# Patient Record
Sex: FEMALE | Race: WHITE | NOT HISPANIC OR LATINO | Employment: FULL TIME | URBAN - METROPOLITAN AREA
[De-identification: names, ages, dates, MRNs, and addresses within clinical notes are randomized per-mention and may not be internally consistent; named-entity substitution may affect disease eponyms.]

---

## 2017-04-12 ENCOUNTER — LAB VISIT (OUTPATIENT)
Dept: LAB | Facility: HOSPITAL | Age: 32
End: 2017-04-12
Attending: FAMILY MEDICINE
Payer: MEDICAID

## 2017-04-12 ENCOUNTER — OFFICE VISIT (OUTPATIENT)
Dept: FAMILY MEDICINE | Facility: HOSPITAL | Age: 32
End: 2017-04-12
Attending: FAMILY MEDICINE
Payer: MEDICAID

## 2017-04-12 VITALS
HEIGHT: 67 IN | SYSTOLIC BLOOD PRESSURE: 109 MMHG | HEART RATE: 78 BPM | DIASTOLIC BLOOD PRESSURE: 64 MMHG | BODY MASS INDEX: 19.07 KG/M2 | WEIGHT: 121.5 LBS

## 2017-04-12 DIAGNOSIS — R60.0 BILATERAL EDEMA OF LOWER EXTREMITY: Primary | ICD-10-CM

## 2017-04-12 DIAGNOSIS — R60.0 BILATERAL EDEMA OF LOWER EXTREMITY: ICD-10-CM

## 2017-04-12 DIAGNOSIS — M54.50 CHRONIC MIDLINE LOW BACK PAIN WITHOUT SCIATICA: ICD-10-CM

## 2017-04-12 DIAGNOSIS — N91.2 AMENORRHEA: ICD-10-CM

## 2017-04-12 DIAGNOSIS — G89.29 CHRONIC MIDLINE LOW BACK PAIN WITHOUT SCIATICA: ICD-10-CM

## 2017-04-12 LAB
ALBUMIN SERPL BCP-MCNC: 3.6 G/DL
ALP SERPL-CCNC: 77 U/L
ALT SERPL W/O P-5'-P-CCNC: 16 U/L
ANION GAP SERPL CALC-SCNC: 6 MMOL/L
AST SERPL-CCNC: 20 U/L
BASOPHILS # BLD AUTO: 0.05 K/UL
BASOPHILS NFR BLD: 0.7 %
BILIRUB SERPL-MCNC: 0.4 MG/DL
BUN SERPL-MCNC: 8 MG/DL
CALCIUM SERPL-MCNC: 9 MG/DL
CHLORIDE SERPL-SCNC: 102 MMOL/L
CO2 SERPL-SCNC: 34 MMOL/L
CREAT SERPL-MCNC: 0.7 MG/DL
DIFFERENTIAL METHOD: NORMAL
EOSINOPHIL # BLD AUTO: 0.3 K/UL
EOSINOPHIL NFR BLD: 4.5 %
ERYTHROCYTE [DISTWIDTH] IN BLOOD BY AUTOMATED COUNT: 12.7 %
EST. GFR  (AFRICAN AMERICAN): >60 ML/MIN/1.73 M^2
EST. GFR  (NON AFRICAN AMERICAN): >60 ML/MIN/1.73 M^2
GLUCOSE SERPL-MCNC: 84 MG/DL
HCG INTACT+B SERPL-ACNC: <1.2 MIU/ML
HCT VFR BLD AUTO: 38.1 %
HGB BLD-MCNC: 12.8 G/DL
LYMPHOCYTES # BLD AUTO: 2.5 K/UL
LYMPHOCYTES NFR BLD: 36.9 %
MCH RBC QN AUTO: 30.9 PG
MCHC RBC AUTO-ENTMCNC: 33.6 %
MCV RBC AUTO: 92 FL
MONOCYTES # BLD AUTO: 0.8 K/UL
MONOCYTES NFR BLD: 11.1 %
NEUTROPHILS # BLD AUTO: 3.2 K/UL
NEUTROPHILS NFR BLD: 46.7 %
PLATELET # BLD AUTO: 206 K/UL
PMV BLD AUTO: 10.1 FL
POTASSIUM SERPL-SCNC: 4 MMOL/L
PROT SERPL-MCNC: 6.2 G/DL
RBC # BLD AUTO: 4.14 M/UL
SODIUM SERPL-SCNC: 142 MMOL/L
T4 FREE SERPL-MCNC: 0.95 NG/DL
TSH SERPL DL<=0.005 MIU/L-ACNC: 1.35 UIU/ML
WBC # BLD AUTO: 6.85 K/UL

## 2017-04-12 PROCEDURE — 84702 CHORIONIC GONADOTROPIN TEST: CPT

## 2017-04-12 PROCEDURE — 84439 ASSAY OF FREE THYROXINE: CPT

## 2017-04-12 PROCEDURE — 85025 COMPLETE CBC W/AUTO DIFF WBC: CPT

## 2017-04-12 PROCEDURE — 80053 COMPREHEN METABOLIC PANEL: CPT

## 2017-04-12 PROCEDURE — 99213 OFFICE O/P EST LOW 20 MIN: CPT | Performed by: FAMILY MEDICINE

## 2017-04-12 PROCEDURE — 36415 COLL VENOUS BLD VENIPUNCTURE: CPT

## 2017-04-12 PROCEDURE — 84443 ASSAY THYROID STIM HORMONE: CPT

## 2017-04-12 RX ORDER — ACETAMINOPHEN 500 MG
500 TABLET ORAL EVERY 6 HOURS PRN
Refills: 0 | COMMUNITY
Start: 2017-04-12 | End: 2020-08-10

## 2017-04-12 NOTE — MR AVS SNAPSHOT
[facility]  [address], [address]  [place] [address]  Phone: [phone]  Fax: [phone]                  Agnes Alonso   2017 3:00 PM   Office Visit    Description:  Female : 1985   Provider:  Carole Dee MD   Department:  [facility]           Reason for Visit     Foot Swelling           Diagnoses this Visit        Comments    Bilateral edema of lower extremity    -  Primary     Chronic midline low back pain without sciatica                To Do List           Future Appointments        Provider Department Dept Phone    2017 4:40 PM APPOINTMENT LAB ABIMAELRODRIGO ZAMAN [facility] [phone]    2017 4:50 PM APPOINTMENT LAB ABIMAELRODRIGO ZAMAN [facility] [phone]    2017 4:20 PM Harvey Read MD [facility] [phone]      Goals (5 Years of Data)     None      [facility]     [facility] Nurse Care Line -  Assistance  Unless otherwise directed by your provider, please contact [facility], our nurse care line that is available for  assistance.     Registered nurses in the [facility] provide: appointment scheduling, clinical advisement, health education, and other advisory services.  Call: [phone] (toll free)               Medications           Message regarding Medications     Verify the changes and/or additions to your medication regime listed below are the same as discussed with your clinician today.  If any of these changes or additions are incorrect, please notify your healthcare provider.        STOP taking these medications     ibuprofen (ADVIL,MOTRIN) 800 MG tablet Take 1 tablet (800 mg total) by mouth daily as needed for Pain.           Verify that the below list of medications is an accurate representation of the medications you are currently taking.  If none reported, the list may be blank. If incorrect, please contact  "your healthcare provider. Carry this list with you in case of emergency.           Current Medications            Clinical Reference Information           Your Vitals Were     BP Pulse Height Weight Last Period BMI    109/64 78 5' 7" (1.702 m) 55.1 kg (121 lb 7.6 oz) 01/12/2017 19.03 kg/m2      Blood Pressure          Most Recent Value    BP  109/64      Allergies as of 4/12/2017     Penicillins      Immunizations Administered on Date of Encounter - 4/12/2017     None      Orders Placed During Today's Visit     Future Labs/Procedures Expected by Expires    CBC auto differential  4/12/2017 6/11/2018    Comprehensive metabolic panel  4/12/2017 6/11/2018    HCG, QUANTITATIVE, PREGNANCY  4/12/2017 6/11/2018    Microalbumin/creatinine urine ratio  4/12/2017 6/11/2018    T4, free  4/12/2017 6/11/2018    TSH  4/12/2017 6/11/2018    Urinalysis  4/12/2017 6/11/2018      Smoking Cessation     If you would like to quit smoking:   You may be eligible for free services if you are a [place] resident and started smoking cigarettes before September 1, 1988.  Call the Smoking Cessation Trust ([facility]) toll free at (132) 417-2076 or (560) 726-8120.   Call -800-QUIT-NOW if you do not meet the above criteria.   Contact us via email: [email or web address]   View our website for more information: [email or web address]        Language Assistance Services     ATTENTION: Language assistance services are available, free of charge. Please call [phone].      ATENCIÓN: Si habla español, tiene a silva disposición servicios gratuitos de asistencia lingüística. Llame al [phone].     CHÚ Ý: N?u b?n nói Ti?ng Vi?t, có các d?ch v? h? tr? ngôn ng? mi?n phí dành cho b?n. G?i s? [phone].         [facility] complies with applicable Federal civil rights laws and does not discriminate on the basis of race, color, national origin, age, disability, or sex.        "

## 2017-04-12 NOTE — PROGRESS NOTES
"Subjective:       Patient ID: Agnes Alonso is a 31 y.o. female.    Chief Complaint: Foot Swelling (also hands)    Edema   This is a new problem. Episode onset: 1-2 weeks bilateral LE, ~1 week hand swelling. The problem occurs constantly. The problem has been waxing and waning. Associated symptoms include fatigue, headaches (chronic) and weakness (fatigue-like). Pertinent negatives include no abdominal pain, anorexia, change in bowel habit, chest pain, chills, congestion, coughing, diaphoresis, fever, joint swelling, myalgias, nausea, neck pain, rash, sore throat, urinary symptoms, vertigo, visual change or vomiting. The symptoms are aggravated by standing. She has tried rest (Elevating feet, various "alternative" therapies consisting mostly of water and lemon juice.) for the symptoms. The treatment provided no relief.     Review of Systems   Constitutional: Positive for fatigue. Negative for appetite change, chills, diaphoresis and fever. Unexpected weight change: No ascencion weight change noticed, but increase of 5 lbs when checked today.   HENT: Negative for congestion, facial swelling, postnasal drip, rhinorrhea, sore throat and trouble swallowing.    Eyes: Negative for pain and visual disturbance.   Respiratory: Negative for cough, chest tightness and shortness of breath.    Cardiovascular: Positive for leg swelling (equal bilaterally from hip onward). Negative for chest pain and palpitations.   Gastrointestinal: Negative for abdominal distention, abdominal pain, anorexia, blood in stool, change in bowel habit, constipation, diarrhea, nausea and vomiting.   Endocrine: Negative for cold intolerance, heat intolerance, polydipsia, polyphagia and polyuria.   Genitourinary: Positive for menstrual problem (amenorrhea x4 months). Negative for decreased urine volume, dysuria, flank pain, frequency, genital sores, hematuria, pelvic pain, vaginal bleeding, vaginal discharge and vaginal pain.   Musculoskeletal: " Positive for back pain (chronic lower back pain, no significant recent change). Negative for joint swelling, myalgias and neck pain.   Skin: Negative for pallor and rash.   Allergic/Immunologic: Negative for environmental allergies, food allergies and immunocompromised state.   Neurological: Positive for weakness (fatigue-like) and headaches (chronic). Negative for dizziness, vertigo, syncope and light-headedness.       Objective:      Vitals:    04/12/17 1515   BP: 109/64   Pulse: 78     Physical Exam   Constitutional: She is oriented to person, place, and time. Vital signs are normal. She appears well-developed. She is cooperative. She does not appear ill. No distress.   HENT:   Head: Normocephalic and atraumatic.   Nose: Nose normal.   Mouth/Throat: Oropharynx is clear and moist.   Eyes: Conjunctivae and EOM are normal. Pupils are equal, round, and reactive to light. No scleral icterus.   Neck: Normal range of motion. Neck supple. No JVD present. No thyromegaly present.   Cardiovascular: Normal rate, regular rhythm, normal heart sounds and intact distal pulses.  Exam reveals no gallop and no friction rub.    No murmur heard.  Pulmonary/Chest: Effort normal and breath sounds normal. No respiratory distress. She has no wheezes. She has no rales.   Abdominal: Soft. Bowel sounds are normal. She exhibits no distension. There is no tenderness.   Musculoskeletal: Normal range of motion. She exhibits edema. She exhibits no tenderness or deformity.   Bilateral pretibial non-pitting +1 edema. Non-wheeping, non-erythematous, non-tender.   Lymphadenopathy:     She has no cervical adenopathy.   Neurological: She is alert and oriented to person, place, and time. No cranial nerve deficit. Coordination normal.   Skin: Skin is dry. No rash noted. She is not diaphoretic. No erythema.   Psychiatric: She has a normal mood and affect. Her behavior is normal. Judgment and thought content normal.   Normal apparent level of energy.    Vitals reviewed.      Assessment:     Bilateral edema of lower extremity:  - BLE are noticeably edematous, but there is no inflammation/infection nor pitting or shiny appearance. Given her chronic NSAID, her edema is most likely due to renal damage 2/2 NSAID use. Possibly taking more equivalent naproxen than her previous ibuprofen (naproxen 500mg 1-2x per day vs ibuprofen 800mg 2x per day). She denies decreased oral fluid intake, though this generally difficult to  from memory. She also denies any toxin ingestion including excessive ETOH. No family history of renal dz. Too young to have significant suspicion for CHF and her hx/physical was neg for anything consistent with it. All of this said, we cannot say with any certainty what the cause is without labs, thus she have have drawn today the labs listed below in the plan.  - Instructed pt to stop all NSAIDs and use Tylenol 500mg q6h PRN for now    Chronic midline low back pain without sciatica:   - Pain is stable and unchanged; no new complaints or progression of symptoms.   - Instructed to stop NSAIDs and start Tylenol as stated above. Pt is agreeable to trying this.    Amenorrhea:  - Has not mentrated or had any spotting for 4 months. Has a two year old child; uncomplicated pregnancy and birth. BMI is 19, technically anorexic and may explain her amenorrhea. Wt is 121lbs today, which is actually higher than her usual 110-115lbs which she has stayed between for years. Denies any sexual partners for longer than this time period, however a serum pregnancy test will be done with her workup at a precaution. If negative, we will perform a full pelvic exam and decide the next step from there.    Plan:       Bilateral edema of lower extremity  -     CBC auto differential; Future; Expected date: 4/12/17  -     Comprehensive metabolic panel; Future; Expected date: 4/12/17  -     TSH; Future; Expected date: 4/12/17  -     T4, free; Future; Expected date: 4/12/17  -      Urinalysis; Future; Expected date: 4/12/17  -     Microalbumin/creatinine urine ratio; Future; Expected date: 4/12/17  -     HCG, QUANTITATIVE, PREGNANCY; Future; Expected date: 4/12/17    Chronic midline low back pain without sciatica  -     acetaminophen (TYLENOL) 500 MG tablet; Take 1 tablet (500 mg total) by mouth every 6 (six) hours as needed for Pain.; Refill: 0    Amenorrhea:        -     Serum quantitative pregnancy test    RTC in 1 week or sooner if labs return with problems requiring immediate attention.

## 2017-04-13 NOTE — PATIENT INSTRUCTIONS
Amenorrhea     Normally, the uterine lining builds up and is shed each month during a womans period. With amenorrhea, this process does not happen.   Menstruation occurs when a woman sheds the lining of her uterus (womb). It is also called a period. For most women, this happens once a month. But some women may not get their periods. This is called amenorrhea.  Amenorrhea may be due to a number of causes. These include:  · Pregnancy, breastfeeding, or menopause  · Hormone problems  · Emotional stress  · Being at too low body weight  · Exercising too much  · Poor nutrition or eating disorders  · Taking certain medicines  · Having certain birth defects or genetic disorders  There are 2 types of amenorrhea:  · Primary amenorrhea is when a girl has not had her first period by age 15.  · Secondary amenorrhea is when:  ¨ A girl or woman who has been having normal periods stops getting them for 3 months in a row  ¨ A girl or woman who has been having irregular periods stops getting them for 6 months in a row  One or more tests can be done to find out why youre not having periods. These include blood tests and imaging tests. Once the cause is found, it can be treated. Treatments can range from lifestyle and diet changes to medicines, procedures, or surgery. Your healthcare provider will discuss options with you as needed.  Follow-up care  Follow up with your healthcare provider as advised. You'll be told the results of any tests as soon as they are ready.   Note: Know that you can still become pregnant even if you are not having regular periods. It is important to use some form of birth control if you are sexually active and do not wish to become pregnant.   When to seek medical advice  Call your healthcare provider right way if any of these occur:  · Severe pain in the abdomen (belly)  · Swelling of the belly  · Sudden, severe vaginal bleeding  · Feeling faint or passing out  Date Last Reviewed: 6/11/2015  © 2941-4933  [email or web address]. [address] [place] [address], [place] [address]. All rights reserved. This information is not intended as a substitute for professional medical care. Always follow your healthcare professional's instructions.

## 2017-04-13 NOTE — PROGRESS NOTES
I assume primary medical responsibility for this patient, I have reviewed the case history, findings, diagnosis and treatment plan with the resident and agree that the care is reasonable and necessary. This service has been performed by a resident without the presence of a teaching physician under the primary care exception  Carole Dee  4/13/2017

## 2017-04-19 ENCOUNTER — OFFICE VISIT (OUTPATIENT)
Dept: FAMILY MEDICINE | Facility: HOSPITAL | Age: 32
End: 2017-04-19
Attending: FAMILY MEDICINE
Payer: MEDICAID

## 2017-04-19 VITALS
HEIGHT: 67 IN | SYSTOLIC BLOOD PRESSURE: 126 MMHG | TEMPERATURE: 98 F | HEART RATE: 110 BPM | BODY MASS INDEX: 18.72 KG/M2 | DIASTOLIC BLOOD PRESSURE: 72 MMHG | WEIGHT: 119.25 LBS

## 2017-04-19 DIAGNOSIS — J06.9 VIRAL URI WITH COUGH: ICD-10-CM

## 2017-04-19 DIAGNOSIS — R60.0 BILATERAL EDEMA OF LOWER EXTREMITY: Primary | ICD-10-CM

## 2017-04-19 PROCEDURE — 99213 OFFICE O/P EST LOW 20 MIN: CPT | Performed by: FAMILY MEDICINE

## 2017-04-19 NOTE — PROGRESS NOTES
Subjective:       Patient ID: Agnes Alonso is a 31 y.o. female.    Chief Complaint: Results    HPI   Ms. Alonso is a 32 yo female with PMHx of Depression and Anxiety presenting for lab results. Patient states that she was seen on 4/12/17 due to complains of bilateral ankle swelling. She states that her job requires her to stand most of the time but states that the swelling started 3 weeks ago. Patient denies any trauma, injury or discoloration of her lower extremities. Patient was previously taking NSAIDs for pain but has not taken them for the past week due to risk of kidney disease. She denies any F/C, HA, SOB, CP or pain of lower extremities.    Review of Systems   Constitutional: Negative for chills and fever.   HENT: Positive for congestion, rhinorrhea and sore throat.    Eyes: Negative for visual disturbance.   Respiratory: Negative for cough, shortness of breath and wheezing.    Cardiovascular: Negative for chest pain.   Gastrointestinal: Negative for abdominal pain, constipation, diarrhea, nausea and vomiting.   Genitourinary: Negative for difficulty urinating.   Musculoskeletal: Negative for arthralgias and back pain.        Lower extremity swelling bilaterally   Neurological: Negative for dizziness, light-headedness and headaches.       Objective:      Vitals:    04/19/17 1638   BP: 126/72   Pulse: 110   Temp: 97.5 °F (36.4 °C)     Physical Exam   Constitutional: She is oriented to person, place, and time. She appears well-developed and well-nourished. No distress.   HENT:   Head: Normocephalic and atraumatic.   Eyes: No scleral icterus.   Neck: Normal range of motion.   Cardiovascular: Regular rhythm and normal heart sounds.  Tachycardia present.    No murmur heard.  Pulmonary/Chest: Effort normal and breath sounds normal. No respiratory distress. She has no wheezes. She has no rales.   Abdominal: Soft. Bowel sounds are normal. She exhibits no distension. There is no tenderness.    Musculoskeletal: Normal range of motion. She exhibits no edema or tenderness.   Lymphadenopathy:     She has cervical adenopathy.   Neurological: She is alert and oriented to person, place, and time.   Skin: She is not diaphoretic.       Assessment:       1. Bilateral edema of lower extremity    2. Viral URI with cough        Plan:       Bilateral edema of lower extremity  - Lab results reviewed with patient  - CBC, CMP, UA, TSH, lipid panel and urine microalbumin/creatnine ratio WNL  - Advised patient to wear compression stockings during periods of prolonged standing and elevate legs above the level of the heart    Viral URI with cough  - Advised patient to use OTC nasal saline spray and lozenges for symptomatic management  - Advised patient to continue monitoring her symptoms and RTC if symptoms persist beyond 1-2 weeks or worsen     Return if symptoms worsen or fail to improve.

## 2017-04-20 NOTE — PROGRESS NOTES
I assume primary medical responsibility for this patient, I have reviewed the case history, findings, diagnosis and treatment plan with the resident and agree that the care is reasonable and necessary. This service has been performed by a resident without the presence of a teaching physician under the primary care exception  Carole Dee  4/20/2017

## 2017-07-17 ENCOUNTER — OFFICE VISIT (OUTPATIENT)
Dept: URGENT CARE | Facility: CLINIC | Age: 32
End: 2017-07-17
Payer: MEDICAID

## 2017-07-17 VITALS
HEIGHT: 67 IN | HEART RATE: 100 BPM | WEIGHT: 115 LBS | BODY MASS INDEX: 18.05 KG/M2 | TEMPERATURE: 99 F | DIASTOLIC BLOOD PRESSURE: 84 MMHG | SYSTOLIC BLOOD PRESSURE: 110 MMHG

## 2017-07-17 DIAGNOSIS — L03.115 CELLULITIS OF RIGHT ANKLE: ICD-10-CM

## 2017-07-17 DIAGNOSIS — A60.9 HSV (HERPES SIMPLEX VIRUS) ANOGENITAL INFECTION: Primary | ICD-10-CM

## 2017-07-17 PROCEDURE — 99214 OFFICE O/P EST MOD 30 MIN: CPT | Mod: S$GLB,,, | Performed by: FAMILY MEDICINE

## 2017-07-17 RX ORDER — SULFAMETHOXAZOLE AND TRIMETHOPRIM 800; 160 MG/1; MG/1
1 TABLET ORAL 2 TIMES DAILY
Qty: 20 TABLET | Refills: 0 | Status: SHIPPED | OUTPATIENT
Start: 2017-07-17 | End: 2017-07-27

## 2017-07-17 RX ORDER — VALACYCLOVIR HYDROCHLORIDE 500 MG/1
500 TABLET, FILM COATED ORAL 2 TIMES DAILY
Qty: 10 TABLET | Refills: 1 | Status: SHIPPED | OUTPATIENT
Start: 2017-07-17 | End: 2022-08-18

## 2017-07-17 RX ORDER — MUPIROCIN 20 MG/G
OINTMENT TOPICAL
Qty: 22 G | Refills: 1 | Status: SHIPPED | OUTPATIENT
Start: 2017-07-17 | End: 2020-08-10

## 2017-07-18 NOTE — PROGRESS NOTES
Subjective:       Patient ID: Agnes Alonso is a 32 y.o. female.    Chief Complaint: Genital Warts and Recurrent Skin Infections    Patient initially said genital warts but actually when discussed on exam,  She meant HSV  Ion addition she has a wound right ankle that has gotten infected.  She was moving and scraped her right ankle a few days ago.         Genital Warts   This is a recurrent problem. The current episode started today. The problem has been gradually worsening. Associated symptoms include a rash. Pertinent negatives include no abdominal pain, chest pain, chills, fever, headaches, nausea, sore throat or vomiting. The symptoms are aggravated by walking. She has tried acetaminophen for the symptoms.     Review of Systems   Constitution: Negative for chills and fever.   HENT: Negative for headaches and sore throat.    Eyes: Negative for blurred vision.   Cardiovascular: Negative for chest pain.   Respiratory: Negative for shortness of breath.    Skin: Positive for itching and rash.   Musculoskeletal: Negative for back pain and joint pain.   Gastrointestinal: Negative for abdominal pain, diarrhea, nausea and vomiting.   Genitourinary: Positive for genital sores.   Psychiatric/Behavioral: The patient is not nervous/anxious.        Objective:      Physical Exam   Constitutional: She is oriented to person, place, and time. She appears well-developed and well-nourished.   HENT:   Head: Normocephalic and atraumatic. Head is without abrasion, without contusion and without laceration.   Nose: Nose normal.   Mouth/Throat: Oropharynx is clear and moist.   Eyes: Conjunctivae, EOM and lids are normal. Pupils are equal, round, and reactive to light.   Neck: Trachea normal, full passive range of motion without pain and phonation normal. Neck supple.   Cardiovascular: Normal rate, regular rhythm and normal heart sounds.    Pulmonary/Chest: Effort normal and breath sounds normal. No stridor. No respiratory  distress.   Musculoskeletal: Normal range of motion.   Neurological: She is alert and oriented to person, place, and time.   Skin: Skin is warm and dry. Capillary refill takes less than 2 seconds. Abrasion noted. No bruising, no burn, no ecchymosis, no laceration, no lesion and no rash noted. There is erythema.        Psychiatric: She has a normal mood and affect. Her speech is normal and behavior is normal. Judgment and thought content normal. Cognition and memory are normal.   Nursing note and vitals reviewed.      Assessment:       1. HSV (herpes simplex virus) anogenital infection    2. Cellulitis of right ankle        Plan:         HSV (herpes simplex virus) anogenital infection  -     valacyclovir (VALTREX) 500 MG tablet; Take 1 tablet (500 mg total) by mouth 2 (two) times daily.  Dispense: 10 tablet; Refill: 1    Cellulitis of right ankle  -     sulfamethoxazole-trimethoprim 800-160mg (BACTRIM DS) 800-160 mg Tab; Take 1 tablet by mouth 2 (two) times daily.  Dispense: 20 tablet; Refill: 0  -     mupirocin (BACTROBAN) 2 % ointment; Apply to affected area 3 times daily  Dispense: 22 g; Refill: 1      Agnes was seen today for genital warts and recurrent skin infections.    Diagnoses and all orders for this visit:    HSV (herpes simplex virus) anogenital infection  -     valacyclovir (VALTREX) 500 MG tablet; Take 1 tablet (500 mg total) by mouth 2 (two) times daily.    Cellulitis of right ankle  -     sulfamethoxazole-trimethoprim 800-160mg (BACTRIM DS) 800-160 mg Tab; Take 1 tablet by mouth 2 (two) times daily.  -     mupirocin (BACTROBAN) 2 % ointment; Apply to affected area 3 times daily            Follow Up Comments   Make sure that you follow up with your primary care doctor in the next 2-5 days if needed .  Return to the Urgent Care if signs or symptoms change and certainly if you have worsening symptoms go to the nearest emergency department for further evaluation.     Kisha Noble MD

## 2017-07-18 NOTE — PATIENT INSTRUCTIONS
Discharge Instructions for Cellulitis  You have been diagnosed with cellulitis. This is an infection in the deepest layer of the skin. In some cases, the infection also affects the muscle. Cellulitis is caused by bacteria. The bacteria can enter the body through broken skin. This can happen with a cut, scratch, animal bite, or an insect bite that has been scratched. You may have been treated in the hospital with antibiotics and fluids. You will likely be given a prescription for antibiotics to take at home. This sheet will help you take care of yourself at home.  Home care  When you are home:  · Take the prescribed antibiotic medicine you are given as directed until it is gone. Take it even if you feel better. It treats the infection and stops it from returning. Not taking all the medicine can make future infections hard to treat.  · Keep the infected area clean.  · When possible, raise the infected area above the level of your heart. This helps keep swelling down.  · Talk with your healthcare provider if you are in pain. Ask what kind of over-the-counter medicine you can take for pain.  · Apply clean bandages as advised.  · Take your temperature once a day for a week.  · Wash your hands often to prevent spreading the infection.  In the future, wash your hands before and after you touch cuts, scratches, or bandages. This will help prevent infection.   When to call your healthcare provider  Call your healthcare provider immediately if you have any of the following:  · Difficulty or pain when moving the joints above or below the infected area  · Discharge or pus draining from the area  · Fever of 100.4°F (38°C) or higher, or as directed by your healthcare provider  · Pain that gets worse in or around the infected   · Redness that gets worse in or around the infected area, particularly if the area of redness expands to a wider area  · Shaking chills  · Swelling of the infected area  · Vomiting   Date Last Reviewed:  8/1/2016 © 2000-2016 [employer]. [address] [place] [address], [place] [address]. All rights reserved. This information is not intended as a substitute for professional medical care. Always follow your healthcare professional's instructions.        Genital Herpes    Genital herpes is a common sexually transmitted disease (STD). It is caused by the herpes simplex virus (HSV). One out of five teens and adults carry the herpes virus. During an outbreak, it causes small blisters that break open, leaving small, painful sores in the genital area. Eventually, scabs form and the sores heal. In women, these show up most often on the skin just outside the vaginal opening. They can occur on the buttocks, anus, or cervix. In men, the sores are usually on the tip, sides, or base of the penis. They also occur on the scrotum, buttocks, or thighs.  The first outbreak begins within 2 to 3 weeks after exposure to an infected sexual partner. It may last 1 to 3 weeks. It may cause headache, muscle ache, and fevers. The first outbreak is usually the worst. Because the virus remains in the body even after the sores heal, most people will have more outbreaks. The frequency of outbreaks is different for each person. Some people will never have another outbreak. Others will have several episodes a year. Later outbreaks are usually shorter, milder, and less painful. For many, the number of outbreaks tends to decrease over time. Various factors may trigger an outbreak. These include:  · Emotional stress  · Menstruation  · Presence of another illness (cold, flu, or fever from any cause)  · Overexertion and fatigue  · Weakened immune system  Home care  · It is very important that you do not have sexual relations until all the herpes sores have healed completely.  · Wash the affected area gently with mild soap and water. Wash your hands after touching the affected area.  · You may use over-the-counter pain medicine unless another pain  medicine was prescribed. (Note: If you have chronic liver or kidney disease or have ever had a stomach ulcer or gastrointestinal bleeding, talk with your healthcare provider before using these medicines. Also talk to your provider if you are taking medicine to prevent blood clots.) Aspirin should never be given to anyone younger than 18 years of age who is ill with a viral infection or fever. It may cause severe liver or brain damage.  · Your healthcare provider may prescribe antiviral medicine during the first outbreak. This will help the sores heal faster. Antiviral medicine may also be prescribed so that you have it ready to take at the first sign of another outbreak. This will help the symptoms go away sooner. For people with frequent outbreaks, daily therapy may be prescribed. This will help reduce the frequency of attacks. Daily therapy may also reduce risk of spread of herpes to your sexual partner. Discuss the risks and benefits of daily therapy with your healthcare provider.  · If you are a woman who is pregnant now or may become pregnant in the future, let your healthcare provider know that you have had herpes. This may affect the way your baby is delivered.  Preventing spread to others  The virus is spread by sexual contact with someone who has the herpes virus. The risk of spread is highest when the sores are present. However, there is a chance of spreading the virus even when sores are not visible. Inform future sexual partners that you have herpes and that they may become infected. To reduce the risk of passing the virus to a partner who has never had herpes, avoid sexual relations at the first sign of an outbreak and until the sores are fully healed. Latex barriers, such as condoms, reduce the risk of spread between outbreaks if the infected site is covered, but they do not guarantee protection.  Follow-up care  Follow up with your healthcare provider, or as advised.  People who have just learned that  they have herpes may feel upset. Getting the facts about herpes can help you feel more in control. Follow up with your healthcare provider or the public health department for complete STD screening, including HIV testing. For more information about herpes, visit the Centers for Disease Control (CDC) Genital Herpes website at: www.cdc.gov/std/Herpes/default.htm.  When to seek medical advice  Call your healthcare provider right away if any of these occur:  · Inability to urinate due to pain  · Swelling or increasing redness in the genital area  · Unusual drowsiness, weakness, or confusion  · Headache or stiff neck  · Discharge from the vagina or penis  · Increasing back or abdominal pain  · Rash or joint pain  Date Last Reviewed: 9/25/2015 © 2000-2016 [employer]. [address] [place] [address], [place] [address]. All rights reserved. This information is not intended as a substitute for professional medical care. Always follow your healthcare professional's instructions.      Agnes was seen today for genital warts and recurrent skin infections.    Diagnoses and all orders for this visit:    HSV (herpes simplex virus) anogenital infection  -     valacyclovir (VALTREX) 500 MG tablet; Take 1 tablet (500 mg total) by mouth 2 (two) times daily.    Cellulitis of right ankle  -     sulfamethoxazole-trimethoprim 800-160mg (BACTRIM DS) 800-160 mg Tab; Take 1 tablet by mouth 2 (two) times daily.  -     mupirocin (BACTROBAN) 2 % ointment; Apply to affected area 3 times daily            Follow Up Comments   Make sure that you follow up with your primary care doctor in the next 2-5 days if needed .  Return to the Urgent Care if signs or symptoms change and certainly if you have worsening symptoms go to the nearest emergency department for further evaluation.     Kisha Noble MD

## 2017-07-25 ENCOUNTER — OFFICE VISIT (OUTPATIENT)
Dept: URGENT CARE | Facility: CLINIC | Age: 32
End: 2017-07-25
Payer: MEDICAID

## 2017-07-25 VITALS
BODY MASS INDEX: 18.05 KG/M2 | DIASTOLIC BLOOD PRESSURE: 78 MMHG | HEIGHT: 67 IN | HEART RATE: 108 BPM | SYSTOLIC BLOOD PRESSURE: 112 MMHG | RESPIRATION RATE: 18 BRPM | OXYGEN SATURATION: 100 % | TEMPERATURE: 98 F | WEIGHT: 115 LBS

## 2017-07-25 DIAGNOSIS — K52.9 GASTROENTERITIS: Primary | ICD-10-CM

## 2017-07-25 PROCEDURE — S0119 ONDANSETRON 4 MG: HCPCS | Mod: S$GLB,,, | Performed by: FAMILY MEDICINE

## 2017-07-25 PROCEDURE — 99214 OFFICE O/P EST MOD 30 MIN: CPT | Mod: S$GLB,,, | Performed by: FAMILY MEDICINE

## 2017-07-25 RX ORDER — ONDANSETRON 4 MG/1
4 TABLET, ORALLY DISINTEGRATING ORAL EVERY 8 HOURS PRN
Qty: 9 TABLET | Refills: 0 | Status: SHIPPED | OUTPATIENT
Start: 2017-07-25 | End: 2017-07-28

## 2017-07-25 RX ORDER — ONDANSETRON 4 MG/1
4 TABLET, ORALLY DISINTEGRATING ORAL
Status: COMPLETED | OUTPATIENT
Start: 2017-07-25 | End: 2017-07-25

## 2017-07-25 RX ORDER — HYOSCYAMINE SULFATE 0.12 MG/1
0.12 TABLET SUBLINGUAL EVERY 6 HOURS PRN
Qty: 12 TABLET | Refills: 0 | Status: SHIPPED | OUTPATIENT
Start: 2017-07-25 | End: 2020-08-10

## 2017-07-25 RX ADMIN — ONDANSETRON 4 MG: 4 TABLET, ORALLY DISINTEGRATING ORAL at 08:07

## 2017-07-25 NOTE — LETTER
July 25, 2017      [facility]  [place] [id number]  Phone: [phone]  Fax: [phone]       Patient: Agnes Alonso   YOB: 1985  Date of Visit: 07/25/2017    To Whom It May Concern:    Agnes Villalobos was at [facility] on 07/25/2017. She may return to work/school on 7/27/17 with no restrictions. If you have any questions or concerns, or if I can be of further assistance, please do not hesitate to contact me.    Sincerely,    Deanna Durham MA

## 2017-07-26 NOTE — PROGRESS NOTES
"Subjective:       Patient ID: Agnes Alonso is a 32 y.o. female.    Vitals:  height is 5' 7" (1.702 m) and weight is 52.2 kg (115 lb). Her temperature is 98 °F (36.7 °C). Her blood pressure is 112/78 and her pulse is 108. Her respiration is 18 and oxygen saturation is 100%.     Chief Complaint: Emesis and Diarrhea    Emesis    This is a new problem. The current episode started today. The problem occurs intermittently. The problem has been gradually worsening. There has been no fever. Associated symptoms include chills, diarrhea and headaches. Pertinent negatives include no abdominal pain, chest pain or fever. She has tried nothing for the symptoms.   Diarrhea    This is a new problem. The current episode started today. Associated symptoms include chills, headaches and vomiting. Pertinent negatives include no abdominal pain or fever.     Review of Systems   Constitution: Positive for chills. Negative for fever.   HENT: Positive for headaches.    Cardiovascular: Negative for chest pain.   Respiratory: Negative for shortness of breath.    Musculoskeletal: Negative for back pain.   Gastrointestinal: Positive for diarrhea, nausea and vomiting. Negative for abdominal pain, constipation, hematochezia and melena.   Genitourinary: Negative for dysuria.       Objective:      Physical Exam   Constitutional: She is oriented to person, place, and time. She appears well-developed and well-nourished.   HENT:   Head: Normocephalic and atraumatic.   Right Ear: External ear normal.   Left Ear: External ear normal.   Nose: Nose normal.   Mouth/Throat: Mucous membranes are normal.   Eyes: Conjunctivae and lids are normal.   Neck: Trachea normal and full passive range of motion without pain. Neck supple.   Cardiovascular: Normal rate, regular rhythm and normal heart sounds.    Pulmonary/Chest: Effort normal and breath sounds normal. No respiratory distress.   Abdominal: Soft. Normal appearance and bowel sounds are normal. She " exhibits no distension, no abdominal bruit, no pulsatile midline mass and no mass. There is no tenderness.   Nauseated. + Direct tenderness lower abdominal area.   Musculoskeletal: Normal range of motion. She exhibits no edema.   Neurological: She is alert and oriented to person, place, and time. She has normal strength.   Skin: Skin is warm, dry and intact. She is not diaphoretic. No pallor.   Psychiatric: She has a normal mood and affect. Her speech is normal and behavior is normal. Judgment and thought content normal. Cognition and memory are normal.   Nursing note and vitals reviewed.      Assessment:       1. Gastroenteritis        Plan:         Gastroenteritis  -     ondansetron disintegrating tablet 4 mg; Take 1 tablet (4 mg total) by mouth one time.  -     hyoscyamine (LEVSIN/SL) 0.125 mg Subl; Place 1 tablet (0.125 mg total) under the tongue every 6 (six) hours as needed.  Dispense: 12 tablet; Refill: 0  -     ondansetron (ZOFRAN-ODT) 4 MG TbDL; Take 1 tablet (4 mg total) by mouth every 8 (eight) hours as needed (nausea & vomiting).  Dispense: 9 tablet; Refill: 0      Follow up with PCP/Specialist if not any better as discussed. To ER of [facility] for any worsening of symptoms.  Patient/Guardian voiced understanding and agreement.      Tj Jasmine MD

## 2017-07-26 NOTE — PATIENT INSTRUCTIONS
Nonspecific Vomiting and Diarrhea (Adult)  Vomiting and diarrhea can have many causes, including:  · Helping your body get rid of harmful substances   · Gastroenteritis caused by viruses, parasites, bacteria, or toxins.  · Allergy to or side effect of a food or medicine  · Severe stress or worry (anxiety)   · Other illnesses  · Pregnancy  It is often hard to pinpoint an exact cause, even with testing. Vomiting and diarrhea often go away within a day or two without problems. If they continue, though, they can lead to too much loss of fluid (dehydration). This can be serious if not treated.    Home care  Medications  · You may use acetaminophen or NSAID medicines like ibuprofen or naproxen to control fever, unless another medicine was prescribed. If you have chronic liver or kidney disease, talk with your healthcare provider before using these medicines. Also talk with your provider if you've had a stomach ulcer or gastrointestinal bleeding. Don't give aspirin to anyone under 18 years of age who is ill with a fever. Don't use NSAID medicines if you are already taking one for another condition (like arthritis) or are on aspirin (such as for heart disease or after a stroke)  · If medicines for diarrhea or vomiting were prescribed, take these only as directed. Never take these without a healthcare providers approval.  General care  · If symptoms are severe, rest at home for the next 24 hours, or until you are feeling better.  · Washing your hands with soap and water, or using alcohol-based hand  is the best way to stop the spread of infection. Wash your hands after touching anyone who is sick.  · Wash your hands after using the toilet and before meals. Clean the toilet after each use.  · Caffeine, tobacco, and alcohol can make the diarrhea, cramping, and pain worse. Remember, caffeine not only is in coffee, but also is in chocolate, some energy drinks, and teas.  Diet  · Water and clear liquids are important  so you don't get dehydrated. Drink a small amount at a time. Don't guzzle down the drinks. That may increase your nausea, make cramping worse, and cause the drinks to come back up.  · Sports drinks may also help. Make sure they are not too sugary, because this can sometimes make things worse. Also, don't drink beverages that are too acidic, like orange juice and grape juice.  · If you are very dehydrated, sports drinks aren't a good choice. They have too much sugar and not enough electrolytes. In this case, commercially available products called oral rehydration solutions are best.  Food  · Don't force yourself to eat, especially if you have cramps, diarrhea, or vomiting. Eat just a little at a time, and then wait a few minutes before you try to eat more.  · Don't eat fatty, greasy, spicy, or fried foods.  · Don't eat dairy products if you have diarrhea. They can make it worse.  During the first 24 hours (the first full day), follow the diet below:  · Beverages: Sports drinks, soft drinks without caffeine, mineral water, and decaffeinated tea and coffee  · Soups: Clear broth, consommé, and bouillon  · Desserts: Plain gelatin, popsicles, and fruit juice bars  During the next 24 hours (the second day), you may add the following to the above if you are better. If not, continue what you did the first day:  · Hot cereal, plain toast, bread, rolls, crackers  · Plain noodles, rice, mashed potatoes, chicken noodle or rice soup  · Unsweetened canned fruit (avoid pineapple), bananas  · Limit fat intake to less than 15 grams per day by avoiding margarine, butter, oils, mayonnaise, sauces, gravies, fried foods, peanut butter, meat, poultry, and fish.  · Limit fiber. Avoid raw or cooked vegetables, fresh fruits (except bananas) and bran cereals.  · Limit caffeine and chocolate. No spices or seasonings except salt.  During the next 24 hours:  · Gradually resume a normal diet, as you feel better and your symptoms improve.  · If at  any time your symptoms start getting worse again, go back to clear liquids until you feel better.  Food preparation  · If you have diarrhea, you should not prepare food for others. When preparing foods, wash your hands before and after.  · Wash your hands or use alcohol-based  after using cutting boards, countertops, and knives that have been in contact with raw food.  · Keep uncooked meats away from cooked and ready-to-eat foods.  Follow-up care  Follow up with your healthcare advisor, or as advised. Call if you do not get better in the next 2 to 3 days. If a stool (diarrhea) sample was taken, or cultures done, you will be told if they are positive, or if your treatment needs to be changed. You may call as directed for the results.  If X-rays were taken, and a radiologist has not yet looked at them, he or she will do so. You will be told if there is a change in the reading, especially if it affects your treatment.  Call 911  Call 911 if any of these occur:  · Trouble breathing  · Chest pain  · Confusion  · Severe drowsiness or trouble awakening  · Fainting or loss of consciousness  · Rapid heart rate  · Seizure  · Stiff neck  · Severe weakness, dizziness, or lightheadedness  When to seek medical advice  Call your healthcare provider right away if any of these occur:  · Bloody or black vomit or stools.  · Severe, steady abdominal pain or any abdominal pain that is getting worse.  · Severe headache or stiff neck  · An inability to hold down even sips of liquids for more than 12 hours.  · Vomiting that lasts more than 24 hours.  · Diarrhea that lasts more than 24 hours.  · Fever of 100.4°F (38.0°C) or higher that lasts more than 48 hours, or as directed by your health care provider  · Yellowish color to your skin or the whites of your eyes.  · Signs of dehydration, such as dry mouth, little urine (less than every 6 hours), or very dark urine.  Date Last Reviewed: 1/3/2016  © 6565-5149 [employer] [address] [place] [address]. All rights reserved. This information is not intended as a substitute for professional medical care. Always follow your healthcare professional's instructions.

## 2019-02-13 DIAGNOSIS — M54.50 LUMBAGO: Primary | ICD-10-CM

## 2019-03-07 ENCOUNTER — CLINICAL SUPPORT (OUTPATIENT)
Dept: REHABILITATION | Facility: OTHER | Age: 34
End: 2019-03-07
Payer: MEDICAID

## 2019-03-07 DIAGNOSIS — M54.50 CHRONIC BILATERAL LOW BACK PAIN WITHOUT SCIATICA: ICD-10-CM

## 2019-03-07 DIAGNOSIS — G89.29 CHRONIC BILATERAL LOW BACK PAIN WITHOUT SCIATICA: ICD-10-CM

## 2019-03-07 DIAGNOSIS — M54.6 CHRONIC BILATERAL THORACIC BACK PAIN: ICD-10-CM

## 2019-03-07 DIAGNOSIS — M41.25 OTHER IDIOPATHIC SCOLIOSIS, THORACOLUMBAR REGION: ICD-10-CM

## 2019-03-07 DIAGNOSIS — G89.29 CHRONIC BILATERAL THORACIC BACK PAIN: ICD-10-CM

## 2019-03-07 PROCEDURE — 97161 PT EVAL LOW COMPLEX 20 MIN: CPT | Mod: PN | Performed by: PHYSICAL THERAPIST

## 2019-03-07 NOTE — PLAN OF CARE
[facility]  Physical Therapy Initial Evaluation    Name: Agnes Alonso  Clinic Number: [id number]    Therapy Diagnosis:   Encounter Diagnoses   Name Primary?    Chronic bilateral low back pain without sciatica     Chronic bilateral thoracic back pain     Other idiopathic scoliosis, thoracolumbar region      Physician: Evangelina Mcadams, *    Physician Orders: PT Eval and Treat   Medical Diagnosis from Referral: M54.5 (ICD-10-CM) - Lumbago   Evaluation Date: 3/7/2019  Authorization Period Expiration: 2019  Plan of Care Expiration: 2019  Visit # / Visits authorized:     Time In: 1:00 PM  Time Out: 1:40 PM  Total Billable Time: 40 minutes    Precautions: Standard    Subjective   Date of onset: severe fall at age 17. Increased pain with detox from opiate addiction. Currently in [facility] receiving treatment, 2 months sober currently  History of current condition - Agnes reports: history of thoracolumbar fractures after falling 3 stories at age 17. Had PT in 2016 with minimal relief with stretching. Currently reports pain to low back and feeling of weakness to mm along midback. Pt says she gets massages when she is able, and feels most relief with massage and stretching. Reports occasional intermittent numbness/tingling to B lateral LE to ankle (L>R), but only rarely. Denies changes to B/B fucntion or weakness to LE       Past Medical History:   Diagnosis Date    Anxiety     Depression     Herpes genitalia      Agnes Alonso  has a past surgical history that includes  section.    Agnes has a current medication list which includes the following prescription(s): acetaminophen, hyoscyamine, mupirocin, and valacyclovir.    Review of patient's allergies indicates:   Allergen Reactions    Penicillins Hives        Imaging, none recently    Prior Therapy: Has had therapy in the past with limited perceived benefit  Social History: Pt currently  living at [employer]  Occupation: working in kitchen at [employer], cooking and serving food, cleaning kitchen  Prior Level of Function: Independent with ADL's, driving  Current Level of Function: Independent with ADL's, not driving due to restrictions with drug rehab. Limits lifting.    Pain:  Current 0/10, worst 10/10, best 0/10   Location: low/mid back R>L  Description: Aching, Throbbing and Shooting  Aggravating Factors: walking, prolonged standing, lifting, prolonged bending (doing dishes)  Easing Factors: lying down and heating pad    Pts goals: decrease pain, learn techniques to help cope with pain    Objective       Posture:  Scoliotic curve noted with forward flexion, convexity L at thoracolumbar junction    Lumbar Range of Motion:    Percent WFL Pain   Flexion WNL   No pain, S-curve noted        Extension WNL   Pain B at upper lumbar spine        Left Side Bending WNL         Right Side Bending WNL         Left rotation   WNL         Right Rotation   WNL              Lower Extremity Strength  Right LE  Left LE    Ankle dorsiflexion: 5/5 Ankle dorsiflexion: 5/5   Ankle plantarflexion: 5/5 Ankle plantarflexion: 5/5   Knee extension: 5/5 Knee extension: 5/5   Knee flexion: 5/5 Knee flexion: 5/5   Hip flexion: 5/5 Hip flexion: 5/5   Hip external rotation: 5/5 Hip external rotation: 5/5   Hip internal rotation: 4+/5 Hip internal rotation: 4/5   Hip extension:  5/5 Hip extension: 5/5   Hip abduction: 4+/5 Hip abduction: 4+/5   Hip adduction: 4/5 Hip adduction 4/5           Neuro Dynamic Testing:    Sciatic nerve:      SLR: R = negative     L = negative          Joint Mobility: hypomobility noted with CPA through upper lumbar and lower thoracic spine    Palpation: no significant tenderness. Increased guarding noted through PVM along thoracolumbar junction    Sensation: grossly intact to light touch    Flexibility:    Hamstring: R = full; L = full   Quad: R = slight; L = slight   Piriformis: R: slight; L  slight   Radha's test: R = full ; L = slight          CMS Impairment/Limitation/Restriction for FOTO Lumbar Spine Survey    Therapist reviewed FOTO scores for Agnes Alonso on 3/7/2019.   FOTO documents entered into [employer] - see Media section.    Limitation Score: 31%  Category: Mobility    Current : CJ = at least 20% but < 40% impaired, limited or restricted  Goal: CJ = at least 20% but < 40% impaired, limited or restricted  Discharge: n/a         TREATMENT   Treatment Time In: n/a  Treatment Time Out: n/a  Total Treatment time separate from Evaluation: n/a minutes      Home Exercises and Patient Education Provided    Education provided:   - Therapy rationale and plan of care.     Written Home Exercises Provided: Patient instructed to cont prior HEP.  Pt demonstrates piriformis stretches from previous PT encounter. Instructed to continue these stretches, as well as child's pose from her yoga practice previously.     Assessment   Agnes is a 33 y.o. female referred to outpatient Physical Therapy with a medical diagnosis of M54.5 (ICD-10-CM) - Lumbago. Pt presents with chronic history of low and mid back pain from injury as a teenager falling from height. Overall thoracolumbar ROM is WFL. Scoliotic curve noted with L convexity at thoracolumbar junction. Mild hip weakness B. Guarding noted through PVM upper lumbar/lower thoracic spine consistent with reported feeling of weakness to mid and low back. Functionally pt reports limitations with prolonged standing, lifting, and tasks requiring prolonged flexion such as washing dishes.    Pt prognosis is Good.   Pt will benefit from skilled outpatient Physical Therapy to address the deficits stated above and in the chart below, provide pt/family education, and to maximize pt's level of independence.     Plan of care discussed with patient: Yes  Pt's spiritual, cultural and educational needs considered and patient is agreeable to the plan of care and goals as stated  below:     Anticipated Barriers for therapy: chronicity of condition    Medical Necessity is demonstrated by the following  History  Co-morbidities and personal factors that may impact the plan of care Co-morbidities:   anxiety, depression and currently receiving treatment for opiate addiction    Personal Factors:   lifestyle     low   Examination  Body Structures and Functions, activity limitations and participation restrictions that may impact the plan of care Body Regions:   back    Body Systems:    ROM  strength    Participation Restrictions:   Standing, bending, lifting, walking    Activity limitations:   Learning and applying knowledge  no deficits    General Tasks and Commands  no deficits    Communication  no deficits    Mobility  lifting and carrying objects  walking    Self care  no deficits    Domestic Life  doing house work (cleaning house, washing dishes, laundry)    Interactions/Relationships  no deficits    Life Areas  no deficits    Community and Social Life  no deficits         low   Clinical Presentation stable and uncomplicated low   Decision Making/ Complexity Score: low     Goals:  Short Term Goals (4 Weeks):   1. Pt will report 20% reduction in pain of the lumbar spine for ease with ADL's  2. PT will demonstrate improved upright posture with minimal cuing for ease with functional positioning in home and community.  3. Pt will report improved tolerance to standing >10 minutes to improve ability with chores including dishes    Long Term Goals (12 Weeks):   1. Pt will report being independent with HEP for maintenance of improvements gained during therapy sessions  2. PT will report 50% reduction of pain of the back for ease with walking.   3. Pt will demonstrate trunk and extremity strength to >=4+/5 without the provocation of pain for ease with household chores  4. Pt will demonstrate appropriate upright posture without external cueing for ease with walking.   5. Pt to demonstrate improved  functional ability with FOTO limitation <=30% disability.    Plan   Plan of care Certification: 3/7/2019 to 5/31/2019.    Outpatient Physical Therapy 2 times weekly for 12 weeks to include the following interventions: Manual Therapy, Moist Heat/ Ice, Patient Education and Therapeutic Exercise.     Agnes Dee, PT

## 2019-03-19 ENCOUNTER — CLINICAL SUPPORT (OUTPATIENT)
Dept: REHABILITATION | Facility: OTHER | Age: 34
End: 2019-03-19
Payer: MEDICAID

## 2019-03-19 DIAGNOSIS — M54.50 CHRONIC BILATERAL LOW BACK PAIN WITHOUT SCIATICA: ICD-10-CM

## 2019-03-19 DIAGNOSIS — G89.29 CHRONIC BILATERAL LOW BACK PAIN WITHOUT SCIATICA: ICD-10-CM

## 2019-03-19 DIAGNOSIS — M41.25 OTHER IDIOPATHIC SCOLIOSIS, THORACOLUMBAR REGION: ICD-10-CM

## 2019-03-19 DIAGNOSIS — G89.29 CHRONIC BILATERAL THORACIC BACK PAIN: ICD-10-CM

## 2019-03-19 DIAGNOSIS — M54.6 CHRONIC BILATERAL THORACIC BACK PAIN: ICD-10-CM

## 2019-03-19 PROCEDURE — 97110 THERAPEUTIC EXERCISES: CPT | Mod: PN | Performed by: PHYSICAL THERAPIST

## 2019-03-19 NOTE — PROGRESS NOTES
"  Physical Therapy Daily Treatment Note     Name: Agnes Alonso  Clinic Number: [id number]    Therapy Diagnosis:   Encounter Diagnoses   Name Primary?    Chronic bilateral low back pain without sciatica     Chronic bilateral thoracic back pain     Other idiopathic scoliosis, thoracolumbar region      Physician: Evangelina Mcadams, *    Visit Date: 3/19/2019    Physician Orders: PT Eval and Treat   Medical Diagnosis from Referral: M54.5 (ICD-10-CM) - Lumbago   Evaluation Date: 3/7/2019  Authorization Period Expiration: 6/25/2019  Plan of Care Expiration: 5/31/2019  Visit # / Visits authorized: 2/ 20     Time In: 8:00 AM  Time Out: 9:00 AM  Total Billable Time: 60 minutes    Precautions: Standard    Subjective     Pt reports: feeling ok this morning.  She was compliant with home exercise program.  Response to previous treatment: no change  Functional change: no change    Pain: 0/10  Location:  low/mid back R>L      Objective     Agnes received therapeutic exercises to develop strength, ROM, flexibility, posture and core stabilization for 60 minutes including:  TrA 5" x 20  TrA PPT 5" x 20  Bridge with PPT 20x  Brace marching 2 min  Dying bug 2 min  B KTC on ball x 3 min  LTR on ball x 3 min  SL clam 3 x 10  SL [place] 3 x 10  Alt LE lift x 10  Alt UE lift x 10  Alt UE/LE lift x 10      Home Exercises Provided and Patient Education Provided     Education provided:   - new therex for HEP    Written Home Exercises Provided: yes.  Exercises were reviewed and Agnes was able to demonstrate them prior to the end of the session.  Agnes demonstrated good  understanding of the education provided.     See EMR under Patient Instructions for exercises provided 3/19/2019.    Assessment     Good tolerance to initial treatment session. Verbal and visual cuing for technique with new exercises, pt returns good demonstration.  Agnes is progressing well towards her goals.   Pt prognosis is Good.     Pt will " continue to benefit from skilled outpatient physical therapy to address the deficits listed in the problem list box on initial evaluation, provide pt/family education and to maximize pt's level of independence in the home and community environment.     Pt's spiritual, cultural and educational needs considered and pt agreeable to plan of care and goals.     Anticipated barriers to physical therapy: chronicity of condition, transportation    Goals: IE 3/7/2019  Short Term Goals (4 Weeks):   1. Pt will report 20% reduction in pain of the lumbar spine for ease with ADL's. Progressing, not met  2. PT will demonstrate improved upright posture with minimal cuing for ease with functional positioning in home and community. Progressing, not met  3. Pt will report improved tolerance to standing >10 minutes to improve ability with chores including dishes. Progressing, not met     Long Term Goals (12 Weeks):   1. Pt will report being independent with HEP for maintenance of improvements gained during therapy sessions. Progressing, not met  2. PT will report 50% reduction of pain of the back for ease with walking. Progressing, not met  3. Pt will demonstrate trunk and extremity strength to >=4+/5 without the provocation of pain for ease with household chores. Progressing, not met  4. Pt will demonstrate appropriate upright posture without external cueing for ease with walking. Progressing, not met  5. Pt to demonstrate improved functional ability with FOTO limitation <=30% disability. Progressing, not met      Plan     Plan of care Certification: 3/7/2019 to 5/31/2019.     Outpatient Physical Therapy 2 times weekly for 12 weeks to include the following interventions: Manual Therapy, Moist Heat/ Ice, Patient Education and Therapeutic Exercise.     Agnes Dee, PT

## 2019-03-20 NOTE — PROGRESS NOTES
"  Physical Therapy Daily Treatment Note     Name: Agnes Alonso  Clinic Number: [id number]    Therapy Diagnosis:   Encounter Diagnoses   Name Primary?    Chronic bilateral low back pain without sciatica     Chronic bilateral thoracic back pain     Other idiopathic scoliosis, thoracolumbar region      Physician: Evangelina Mcadams, *    Visit Date: 3/21/2019    Physician Orders: PT Eval and Treat   Medical Diagnosis from Referral: M54.5 (ICD-10-CM) - Lumbago   Evaluation Date: 3/7/2019  Authorization Period Expiration: 6/25/2019  Plan of Care Expiration: 5/31/2019  Visit # / Visits authorized: 3/ 20     Time In: 8:30 AM  Time Out: 9:30 AM  Total Billable Time: 60 minutes    Precautions: Standard    Subjective     Pt reports: mild tightness to mid back this morning.  She was compliant with home exercise program.  Response to previous treatment: mild soreness to abdominal muscles  Functional change: no change    Pain: 2/10  Location:  low/mid back R>L      Objective     Agnes received therapeutic exercises to develop strength, ROM, flexibility, posture and core stabilization for 55 minutes including:  TrA 5" x 20  TrA PPT 5" x 20  Bridge with PPT 30x  Brace marching 2 min  Dying bug (leg extends senior care) 2 min  B KTC on ball x 3 min  LTR on ball x 3 min  +QL stretch  SL clam 3 x 10  SL Christos 3 x 10  Alt LE lift x 10  Alt UE lift x 10  Alt UE/LE lift x 10  Child's pose x 3 min (fwd and to each side x 1 min)    5 min x preparation and application of Kineseotape. Patient received education regarding appropriate care and removal of Kinesiotape. Patient instructed in proper removal techniques if skin irritation occurs.      Home Exercises Provided and Patient Education Provided     Education provided:   - new therex for HEP    Written Home Exercises Provided: yes.  Exercises were reviewed and Agnes was able to demonstrate them prior to the end of the session.  Agnes demonstrated good  understanding " of the education provided.     See EMR under Patient Instructions for exercises provided 3/19/2019.    Assessment     Good tolerance to therex today. Mild c/o tightness to mid back with alternate upper extremity lift in prone, relieves with child's pose stretch. Kineseotape applied to PVM to reduce guarding today. Patient received education regarding appropriate care and removal of Kinesiotape. Patient instructed in proper removal techniques if skin irritation occurs.   Agnes is progressing well towards her goals.   Pt prognosis is Good.     Pt will continue to benefit from skilled outpatient physical therapy to address the deficits listed in the problem list box on initial evaluation, provide pt/family education and to maximize pt's level of independence in the home and community environment.     Pt's spiritual, cultural and educational needs considered and pt agreeable to plan of care and goals.     Anticipated barriers to physical therapy: chronicity of condition, transportation    Goals: IE 3/7/2019  Short Term Goals (4 Weeks):   1. Pt will report 20% reduction in pain of the lumbar spine for ease with ADL's. Progressing, not met  2. PT will demonstrate improved upright posture with minimal cuing for ease with functional positioning in home and community. Progressing, not met  3. Pt will report improved tolerance to standing >10 minutes to improve ability with chores including dishes. Progressing, not met     Long Term Goals (12 Weeks):   1. Pt will report being independent with HEP for maintenance of improvements gained during therapy sessions. Progressing, not met  2. PT will report 50% reduction of pain of the back for ease with walking. Progressing, not met  3. Pt will demonstrate trunk and extremity strength to >=4+/5 without the provocation of pain for ease with household chores. Progressing, not met  4. Pt will demonstrate appropriate upright posture without external cueing for ease with walking.  Progressing, not met  5. Pt to demonstrate improved functional ability with FOTO limitation <=30% disability. Progressing, not met      Plan     Plan of care Certification: 3/7/2019 to 5/31/2019.     Outpatient Physical Therapy 2 times weekly for 12 weeks to include the following interventions: Manual Therapy, Moist Heat/ Ice, Patient Education and Therapeutic Exercise.     Agnes Dee, PT

## 2019-03-21 ENCOUNTER — CLINICAL SUPPORT (OUTPATIENT)
Dept: REHABILITATION | Facility: OTHER | Age: 34
End: 2019-03-21
Payer: MEDICAID

## 2019-03-21 DIAGNOSIS — G89.29 CHRONIC BILATERAL LOW BACK PAIN WITHOUT SCIATICA: ICD-10-CM

## 2019-03-21 DIAGNOSIS — G89.29 CHRONIC BILATERAL THORACIC BACK PAIN: ICD-10-CM

## 2019-03-21 DIAGNOSIS — M54.6 CHRONIC BILATERAL THORACIC BACK PAIN: ICD-10-CM

## 2019-03-21 DIAGNOSIS — M54.50 CHRONIC BILATERAL LOW BACK PAIN WITHOUT SCIATICA: ICD-10-CM

## 2019-03-21 DIAGNOSIS — M41.25 OTHER IDIOPATHIC SCOLIOSIS, THORACOLUMBAR REGION: ICD-10-CM

## 2019-03-21 PROCEDURE — 97110 THERAPEUTIC EXERCISES: CPT | Mod: PN | Performed by: PHYSICAL THERAPIST

## 2019-03-25 NOTE — PROGRESS NOTES
"  Physical Therapy Daily Treatment Note     Name: Agnes Alonso  Clinic Number: [id number]    Therapy Diagnosis:   Encounter Diagnoses   Name Primary?    Chronic bilateral low back pain without sciatica     Chronic bilateral thoracic back pain     Other idiopathic scoliosis, thoracolumbar region      Physician: Evangelina Mcadams, *    Visit Date: 3/26/2019    Physician Orders: PT Eval and Treat   Medical Diagnosis from Referral: M54.5 (ICD-10-CM) - Lumbago   Evaluation Date: 3/7/2019  Authorization Period Expiration: 6/25/2019  Plan of Care Expiration: 5/31/2019  Visit # / Visits authorized: 4/ 20     Time In: 8:00 AM  Time Out: 9:00 AM  Total Billable Time: 60 minutes    Precautions: Standard    Subjective     Pt reports: feeling more sore today from her chores at home.  She was compliant with home exercise program.  Response to previous treatment: mild soreness to abdominal muscles  Functional change: no change    Pain: 3/10  Location:  low/mid back R>L      Objective     Agnes received therapeutic exercises to develop strength, ROM, flexibility, posture and core stabilization for 55 minutes including:  Piriformis stretch (figure 4) 3 x 30"  TrA 5" x 20  TrA PPT 5" x 20  Bridge with PPT 30x  Brace marching 2 min  Dying bug (leg extends FDC) 2 min  B KTC on ball x 3 min  LTR on ball x 3 min  QL stretch  SL clam with OTB 3 x 10  SL Christos 3 x 10  Alt LE lift x 10  Alt UE lift x 10  Alt UE/LE lift x 10 - NP  Child's pose x 3 min (fwd and to each side x 1 min)    5 min x preparation and application of Kineseotape. I strip to PVM thoracolumbar spine, X across low back. Patient received education regarding appropriate care and removal of Kinesiotape. Patient instructed in proper removal techniques if skin irritation occurs.      Home Exercises Provided and Patient Education Provided     Education provided:   - 3/19/19 new therex for HEP    Written Home Exercises Provided: yes.  Exercises were " reviewed and Agnes was able to demonstrate them prior to the end of the session.  Agnes demonstrated good  understanding of the education provided.     See EMR under Patient Instructions for exercises provided 3/19/2019.    Assessment     Good tolerance to therex today. Orange band added to sidelying hip ER with min c/o mm fatigue but able to complete. Pt requests to hold prone alt UE/LE lift today as her back feels tight. Pt reports relief with application of Kineseotape.   Agnes is progressing well towards her goals.   Pt prognosis is Good.     Pt will continue to benefit from skilled outpatient physical therapy to address the deficits listed in the problem list box on initial evaluation, provide pt/family education and to maximize pt's level of independence in the home and community environment.     Pt's spiritual, cultural and educational needs considered and pt agreeable to plan of care and goals.     Anticipated barriers to physical therapy: chronicity of condition, transportation    Goals: IE 3/7/2019  Short Term Goals (4 Weeks):   1. Pt will report 20% reduction in pain of the lumbar spine for ease with ADL's. Progressing, not met  2. PT will demonstrate improved upright posture with minimal cuing for ease with functional positioning in home and community. Progressing, not met  3. Pt will report improved tolerance to standing >10 minutes to improve ability with chores including dishes. Progressing, not met     Long Term Goals (12 Weeks):   1. Pt will report being independent with HEP for maintenance of improvements gained during therapy sessions. Progressing, not met  2. PT will report 50% reduction of pain of the back for ease with walking. Progressing, not met  3. Pt will demonstrate trunk and extremity strength to >=4+/5 without the provocation of pain for ease with household chores. Progressing, not met  4. Pt will demonstrate appropriate upright posture without external cueing for ease with  walking. Progressing, not met  5. Pt to demonstrate improved functional ability with FOTO limitation <=30% disability. Progressing, not met      Plan     Plan of care Certification: 3/7/2019 to 5/31/2019.     Outpatient Physical Therapy 2 times weekly for 12 weeks to include the following interventions: Manual Therapy, Moist Heat/ Ice, Patient Education and Therapeutic Exercise.     Agnes Dee, PT

## 2019-03-26 ENCOUNTER — CLINICAL SUPPORT (OUTPATIENT)
Dept: REHABILITATION | Facility: OTHER | Age: 34
End: 2019-03-26
Payer: MEDICAID

## 2019-03-26 DIAGNOSIS — M54.50 CHRONIC BILATERAL LOW BACK PAIN WITHOUT SCIATICA: ICD-10-CM

## 2019-03-26 DIAGNOSIS — M41.25 OTHER IDIOPATHIC SCOLIOSIS, THORACOLUMBAR REGION: ICD-10-CM

## 2019-03-26 DIAGNOSIS — G89.29 CHRONIC BILATERAL LOW BACK PAIN WITHOUT SCIATICA: ICD-10-CM

## 2019-03-26 DIAGNOSIS — G89.29 CHRONIC BILATERAL THORACIC BACK PAIN: ICD-10-CM

## 2019-03-26 DIAGNOSIS — M54.6 CHRONIC BILATERAL THORACIC BACK PAIN: ICD-10-CM

## 2019-03-26 PROCEDURE — 97110 THERAPEUTIC EXERCISES: CPT | Mod: PN | Performed by: PHYSICAL THERAPIST

## 2019-03-27 NOTE — PROGRESS NOTES
"  Physical Therapy Daily Treatment Note     Name: Agnes Alonso  Clinic Number: [id number]    Therapy Diagnosis:   Encounter Diagnoses   Name Primary?    Chronic bilateral low back pain without sciatica     Chronic bilateral thoracic back pain     Other idiopathic scoliosis, thoracolumbar region      Physician: Evangelina Mcadams, *    Visit Date: 3/28/2019    Physician Orders: PT Eval and Treat   Medical Diagnosis from Referral: M54.5 (ICD-10-CM) - Lumbago   Evaluation Date: 3/7/2019  Authorization Period Expiration: 6/25/2019  Plan of Care Expiration: 5/31/2019  Visit # / Visits authorized: 5/ 20     Time In: 9:00 AM  Time Out: 9:55 AM  Total Billable Time: 55 minutes    Precautions: Standard    Subjective     Pt reports: mild soreness today shoulders from exercises, but overall feeling a little better.  She was compliant with home exercise program.  Response to previous treatment: relief with kineseotape  Functional change: no change    Pain: 2/10  Location:  low/mid back R>L      Objective     Agnes received therapeutic exercises to develop strength, ROM, flexibility, posture and core stabilization for 55 minutes including:  Piriformis stretch (figure 4) 3 x 30"  TrA 5" x 20  TrA PPT 5" x 20  Bridge with PPT 30x  Brace marching 2 min  Dying bug (leg extends longterm) 2 min  B KTC on ball x 3 min  LTR on ball x 3 min  QL stretch  SL clam with OTB 3 x 10  SL [place] 3 x 10  Alt LE lift x 10  Alt UE lift x 10  Alt UE/LE lift x 10 - NP  Child's pose x 3 min (fwd and to each side x 1 min)    5 min x preparation and application of Kineseotape. I strip to PVM thoracolumbar spine, X across low back. Patient received education regarding appropriate care and removal of Kinesiotape. Patient instructed in proper removal techniques if skin irritation occurs.      Home Exercises Provided and Patient Education Provided     Education provided:   - 3/19/19 new therex for HEP    Written Home Exercises Provided: " yes.  Exercises were reviewed and Agnes was able to demonstrate them prior to the end of the session.  Agnes demonstrated good  understanding of the education provided.     See EMR under Patient Instructions for exercises provided 3/19/2019.    Assessment     Good tolerance to therex today. Verbal cuing to decrease lumbar extension to limit pain with prone exercises. Good training effect noted, with pt requiring only minimal verbal cuing for familiar exercises.  Agnes is progressing well towards her goals.   Pt prognosis is Good.     Pt will continue to benefit from skilled outpatient physical therapy to address the deficits listed in the problem list box on initial evaluation, provide pt/family education and to maximize pt's level of independence in the home and community environment.     Pt's spiritual, cultural and educational needs considered and pt agreeable to plan of care and goals.     Anticipated barriers to physical therapy: chronicity of condition, transportation    Goals: IE 3/7/2019  Short Term Goals (4 Weeks):   1. Pt will report 20% reduction in pain of the lumbar spine for ease with ADL's. Progressing, not met  2. PT will demonstrate improved upright posture with minimal cuing for ease with functional positioning in home and community. Progressing, not met  3. Pt will report improved tolerance to standing >10 minutes to improve ability with chores including dishes. Progressing, not met     Long Term Goals (12 Weeks):   1. Pt will report being independent with HEP for maintenance of improvements gained during therapy sessions. Progressing, not met  2. PT will report 50% reduction of pain of the back for ease with walking. Progressing, not met  3. Pt will demonstrate trunk and extremity strength to >=4+/5 without the provocation of pain for ease with household chores. Progressing, not met  4. Pt will demonstrate appropriate upright posture without external cueing for ease with walking.  Progressing, not met  5. Pt to demonstrate improved functional ability with FOTO limitation <=30% disability. Progressing, not met      Plan     Plan of care Certification: 3/7/2019 to 5/31/2019.     Outpatient Physical Therapy 2 times weekly for 12 weeks to include the following interventions: Manual Therapy, Moist Heat/ Ice, Patient Education and Therapeutic Exercise.     Agnes Dee, PT

## 2019-03-28 ENCOUNTER — CLINICAL SUPPORT (OUTPATIENT)
Dept: REHABILITATION | Facility: OTHER | Age: 34
End: 2019-03-28
Payer: MEDICAID

## 2019-03-28 DIAGNOSIS — G89.29 CHRONIC BILATERAL THORACIC BACK PAIN: ICD-10-CM

## 2019-03-28 DIAGNOSIS — G89.29 CHRONIC BILATERAL LOW BACK PAIN WITHOUT SCIATICA: ICD-10-CM

## 2019-03-28 DIAGNOSIS — M54.6 CHRONIC BILATERAL THORACIC BACK PAIN: ICD-10-CM

## 2019-03-28 DIAGNOSIS — M54.50 CHRONIC BILATERAL LOW BACK PAIN WITHOUT SCIATICA: ICD-10-CM

## 2019-03-28 DIAGNOSIS — M41.25 OTHER IDIOPATHIC SCOLIOSIS, THORACOLUMBAR REGION: ICD-10-CM

## 2019-03-28 PROCEDURE — 97110 THERAPEUTIC EXERCISES: CPT | Mod: PN | Performed by: PHYSICAL THERAPIST

## 2019-04-01 NOTE — PROGRESS NOTES
"  Physical Therapy Daily Treatment Note     Name: Agnes Alonso  Clinic Number: [id number]    Therapy Diagnosis:   Encounter Diagnoses   Name Primary?    Chronic bilateral low back pain without sciatica     Chronic bilateral thoracic back pain     Other idiopathic scoliosis, thoracolumbar region      Physician: Evangelina Mcadams, *    Visit Date: 4/2/2019    Physician Orders: PT Eval and Treat   Medical Diagnosis from Referral: M54.5 (ICD-10-CM) - Lumbago   Evaluation Date: 3/7/2019  Authorization Period Expiration: 6/25/2019  Plan of Care Expiration: 5/31/2019  Visit # / Visits authorized: 6/ 20     Time In: 8:55 AM  Time Out: 9:55 AM  Total Billable Time: 55 minutes    Precautions: Standard    Subjective     Pt reports: "it's ok, it's always a little worse when it's that time of the month." Pt says overall she feels like pain level has been decreasing.  She was compliant with home exercise program.  Response to previous treatment: relief with kineseotape  Functional change: no change    Pain: 2/10  Location:  low/mid back R>L      Objective     Agnes received therapeutic exercises to develop strength, ROM, flexibility, posture and core stabilization for 55 minutes including:  Piriformis stretch (figure 4) 3 x 30"  TrA with ball press 5" x 2 min  TrA PPT 5" x 20  Bridge with PPT 30x  Brace marching 2 min - NP  Dying bug (leg extends assisted) 2 min  +Single leg lowering x 2 min  +Double leg lowering x 8  B KTC on ball x 3 min  LTR on ball x 3 min   QL stretch  SL clam with OTB 2 x 15  SL [place] 2 x 15  +Quadruped alt LE lift x 10  +Quadruped alt UE lift x 10  +Bird dog x 10  +Fire hydrant x 10  Child's pose x 3 min (fwd and to each side x 1 min) - NP    5 min x preparation and application of Kineseotape. I strip to PVM thoracolumbar spine, X across low back. Patient received education regarding appropriate care and removal of Kinesiotape. Patient instructed in proper removal techniques if skin " irritation occurs.      Home Exercises Provided and Patient Education Provided     Education provided:   - 3/19/19 new therex for HEP    Written Home Exercises Provided: yes.  Exercises were reviewed and Agnes was able to demonstrate them prior to the end of the session.  Agnes demonstrated good  understanding of the education provided.     See EMR under Patient Instructions for exercises provided 3/19/2019.    Assessment     Good tolerance to therex today. Core stability progressed today with min c/o fatigue/difficulty but able to perform. Quadruped exercises performed instead of prone today, with pt reported decreased pain.   Agnes is progressing well towards her goals.   Pt prognosis is Good.     Pt will continue to benefit from skilled outpatient physical therapy to address the deficits listed in the problem list box on initial evaluation, provide pt/family education and to maximize pt's level of independence in the home and community environment.     Pt's spiritual, cultural and educational needs considered and pt agreeable to plan of care and goals.     Anticipated barriers to physical therapy: chronicity of condition, transportation    Goals: IE 3/7/2019  Short Term Goals (4 Weeks):   1. Pt will report 20% reduction in pain of the lumbar spine for ease with ADL's. Progressing, not met  2. PT will demonstrate improved upright posture with minimal cuing for ease with functional positioning in home and community. Progressing, not met  3. Pt will report improved tolerance to standing >10 minutes to improve ability with chores including dishes. Progressing, not met     Long Term Goals (12 Weeks):   1. Pt will report being independent with HEP for maintenance of improvements gained during therapy sessions. Progressing, not met  2. PT will report 50% reduction of pain of the back for ease with walking. Progressing, not met  3. Pt will demonstrate trunk and extremity strength to >=4+/5 without the  provocation of pain for ease with household chores. Progressing, not met  4. Pt will demonstrate appropriate upright posture without external cueing for ease with walking. Progressing, not met  5. Pt to demonstrate improved functional ability with FOTO limitation <=30% disability. Progressing, not met      Plan     Plan of care Certification: 3/7/2019 to 5/31/2019.     Outpatient Physical Therapy 2 times weekly for 12 weeks to include the following interventions: Manual Therapy, Moist Heat/ Ice, Patient Education and Therapeutic Exercise.     Agnes Dee, PT

## 2019-04-02 ENCOUNTER — CLINICAL SUPPORT (OUTPATIENT)
Dept: REHABILITATION | Facility: OTHER | Age: 34
End: 2019-04-02
Payer: MEDICAID

## 2019-04-02 DIAGNOSIS — G89.29 CHRONIC BILATERAL LOW BACK PAIN WITHOUT SCIATICA: ICD-10-CM

## 2019-04-02 DIAGNOSIS — M41.25 OTHER IDIOPATHIC SCOLIOSIS, THORACOLUMBAR REGION: ICD-10-CM

## 2019-04-02 DIAGNOSIS — G89.29 CHRONIC BILATERAL THORACIC BACK PAIN: ICD-10-CM

## 2019-04-02 DIAGNOSIS — M54.50 CHRONIC BILATERAL LOW BACK PAIN WITHOUT SCIATICA: ICD-10-CM

## 2019-04-02 DIAGNOSIS — M54.6 CHRONIC BILATERAL THORACIC BACK PAIN: ICD-10-CM

## 2019-04-02 PROCEDURE — 97110 THERAPEUTIC EXERCISES: CPT | Mod: PN | Performed by: PHYSICAL THERAPIST

## 2019-04-08 NOTE — PROGRESS NOTES
"  Physical Therapy Daily Treatment Note     Name: Agnes Alonso  Clinic Number: [id number]    Therapy Diagnosis:   Encounter Diagnoses   Name Primary?    Chronic bilateral low back pain without sciatica     Chronic bilateral thoracic back pain     Other idiopathic scoliosis, thoracolumbar region      Physician: Evangelina Mcadams, *    Visit Date: 4/9/2019    Physician Orders: PT Eval and Treat   Medical Diagnosis from Referral: M54.5 (ICD-10-CM) - Lumbago   Evaluation Date: 3/7/2019  Authorization Period Expiration: 6/25/2019  Plan of Care Expiration: 5/31/2019  Visit # / Visits authorized: 7/ 20     Time In: 9:00 AM  Time Out: 10:00 AM  Total Billable Time: 60 minutes    Precautions: Standard    Subjective     Pt reports: feeling a little sore today, but not as bad. Had to miss last appointment because of the bad weather since she takes the city bus.  She was compliant with home exercise program.  Response to previous treatment: relief with kineseotape  Functional change: no change    Pain: 1/10  Location:  low/mid back R>L      Objective     Agnes received therapeutic exercises to develop strength, ROM, flexibility, posture and core stabilization for 55 minutes including:  Piriformis stretch (figure 4) 3 x 30"  TrA with ball press 5" x 2 min  TrA PPT 5" x 20  Bridge with PPT 30x  Brace marching (90/90 heel taps) 2 min   Dying bug (leg extends USP) 2 min  Single leg lowering x 2 min  Double leg lowering x 8  B KTC on ball x 3 min  LTR on ball x 3 min   QL stretch - NP  SL clam with GTB 2 x 15  SL [place] with YTB 2 x 15  Quadruped alt LE lift x 10  Quadruped alt UE lift x 10  Bird dog x 10  Fire hydrant x 10  Child's pose x 3 min (fwd and to each side x 1 min) - NP  +SALPD with GTB 1 x 15    5 min x preparation and application of Kineseotape. I strip to PVM thoracolumbar spine, X across low back. Patient received education regarding appropriate care and removal of Kinesiotape. Patient " HISTORY OF PRESENT ILLNESS  Monique Ca is a 71 y.o. male. He was seen for left knee and rt hip pain. HPI  Knee Pain  Patient complains of left knee pain. Onset of the symptoms was several days ago. Inciting event: none known. Current symptoms include pain location: left sided: anterior, severity of pain = moderate, knee gives out and knee locks. Associated symptoms: knee locking, popping sensation and morning stiffness. Aggravating symptoms: going up and down stairs, rising after sitting. Patient's overall course: symptoms have progressed to a point and plateaued. and course of pain: symptoms have progressed to a point and plateaued. Patient has had no prior knee problems. Patient denies weight loss, fever, direct/indirect injury. Evaluation to date: none. Treatment to date: OTC analgesics: somewhat effective. Hip Pain  Patient complains of right hip pain. Onset of the symptoms was several months ago. Inciting event: none. Current symptoms include right sided hip pain. Severity = moderate  right sided buttock pain. Severity = moderate  location: posterior  radiates to: right sided  upper leg(s): lateral  worse with weight bearing. Associated symptoms: none. Aggravating symptoms: going up and down stairs, rising after sitting. Patient's overall course: gradually improving and course of pain: gradually improving. Patient has had prior hip problems. Previous visits for this problem: yes, last seen a few weeks ago by me. Evaluation to date: none. Treatment to date: prescription analgesics per med orders: effective  home exercise program[de-identified] effective. Review of Systems   Constitutional: Negative for chills, fever and malaise/fatigue. HENT: Negative for congestion, ear pain, sore throat and tinnitus. Eyes: Negative for blurred vision, double vision, pain and discharge. Respiratory: Negative for cough, shortness of breath and wheezing.     Cardiovascular: Negative for chest pain, palpitations and leg instructed in proper removal techniques if skin irritation occurs.      Home Exercises Provided and Patient Education Provided     Education provided:   - 3/19/19 new therex for HEP    Written Home Exercises Provided: yes.  Exercises were reviewed and Agnes was able to demonstrate them prior to the end of the session.  Agnes demonstrated good  understanding of the education provided.     See EMR under Patient Instructions for exercises provided 3/19/2019.    Assessment     Good tolerance to therex today. Mild c/o difficulty with leg lowering exercises, but able to perform.  Agnes is progressing well towards her goals.   Pt prognosis is Good.     Pt will continue to benefit from skilled outpatient physical therapy to address the deficits listed in the problem list box on initial evaluation, provide pt/family education and to maximize pt's level of independence in the home and community environment.     Pt's spiritual, cultural and educational needs considered and pt agreeable to plan of care and goals.     Anticipated barriers to physical therapy: chronicity of condition, transportation    Goals: IE 3/7/2019  Short Term Goals (4 Weeks):   1. Pt will report 20% reduction in pain of the lumbar spine for ease with ADL's. Progressing, not met  2. PT will demonstrate improved upright posture with minimal cuing for ease with functional positioning in home and community. Progressing, not met  3. Pt will report improved tolerance to standing >10 minutes to improve ability with chores including dishes. Progressing, not met     Long Term Goals (12 Weeks):   1. Pt will report being independent with HEP for maintenance of improvements gained during therapy sessions. Progressing, not met  2. PT will report 50% reduction of pain of the back for ease with walking. Progressing, not met  3. Pt will demonstrate trunk and extremity strength to >=4+/5 without the provocation of pain for ease with household chores. Progressing,  swelling. Gastrointestinal: Negative for abdominal pain, blood in stool, constipation, diarrhea, nausea and vomiting. Genitourinary: Negative for dysuria, frequency, hematuria and urgency. Musculoskeletal: Negative for back pain, joint pain and myalgias. Left knee pain, rt hip pain   Skin: Negative for rash. Neurological: Negative for dizziness, tremors, seizures and headaches. Endo/Heme/Allergies: Negative for polydipsia. Does not bruise/bleed easily. Psychiatric/Behavioral: Negative for depression and substance abuse. The patient is not nervous/anxious. Physical Exam   Constitutional: He is oriented to person, place, and time. He appears well-developed and well-nourished. HENT:   Head: Normocephalic and atraumatic. Right Ear: External ear normal.   Mouth/Throat: Oropharynx is clear and moist. No oropharyngeal exudate. Eyes: Conjunctivae and EOM are normal. Pupils are equal, round, and reactive to light. No scleral icterus. Neck: Normal range of motion. Neck supple. No JVD present. No thyromegaly present. Cardiovascular: Normal rate, regular rhythm, normal heart sounds and intact distal pulses. No murmur heard. Pulmonary/Chest: Effort normal and breath sounds normal. He has no wheezes. Abdominal: Soft. Bowel sounds are normal. He exhibits no distension and no mass. Musculoskeletal: Normal range of motion. He exhibits no edema. Right hip: He exhibits tenderness. He exhibits normal range of motion and normal strength. Left knee: He exhibits normal range of motion, no swelling and no effusion. Tenderness found. Medial joint line tenderness noted. Legs:  RUSTAM, FADIR,SLR negative both sides  Tenderness on rt greater trochanter   Lymphadenopathy:     He has no cervical adenopathy. Neurological: He is alert and oriented to person, place, and time. He has normal reflexes. No cranial nerve deficit. Skin: Skin is warm and dry. No rash noted.  He is not diaphoretic. Psychiatric: He has a normal mood and affect. Nursing note and vitals reviewed. ASSESSMENT and PLAN  Diagnoses and all orders for this visit:    1. Acute pain of left knee  -     URIC ACID  -     C REACTIVE PROTEIN, QT  -     RHEUMASSURE    2. Patellofemoral pain syndrome of left knee    3. Trochanteric bursitis of right hip    4. Overweight (BMI 25.0-29. 9): Discussed abnormal weight, ideal BMI and importance of diet and exercise to loose weight. Referral for exercise program was offered. Xray negative for major finding  Discussed lifestyle issues and health guidance given  Patient was given an after visit summary which includes diagnoses, vital signs, current medications, instructions and references & authorized prescriptions . Results of labs will be conveyed to patient, once available. Pt verbalized instructions I provided and expressed understanding of discussion that was held today. Follow-up Disposition:  Return if symptoms worsen or fail to improve. not met  4. Pt will demonstrate appropriate upright posture without external cueing for ease with walking. Progressing, not met  5. Pt to demonstrate improved functional ability with FOTO limitation <=30% disability. Progressing, not met      Plan     Plan of care Certification: 3/7/2019 to 5/31/2019.     Outpatient Physical Therapy 2 times weekly for 12 weeks to include the following interventions: Manual Therapy, Moist Heat/ Ice, Patient Education and Therapeutic Exercise.     Agnes Dee, PT

## 2019-04-09 ENCOUNTER — CLINICAL SUPPORT (OUTPATIENT)
Dept: REHABILITATION | Facility: OTHER | Age: 34
End: 2019-04-09
Payer: MEDICAID

## 2019-04-09 DIAGNOSIS — G89.29 CHRONIC BILATERAL LOW BACK PAIN WITHOUT SCIATICA: ICD-10-CM

## 2019-04-09 DIAGNOSIS — M54.6 CHRONIC BILATERAL THORACIC BACK PAIN: ICD-10-CM

## 2019-04-09 DIAGNOSIS — M41.25 OTHER IDIOPATHIC SCOLIOSIS, THORACOLUMBAR REGION: ICD-10-CM

## 2019-04-09 DIAGNOSIS — G89.29 CHRONIC BILATERAL THORACIC BACK PAIN: ICD-10-CM

## 2019-04-09 DIAGNOSIS — M54.50 CHRONIC BILATERAL LOW BACK PAIN WITHOUT SCIATICA: ICD-10-CM

## 2019-04-09 PROCEDURE — 97110 THERAPEUTIC EXERCISES: CPT | Mod: PN | Performed by: PHYSICAL THERAPIST

## 2020-07-27 ENCOUNTER — PATIENT OUTREACH (OUTPATIENT)
Dept: ADMINISTRATIVE | Facility: HOSPITAL | Age: 35
End: 2020-07-27

## 2020-07-27 NOTE — PROGRESS NOTES
Pre-visit chart review completed.  Immunizations reviewed and updated.    Patient due for the following    Hepatitis C Screening     Pneumococcal Vaccine (Medium Risk) (1 of 1 - PPSV23)    Cervical Cancer Screening       Patient new to provider.

## 2020-08-10 ENCOUNTER — LAB VISIT (OUTPATIENT)
Dept: LAB | Facility: HOSPITAL | Age: 35
End: 2020-08-10
Attending: FAMILY MEDICINE
Payer: COMMERCIAL

## 2020-08-10 ENCOUNTER — OFFICE VISIT (OUTPATIENT)
Dept: PRIMARY CARE CLINIC | Facility: CLINIC | Age: 35
End: 2020-08-10
Payer: COMMERCIAL

## 2020-08-10 ENCOUNTER — TELEPHONE (OUTPATIENT)
Dept: NEUROLOGY | Facility: CLINIC | Age: 35
End: 2020-08-10

## 2020-08-10 VITALS
DIASTOLIC BLOOD PRESSURE: 80 MMHG | SYSTOLIC BLOOD PRESSURE: 104 MMHG | TEMPERATURE: 98 F | HEIGHT: 67 IN | WEIGHT: 133.81 LBS | BODY MASS INDEX: 21 KG/M2 | HEART RATE: 81 BPM | OXYGEN SATURATION: 99 %

## 2020-08-10 DIAGNOSIS — F17.210 CIGARETTE NICOTINE DEPENDENCE WITHOUT COMPLICATION: ICD-10-CM

## 2020-08-10 DIAGNOSIS — B00.9 HSV INFECTION: ICD-10-CM

## 2020-08-10 DIAGNOSIS — Z12.4 PAP SMEAR FOR CERVICAL CANCER SCREENING: ICD-10-CM

## 2020-08-10 DIAGNOSIS — R51.9 CHRONIC NONINTRACTABLE HEADACHE, UNSPECIFIED HEADACHE TYPE: ICD-10-CM

## 2020-08-10 DIAGNOSIS — Z76.89 ESTABLISHING CARE WITH NEW DOCTOR, ENCOUNTER FOR: Primary | ICD-10-CM

## 2020-08-10 DIAGNOSIS — M54.50 CHRONIC BILATERAL LOW BACK PAIN WITHOUT SCIATICA: ICD-10-CM

## 2020-08-10 DIAGNOSIS — G89.29 CHRONIC NONINTRACTABLE HEADACHE, UNSPECIFIED HEADACHE TYPE: ICD-10-CM

## 2020-08-10 DIAGNOSIS — Z11.51 SCREENING FOR HUMAN PAPILLOMAVIRUS (HPV): ICD-10-CM

## 2020-08-10 DIAGNOSIS — B19.20 HEPATITIS C VIRUS INFECTION WITHOUT HEPATIC COMA, UNSPECIFIED CHRONICITY: ICD-10-CM

## 2020-08-10 DIAGNOSIS — H92.01 RIGHT EAR PAIN: ICD-10-CM

## 2020-08-10 DIAGNOSIS — F19.91 HISTORY OF ILLICIT DRUG USE: ICD-10-CM

## 2020-08-10 DIAGNOSIS — Z01.89 ENCOUNTER FOR ROUTINE LABORATORY TESTING: ICD-10-CM

## 2020-08-10 DIAGNOSIS — Z13.6 ENCOUNTER FOR LIPID SCREENING FOR CARDIOVASCULAR DISEASE: ICD-10-CM

## 2020-08-10 DIAGNOSIS — Z13.220 ENCOUNTER FOR LIPID SCREENING FOR CARDIOVASCULAR DISEASE: ICD-10-CM

## 2020-08-10 DIAGNOSIS — G89.29 CHRONIC BILATERAL LOW BACK PAIN WITHOUT SCIATICA: ICD-10-CM

## 2020-08-10 PROCEDURE — 85027 COMPLETE CBC AUTOMATED: CPT

## 2020-08-10 PROCEDURE — 86803 HEPATITIS C AB TEST: CPT

## 2020-08-10 PROCEDURE — 99999 PR PBB SHADOW E&M-EST. PATIENT-LVL V: ICD-10-PCS | Mod: PBBFAC,,, | Performed by: FAMILY MEDICINE

## 2020-08-10 PROCEDURE — 87624 HPV HI-RISK TYP POOLED RSLT: CPT

## 2020-08-10 PROCEDURE — 88175 CYTOPATH C/V AUTO FLUID REDO: CPT

## 2020-08-10 PROCEDURE — 36415 COLL VENOUS BLD VENIPUNCTURE: CPT | Mod: PN

## 2020-08-10 PROCEDURE — 99204 OFFICE O/P NEW MOD 45 MIN: CPT | Mod: S$GLB,,, | Performed by: FAMILY MEDICINE

## 2020-08-10 PROCEDURE — 99999 PR PBB SHADOW E&M-EST. PATIENT-LVL V: CPT | Mod: PBBFAC,,, | Performed by: FAMILY MEDICINE

## 2020-08-10 PROCEDURE — 3008F BODY MASS INDEX DOCD: CPT | Mod: CPTII,S$GLB,, | Performed by: FAMILY MEDICINE

## 2020-08-10 PROCEDURE — 87902 NFCT AGT GNTYP ALYS HEP C: CPT

## 2020-08-10 PROCEDURE — 3008F PR BODY MASS INDEX (BMI) DOCUMENTED: ICD-10-PCS | Mod: CPTII,S$GLB,, | Performed by: FAMILY MEDICINE

## 2020-08-10 PROCEDURE — 80053 COMPREHEN METABOLIC PANEL: CPT

## 2020-08-10 PROCEDURE — 99204 PR OFFICE/OUTPT VISIT, NEW, LEVL IV, 45-59 MIN: ICD-10-PCS | Mod: S$GLB,,, | Performed by: FAMILY MEDICINE

## 2020-08-10 RX ORDER — IBUPROFEN 200 MG
200 TABLET ORAL EVERY 6 HOURS PRN
COMMUNITY
End: 2022-05-03

## 2020-08-10 NOTE — PROGRESS NOTES
Subjective:       Patient ID: Agnes Alonso is a 35 y.o. female.    Chief Complaint: [facility]    34 yo female presents to address concerns.    She has right ear feeling cloggeged as if she wants to pop it. Not particularly bothersome today. Taking allergy mecidne, CBD oil, and NSAIDs.  No drainage.    She has chronic back pain and neck pain. Also has occipital headaches which differ from sinus headaches.  She fell 25 feet at age 17 and sustained vetefral fracture. She eventually completed at least 3 rounds of physical therpay. She is not interested in narcotic therapy as she appropriately identifies that she has a history of addiction. She has been drug free for 18 months. She used to do heroin, cocaine and abuse prescription drugs. The only drug she wasn't using was crystal meth. She would inject drugs 2 years ago. One year ago (2019) she tested positive for hepatitis C with low viral load and unremarkable ultrasound; advised to repeat Hepatitis C (she is overdue for this).    She is not up to date on pap smear.  No active herpes outbreak.    The following portions of the patient's history were reviewed and updated as appropriate: allergies, current medications, past family history, past medical history, past social history, past surgical history and problem list.        Review of Systems   Constitutional: Negative for activity change, appetite change, chills, diaphoresis, fatigue, fever and unexpected weight change.   HENT: Positive for ear pain. Negative for nasal congestion, dental problem, facial swelling, nosebleeds, postnasal drip, rhinorrhea, sore throat, tinnitus and trouble swallowing.    Eyes: Negative for pain, discharge, itching and visual disturbance.   Respiratory: Negative for apnea, chest tightness, shortness of breath, wheezing and stridor.    Cardiovascular: Negative for chest pain, palpitations and leg swelling.   Gastrointestinal: Negative for abdominal distention, abdominal pain,  "constipation, diarrhea, nausea, rectal pain and vomiting.   Endocrine: Negative for cold intolerance, heat intolerance and polyuria.   Genitourinary: Negative for difficulty urinating, dysuria, frequency, hematuria and urgency.   Musculoskeletal: Positive for arthralgias, back pain, myalgias, neck pain and neck stiffness.   Integumentary:  Negative for color change and rash.   Neurological: Positive for headaches. Negative for dizziness, tremors, seizures, syncope, facial asymmetry and weakness.   Hematological: Negative for adenopathy. Does not bruise/bleed easily.   Psychiatric/Behavioral: Negative for agitation, confusion, hallucinations, self-injury and suicidal ideas. The patient is not hyperactive.          Objective:       Vitals:    08/10/20 1323   BP: 104/80   Pulse: 81   Temp: 98.2 °F (36.8 °C)   TempSrc: Oral   SpO2: 99%   Weight: 60.7 kg (133 lb 13.1 oz)   Height: 5' 7" (1.702 m)     Physical Exam  Constitutional:       General: She is not in acute distress.     Appearance: She is well-developed. She is not diaphoretic.   HENT:      Head: Normocephalic and atraumatic.      Comments: No erythematous tympanic membranes; ear canals clear     Right Ear: External ear normal.      Left Ear: External ear normal.   Eyes:      General: No scleral icterus.        Right eye: No discharge.         Left eye: No discharge.      Conjunctiva/sclera: Conjunctivae normal.      Pupils: Pupils are equal, round, and reactive to light.   Neck:      Musculoskeletal: Normal range of motion and neck supple.      Thyroid: No thyromegaly.   Cardiovascular:      Rate and Rhythm: Normal rate and regular rhythm.      Heart sounds: Normal heart sounds. No murmur. No friction rub. No gallop.    Pulmonary:      Effort: Pulmonary effort is normal. No respiratory distress.      Breath sounds: Normal breath sounds.   Chest:      Chest wall: No tenderness.   Abdominal:      General: Bowel sounds are normal. There is no distension.      " Palpations: Abdomen is soft. There is no mass.      Tenderness: There is no abdominal tenderness. There is no guarding or rebound.   Genitourinary:     Comments: Pelvic:  Vagina and cervix without lesions or discharge. Uterus and adnexa/parametria nontender without masses.    Musculoskeletal:      Comments: Musculoskeletal:  Steady gait.  No misalignment, no asymmetry, no crepitation, no defects, no  tenderness, no masses, no effusions, no decreased range of motion, no instability, no atrophy or abnormal strength or tone in the head, neck, spine, ribs, pelvis or extremities.     Neurological: CN 2-12 normal. Sensation to pain, touch, and proprioception normal.        Lymphadenopathy:      Cervical: No cervical adenopathy.   Skin:     General: Skin is warm.      Capillary Refill: Capillary refill takes less than 2 seconds.      Coloration: Skin is not jaundiced.      Findings: No rash.   Neurological:      Mental Status: She is alert and oriented to person, place, and time.      Cranial Nerves: No cranial nerve deficit.      Sensory: No sensory deficit.      Motor: No abnormal muscle tone.      Coordination: Coordination normal.      Deep Tendon Reflexes: Reflexes normal.   Psychiatric:         Thought Content: Thought content normal.         Judgment: Judgment normal.         Assessment:       1. Establishing care with new doctor, encounter for    2. Right ear pain    3. Hepatitis C virus infection without hepatic coma, unspecified chronicity    4. Chronic bilateral low back pain without sciatica    5. Chronic nonintractable headache, unspecified headache type    6. Cigarette nicotine dependence without complication    7. History of illicit drug use    8. Encounter for routine laboratory testing    9. Pap smear for cervical cancer screening    10. Screening for human papillomavirus (HPV)    11. HSV infection        Plan:       1. Establishing care with new doctor, encounter for  Medications were reviewed and  reconciled with refills sent to pharmacy as needed. Health maintenance was reviewed with recommendations per age and sex. Patient has contact information to get hold of us as needed and is encouraged to use the patient portal system.    2. Right ear pain  -     Ambulatory referral/consult to ENT; Future; Expected date: 08/17/2020    3. Hepatitis C virus infection without hepatic coma, unspecified chronicity  -     Hepatitis C Antibody; Future; Expected date: 08/10/2020  -     Cancel: HEPATITIS C RNA, QUANTITATIVE, PCR; Future; Expected date: 08/10/2020  -     Hepatitis C Genotype; Future; Expected date: 08/10/2020  Pneumonia vaccine would be indicated.    4. Chronic bilateral low back pain without sciatica  -     Ambulatory referral/consult to [facility]; Future; Expected date: 08/17/2020  -     Ambulatory referral/consult to Pain Clinic; Future; Expected date: 08/17/2020  Avoid narcotics. Optimize non narcotic therapy: topical cream/ gel, heat/cold therapy, massage, accupuncture, Gabapentin/ Lyrica, Cymbalta, cryotherapy etc    5. Chronic nonintractable headache, unspecified headache type  -     Ambulatory referral/consult to Neurology; Future; Expected date: 08/17/2020    6. Cigarette nicotine dependence without complication  She is in the precontemplation phase of smoking cessation. PPSV 23 indicated.    7. History of illicit drug use  Congratulated on continued abstinence.    8. Encounter for routine laboratory testing  -     CBC Without Differential; Future; Expected date: 08/10/2020  -     Comprehensive metabolic panel; Future; Expected date: 08/10/2020    9. Pap smear for cervical cancer screening  -     Liquid-Based Pap Smear, Screening    10. Screening for human papillomavirus (HPV)  -     HPV High Risk Genotypes, PCR    11. HSV infection, asymptomatic  Valtrex if needed.    Disclaimer: This note has been generated using voice-recognition software. There may be typographical errors that have been  missed during proof-reading

## 2020-08-11 LAB
ALBUMIN SERPL BCP-MCNC: 4.2 G/DL (ref 3.5–5.2)
ALP SERPL-CCNC: 92 U/L (ref 55–135)
ALT SERPL W/O P-5'-P-CCNC: 100 U/L (ref 10–44)
ANION GAP SERPL CALC-SCNC: 8 MMOL/L (ref 8–16)
AST SERPL-CCNC: 55 U/L (ref 10–40)
BILIRUB SERPL-MCNC: 0.2 MG/DL (ref 0.1–1)
BUN SERPL-MCNC: 9 MG/DL (ref 6–20)
CALCIUM SERPL-MCNC: 9.3 MG/DL (ref 8.7–10.5)
CHLORIDE SERPL-SCNC: 107 MMOL/L (ref 95–110)
CO2 SERPL-SCNC: 26 MMOL/L (ref 23–29)
CREAT SERPL-MCNC: 0.8 MG/DL (ref 0.5–1.4)
ERYTHROCYTE [DISTWIDTH] IN BLOOD BY AUTOMATED COUNT: 12.2 % (ref 11.5–14.5)
EST. GFR  (AFRICAN AMERICAN): >60 ML/MIN/1.73 M^2
EST. GFR  (NON AFRICAN AMERICAN): >60 ML/MIN/1.73 M^2
GLUCOSE SERPL-MCNC: 79 MG/DL (ref 70–110)
HCT VFR BLD AUTO: 48.9 % (ref 37–48.5)
HCV AB SERPL QL IA: POSITIVE
HGB BLD-MCNC: 15.3 G/DL (ref 12–16)
MCH RBC QN AUTO: 32.2 PG (ref 27–31)
MCHC RBC AUTO-ENTMCNC: 31.3 G/DL (ref 32–36)
MCV RBC AUTO: 103 FL (ref 82–98)
PLATELET # BLD AUTO: 307 K/UL (ref 150–350)
PMV BLD AUTO: 11.3 FL (ref 9.2–12.9)
POTASSIUM SERPL-SCNC: 4.3 MMOL/L (ref 3.5–5.1)
PROT SERPL-MCNC: 7.7 G/DL (ref 6–8.4)
RBC # BLD AUTO: 4.75 M/UL (ref 4–5.4)
SODIUM SERPL-SCNC: 141 MMOL/L (ref 136–145)
WBC # BLD AUTO: 8 K/UL (ref 3.9–12.7)

## 2020-08-12 ENCOUNTER — OFFICE VISIT (OUTPATIENT)
Dept: NEUROLOGY | Facility: CLINIC | Age: 35
End: 2020-08-12
Payer: COMMERCIAL

## 2020-08-12 DIAGNOSIS — R51.9 CHRONIC NONINTRACTABLE HEADACHE, UNSPECIFIED HEADACHE TYPE: ICD-10-CM

## 2020-08-12 DIAGNOSIS — G54.2 CERVICAL SYNDROME: ICD-10-CM

## 2020-08-12 DIAGNOSIS — G89.29 CHRONIC NONINTRACTABLE HEADACHE, UNSPECIFIED HEADACHE TYPE: ICD-10-CM

## 2020-08-12 PROCEDURE — 99204 OFFICE O/P NEW MOD 45 MIN: CPT | Mod: 95,,, | Performed by: NEUROMUSCULOSKELETAL MEDICINE & OMM

## 2020-08-12 PROCEDURE — 99204 PR OFFICE/OUTPT VISIT, NEW, LEVL IV, 45-59 MIN: ICD-10-PCS | Mod: 95,,, | Performed by: NEUROMUSCULOSKELETAL MEDICINE & OMM

## 2020-08-12 RX ORDER — CYCLOBENZAPRINE HCL 10 MG
10 TABLET ORAL NIGHTLY
Qty: 30 TABLET | Refills: 0 | Status: SHIPPED | OUTPATIENT
Start: 2020-08-12 | End: 2020-08-22

## 2020-08-12 NOTE — LETTER
August 12, 2020      Medina Arellano MD  [address] [facility] [address]           [place] - Neurology  [address] [facility]  [facility] [address]  Phone: [phone]          Patient: Agnes Alonso   MR Number: [id number]   YOB: 1985   Date of Visit: 8/12/2020       Dear Dr. Medina Arellano:    Thank you for referring Agnes Alonso to me for evaluation. Attached you will find relevant portions of my assessment and plan of care.    If you have questions, please do not hesitate to call me. I look forward to following Agnes Alonso along with you.    Sincerely,    Dez Rojo MD    Enclosure  CC:  No Recipients    If you would like to receive this communication electronically, please contact [email or web address] or [phone] to request more information on [employer] access.    For providers and/or their staff who would like to refer a patient to [facility], please contact us through our one-stop-shop provider referral line, [facility] [place], at [phone].    If you feel you have received this communication in error or would no longer like to receive these types of communications, please e-mail [email or web address]

## 2020-08-12 NOTE — PROGRESS NOTES
This note was dictated with Modal Fluency a word recognition program. There are occasionally word recognition errors which are missed on review.  Agnes Karolyn Alonso  1985  Review of patient's allergies indicates:   Allergen Reactions    Penicillins Hives     [unfilled]    Past Medical History:   Diagnosis Date    Anxiety     Depression     Herpes genitalia      Social History     Socioeconomic History    Marital status: Single     Spouse name: Not on file    Number of children: Not on file    Years of education: Not on file    Highest education level: Not on file   Occupational History    Not on file   Social Needs    Financial resource strain: Not on file    Food insecurity     Worry: Not on file     Inability: Not on file    Transportation needs     Medical: Not on file     Non-medical: Not on file   Tobacco Use    Smoking status: Current Every Day Smoker     Packs/day: 1.00    Smokeless tobacco: Never Used   Substance and Sexual Activity    Alcohol use: Never     Frequency: Never    Drug use: Not Currently    Sexual activity: Yes     Partners: Male     Birth control/protection: None   Lifestyle    Physical activity     Days per week: Not on file     Minutes per session: Not on file    Stress: To some extent   Relationships    Social connections     Talks on phone: Not on file     Gets together: Not on file     Attends Jewish service: Not on file     Active member of club or organization: Not on file     Attends meetings of clubs or organizations: Not on file     Relationship status: Not on file   Other Topics Concern    Not on file   Social History Narrative    Not on file     Family History   Problem Relation Age of Onset    Depression Mother     Hypertension Mother     Depression Sister     Hypertension Paternal Grandmother     Diabetes Maternal Uncle     Breast cancer Neg Hx     Ovarian cancer Neg Hx        Review of  systems:  Constitutional-negative  Eyes-negative  ENT, mouth-negative  Cardiovascular-negative  Respiratory-negative  GI-negative  - negative  Musculoskeletal-negative  Skin-negative  Neurologic-negative  Psychiatric-negative  Endocrine-negative  Hematology/lymph nodes-negative  Allergies/immunology-negative  Agnes Alonso  1985  Review of patient's allergies indicates:   Allergen Reactions    Penicillins Hives     [unfilled]    Past Medical History:   Diagnosis Date    Anxiety     Depression     Herpes genitalia      Social History     Socioeconomic History    Marital status: Single     Spouse name: Not on file    Number of children: Not on file    Years of education: Not on file    Highest education level: Not on file   Occupational History    Not on file   Social Needs    Financial resource strain: Not on file    Food insecurity     Worry: Not on file     Inability: Not on file    Transportation needs     Medical: Not on file     Non-medical: Not on file   Tobacco Use    Smoking status: Current Every Day Smoker     Packs/day: 1.00    Smokeless tobacco: Never Used   Substance and Sexual Activity    Alcohol use: Never     Frequency: Never    Drug use: Not Currently    Sexual activity: Yes     Partners: Male     Birth control/protection: None   Lifestyle    Physical activity     Days per week: Not on file     Minutes per session: Not on file    Stress: To some extent   Relationships    Social connections     Talks on phone: Not on file     Gets together: Not on file     Attends Druze service: Not on file     Active member of club or organization: Not on file     Attends meetings of clubs or organizations: Not on file     Relationship status: Not on file   Other Topics Concern    Not on file   Social History Narrative    Not on file     Family History   Problem Relation Age of Onset    Depression Mother     Hypertension Mother     Depression Sister     Hypertension  Paternal Grandmother     Diabetes Maternal Uncle     Breast cancer Neg Hx     Ovarian cancer Neg Hx        Review of systems:  Constitutional-negative  Eyes-negative  ENT, mouth-negative  Cardiovascular-negative  Respiratory-negative  GI-negative  - negative  Musculoskeletal-negative  Skin-negative  Neurologic-negative  Psychiatric-negative  Endocrine-negative  Hematology/lymph nodes-negative  Allergies/immunology-negative  Gen. 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  The patient location is:[place]  The chief complaint leading to consultation is: headache  Visit type: visual  Face to Face time with patient:20min    20minutes of total time spent on the encounter, which includes face to face time and non-face to face time preparing to see the patient (eg, review of tests), Obtaining and/or reviewing separately obtained history, Documenting clinical information in the electronic or other health record, Independently interpreting results (not separately reported) and communicating results to the patient/family/caregiver, or Care coordination (not separately reported).         Each patient to whom he or she provides medical services by telemedicine is:  (1) informed of the relationship between the physician and patient and the respective role of any other health care provider with respect to management of the patient; and (2) notified that he or she may decline to receive medical services by telemedicine and may withdraw from such care at any time.    Present history:   This is a 35-year-old white female evaluated per video for headaches.  Patient has been having daily headaches for the last year.  Motrin helps some however does not make it go away.  Her last bad headache was 2 days ago in  the back of the head 8/10 intensity with no nausea vomiting or photophobia.  She has no history of migraines.  Headaches are worse in the mid afternoon.  She has had significant back problems and is scheduled to see pain management for her low back.  She has been taking TBD oil .  Her back problems are related to a 25 ft fall at the age of 17 years old.  She has taken baclofen and Flexeril in the past.      Neurological Exam:  Deferred due to video  Cervical examination:  Slight decrease left range of motion with no pain  Cervical tenderness :negative  Impression:  Chronic headaches; cervical syndrome; history low back pain; history lumbar/thoracic trauma at 17 years old from a fall    Recommendations/Plan :  Follow-up with pain management for back and any suggestions on headache/neck pain;  in the meantime will start her on Flexeril 10 mg HS to start with half tablet for several days and increase to a whole tablet if needed.  Would continue Motrin 600 mg with food b.i.d.; follow-up 2 months

## 2020-08-14 NOTE — PROGRESS NOTES
[facility] New Patient Evaluation    Referred by: Dr Medina Arellano  Reason for referral: Low back pain    CC:   Chief Complaint   Patient presents with    Low-back Pain     No flowsheet data found.    HPI:   Agnes Alonso is a 35 y.o. female who complains of low back pain which she attributes to a severe fall at age 17 yrs when she suffered considerable low back damage.  She reports having crushed vertebra near the thoracolumbar spine, though all of her pain is across the low back between the hips.  She reports a Hx of medication abuse and addiction and states a strong preference to avoid medications that might cause dependency or addiction.    Location: low back pain   Onset: at age 17 yrs  Current Pain Score: 3/10  Daily Pain of Range: 3-9/10  Quality: Aching, Dull, Burning, Throbbing, Tight and Deep  Radiation: mid-upper back area  Worsened by: running  Improved by: heat and massage    Previous Therapies:  PT/OT: Yes, completed 3x in the past 18 years.  Last in 2019.  HEP: Denies regular exercise beyond stretching.  Interventions: Denies  Surgery: Denies  Medications:   - NSAIDS:   - MSK Relaxants:   - TCAs:   - SNRIs:   - Topicals:   - Anticonvulsants:  - Opioids:     Current Pain Medications:  1. Ibuprofen 600 mg TID PRN - works well to eliminate/ prevent severe pain     Review of Systems:  Review of Systems   Constitutional: Negative for chills and fever.   HENT: Negative for nosebleeds.    Eyes: Negative for blurred vision.   Respiratory: Negative for hemoptysis.    Cardiovascular: Negative for chest pain and palpitations.   Gastrointestinal: Negative for heartburn and vomiting.   Genitourinary: Negative for hematuria.   Musculoskeletal: Positive for back pain. Negative for myalgias.   Skin: Negative for rash.   Neurological: Negative for tingling, seizures, loss of consciousness and weakness.   Endo/Heme/Allergies: Does not bruise/bleed easily.   Psychiatric/Behavioral: Positive  for depression. Negative for hallucinations.       History:    Current Outpatient Medications:     cyclobenzaprine (FLEXERIL) 10 MG tablet, Take 1 tablet (10 mg total) by mouth nightly. for 10 days, Disp: 30 tablet, Rfl: 0    ibuprofen (ADVIL,MOTRIN) 200 MG tablet, Take 200 mg by mouth every 6 (six) hours as needed for Pain., Disp: , Rfl:     valacyclovir (VALTREX) 500 MG tablet, Take 1 tablet (500 mg total) by mouth 2 (two) times daily. (Patient taking differently: Take 500 mg by mouth 2 (two) times daily as needed. ), Disp: 10 tablet, Rfl: 1    Past Medical History:   Diagnosis Date    Anxiety     Depression     Herpes genitalia        Past Surgical History:   Procedure Laterality Date     SECTION         Family History   Problem Relation Age of Onset    Depression Mother     Hypertension Mother     Depression Sister     Hypertension Paternal Grandmother     Diabetes Maternal Uncle     Breast cancer Neg Hx     Ovarian cancer Neg Hx        Social History     Socioeconomic History    Marital status: Single     Spouse name: Not on file    Number of children: Not on file    Years of education: Not on file    Highest education level: Not on file   Occupational History    Not on file   Social Needs    Financial resource strain: Not on file    Food insecurity     Worry: Not on file     Inability: Not on file    Transportation needs     Medical: Not on file     Non-medical: Not on file   Tobacco Use    Smoking status: Current Every Day Smoker     Packs/day: 1.00    Smokeless tobacco: Never Used   Substance and Sexual Activity    Alcohol use: Never     Frequency: Never    Drug use: Not Currently    Sexual activity: Yes     Partners: Male     Birth control/protection: None   Lifestyle    Physical activity     Days per week: Not on file     Minutes per session: Not on file    Stress: To some extent   Relationships    Social connections     Talks on phone: Not on file     Gets together:  Not on file     Attends Worship service: Not on file     Active member of club or organization: Not on file     Attends meetings of clubs or organizations: Not on file     Relationship status: Not on file   Other Topics Concern    Not on file   Social History Narrative    Not on file       Review of patient's allergies indicates:   Allergen Reactions    Penicillins Hives       Physical Exam:  There were no vitals filed for this visit.  General    Nursing note and vitals reviewed.  Constitutional: She is oriented to person, place, and time. She appears well-developed and well-nourished. No distress.   HENT:   Head: Normocephalic and atraumatic.   Nose: Nose normal.   Eyes: Conjunctivae and EOM are normal. Pupils are equal, round, and reactive to light. Right eye exhibits no discharge. Left eye exhibits no discharge. No scleral icterus.   Neck: No JVD present.   Cardiovascular: Intact distal pulses.    Pulmonary/Chest: Effort normal. No respiratory distress.   Abdominal: She exhibits no distension.   Neurological: She is alert and oriented to person, place, and time. Coordination normal.   Psychiatric: She has a normal mood and affect. Her behavior is normal. Judgment and thought content normal.     General Musculoskeletal Exam   Gait: normal     Back (L-Spine & T-Spine) / Neck (C-Spine) Exam     Tenderness Right paramedian tenderness of the Lower L-Spine. Left paramedian tenderness of the Lower L-Spine.     Back (L-Spine & T-Spine) Range of Motion   Back extension: facet loading is positive and exacerabtes/reproduces the patient's typical low back pain    Back flexion: limited ROM but partial relief of low back pain noted.     Spinal Sensation   Right Side Sensation  L-Spine Level: normal  Left Side Sensation  L-Spine Level: normal    Other She has no scoliosis .      Muscle Strength   Right Lower Extremity   Hip Flexion: 5/5   Hip Extensors: 5/5  Quadriceps:  5/5   Hamstrin/5   Gastrocsoleus:  5/5/5  Left  Lower Extremity   Hip Flexion: 5/5   Hip Extensors: 5/5  Quadriceps:  5/5   Hamstrin/5   Gastrocsoleus:  5/5/5    Reflexes     Left Side  Quadriceps:  2+  Achilles:  2+    Right Side   Quadriceps:  2+  Achilles:  2+      Imaging:  None    Labs:  BMP  Lab Results   Component Value Date     08/10/2020    K 4.3 08/10/2020     08/10/2020    CO2 26 08/10/2020    BUN 9 08/10/2020    CREATININE 0.8 08/10/2020    CALCIUM 9.3 08/10/2020    ANIONGAP 8 08/10/2020    ESTGFRAFRICA >60.0 08/10/2020    EGFRNONAA >60.0 08/10/2020     Lab Results   Component Value Date     (H) 08/10/2020    AST 55 (H) 08/10/2020    ALKPHOS 92 08/10/2020    BILITOT 0.2 08/10/2020       Assessment:  Problem List Items Addressed This Visit     Dorsalgia, unspecified    Relevant Orders    X-Ray Lumbar Spine Complete 5 View    Chronic bilateral low back pain without sciatica - Primary    Relevant Orders    Pregnancy, urine rapid    Chronic pain due to trauma          20 - Agnes Alonso is a 35 y.o. female with a history of substance abuse and addiction related to chronic low back pain.  She is presenting today complaining of chronic low back pain the onset of which is associated with a traumatic fall at age 17 years.  She reports suffering a compression fracture near the thoracolumbar junction, but the majority of her pain has been in the low back, between the hips.  Physical examination is most consistent with facet arthropathy at the lowest levels of the lumbar spine.  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  If the trauma was severe enough to produce compression, it is likely also severe enough to D grade the facet joints as well.  I have recommended a bilateral, diagnostic medial branch block.  Patient was advised of the interventional sequence which requires is to perform to diagnostic blocks prior to the radiofrequency ablation.  The risks and benefits of the  interventional sequence were discussed she would like to move forward.    : Reviewed and consistent with medication use as prescribed.    Treatment Plan:   Procedures: s/f bilateral MBB #1 of 2.  Plan for 2nd MBB and ultimately RFA, if appropriate.  PT/OT/HEP: Patient advised to perform core strengthening HEP in addition to stretching for maintenance of back health.  Medications:    - Cont Ibuprofen 600 mg TID PRN with Tylenol 1000 mg TID PRN together.   - Avoid opioids and other controlled substances dur to Hx of drug abuse  Labs: reviewed and medications are appropriately dosed for current hepatorenal function.  Imaging: X-ray lumbar spine    Follow Up: RTC 2-3 weeks    Lisbeth Noriega Jr, MD  Interventional Pain Medicine / Anesthesiology    Disclaimer: This note was partly generated using dictation software which may occasionally result in transcription errors.

## 2020-08-14 NOTE — H&P (VIEW-ONLY)
[facility] New Patient Evaluation    Referred by: Dr Medina Arellano  Reason for referral: Low back pain    CC:   Chief Complaint   Patient presents with    Low-back Pain     No flowsheet data found.    HPI:   Agnes Alonso is a 35 y.o. female who complains of low back pain which she attributes to a severe fall at age 17 yrs when she suffered considerable low back damage.  She reports having crushed vertebra near the thoracolumbar spine, though all of her pain is across the low back between the hips.  She reports a Hx of medication abuse and addiction and states a strong preference to avoid medications that might cause dependency or addiction.    Location: low back pain   Onset: at age 17 yrs  Current Pain Score: 3/10  Daily Pain of Range: 3-9/10  Quality: Aching, Dull, Burning, Throbbing, Tight and Deep  Radiation: mid-upper back area  Worsened by: running  Improved by: heat and massage    Previous Therapies:  PT/OT: Yes, completed 3x in the past 18 years.  Last in 2019.  HEP: Denies regular exercise beyond stretching.  Interventions: Denies  Surgery: Denies  Medications:   - NSAIDS:   - MSK Relaxants:   - TCAs:   - SNRIs:   - Topicals:   - Anticonvulsants:  - Opioids:     Current Pain Medications:  1. Ibuprofen 600 mg TID PRN - works well to eliminate/ prevent severe pain     Review of Systems:  Review of Systems   Constitutional: Negative for chills and fever.   HENT: Negative for nosebleeds.    Eyes: Negative for blurred vision.   Respiratory: Negative for hemoptysis.    Cardiovascular: Negative for chest pain and palpitations.   Gastrointestinal: Negative for heartburn and vomiting.   Genitourinary: Negative for hematuria.   Musculoskeletal: Positive for back pain. Negative for myalgias.   Skin: Negative for rash.   Neurological: Negative for tingling, seizures, loss of consciousness and weakness.   Endo/Heme/Allergies: Does not bruise/bleed easily.   Psychiatric/Behavioral: Positive  for depression. Negative for hallucinations.       History:    Current Outpatient Medications:     cyclobenzaprine (FLEXERIL) 10 MG tablet, Take 1 tablet (10 mg total) by mouth nightly. for 10 days, Disp: 30 tablet, Rfl: 0    ibuprofen (ADVIL,MOTRIN) 200 MG tablet, Take 200 mg by mouth every 6 (six) hours as needed for Pain., Disp: , Rfl:     valacyclovir (VALTREX) 500 MG tablet, Take 1 tablet (500 mg total) by mouth 2 (two) times daily. (Patient taking differently: Take 500 mg by mouth 2 (two) times daily as needed. ), Disp: 10 tablet, Rfl: 1    Past Medical History:   Diagnosis Date    Anxiety     Depression     Herpes genitalia        Past Surgical History:   Procedure Laterality Date     SECTION         Family History   Problem Relation Age of Onset    Depression Mother     Hypertension Mother     Depression Sister     Hypertension Paternal Grandmother     Diabetes Maternal Uncle     Breast cancer Neg Hx     Ovarian cancer Neg Hx        Social History     Socioeconomic History    Marital status: Single     Spouse name: Not on file    Number of children: Not on file    Years of education: Not on file    Highest education level: Not on file   Occupational History    Not on file   Social Needs    Financial resource strain: Not on file    Food insecurity     Worry: Not on file     Inability: Not on file    Transportation needs     Medical: Not on file     Non-medical: Not on file   Tobacco Use    Smoking status: Current Every Day Smoker     Packs/day: 1.00    Smokeless tobacco: Never Used   Substance and Sexual Activity    Alcohol use: Never     Frequency: Never    Drug use: Not Currently    Sexual activity: Yes     Partners: Male     Birth control/protection: None   Lifestyle    Physical activity     Days per week: Not on file     Minutes per session: Not on file    Stress: To some extent   Relationships    Social connections     Talks on phone: Not on file     Gets together:  Not on file     Attends Buddhism service: Not on file     Active member of club or organization: Not on file     Attends meetings of clubs or organizations: Not on file     Relationship status: Not on file   Other Topics Concern    Not on file   Social History Narrative    Not on file       Review of patient's allergies indicates:   Allergen Reactions    Penicillins Hives       Physical Exam:  There were no vitals filed for this visit.  General    Nursing note and vitals reviewed.  Constitutional: She is oriented to person, place, and time. She appears well-developed and well-nourished. No distress.   HENT:   Head: Normocephalic and atraumatic.   Nose: Nose normal.   Eyes: Conjunctivae and EOM are normal. Pupils are equal, round, and reactive to light. Right eye exhibits no discharge. Left eye exhibits no discharge. No scleral icterus.   Neck: No JVD present.   Cardiovascular: Intact distal pulses.    Pulmonary/Chest: Effort normal. No respiratory distress.   Abdominal: She exhibits no distension.   Neurological: She is alert and oriented to person, place, and time. Coordination normal.   Psychiatric: She has a normal mood and affect. Her behavior is normal. Judgment and thought content normal.     General Musculoskeletal Exam   Gait: normal     Back (L-Spine & T-Spine) / Neck (C-Spine) Exam     Tenderness Right paramedian tenderness of the Lower L-Spine. Left paramedian tenderness of the Lower L-Spine.     Back (L-Spine & T-Spine) Range of Motion   Back extension: facet loading is positive and exacerabtes/reproduces the patient's typical low back pain    Back flexion: limited ROM but partial relief of low back pain noted.     Spinal Sensation   Right Side Sensation  L-Spine Level: normal  Left Side Sensation  L-Spine Level: normal    Other She has no scoliosis .      Muscle Strength   Right Lower Extremity   Hip Flexion: 5/5   Hip Extensors: 5/5  Quadriceps:  5/5   Hamstrin/5   Gastrocsoleus:  5/5/5  Left  Lower Extremity   Hip Flexion: 5/5   Hip Extensors: 5/5  Quadriceps:  5/5   Hamstrin/5   Gastrocsoleus:  5/5/5    Reflexes     Left Side  Quadriceps:  2+  Achilles:  2+    Right Side   Quadriceps:  2+  Achilles:  2+      Imaging:  None    Labs:  BMP  Lab Results   Component Value Date     08/10/2020    K 4.3 08/10/2020     08/10/2020    CO2 26 08/10/2020    BUN 9 08/10/2020    CREATININE 0.8 08/10/2020    CALCIUM 9.3 08/10/2020    ANIONGAP 8 08/10/2020    ESTGFRAFRICA >60.0 08/10/2020    EGFRNONAA >60.0 08/10/2020     Lab Results   Component Value Date     (H) 08/10/2020    AST 55 (H) 08/10/2020    ALKPHOS 92 08/10/2020    BILITOT 0.2 08/10/2020       Assessment:  Problem List Items Addressed This Visit     Dorsalgia, unspecified    Relevant Orders    X-Ray Lumbar Spine Complete 5 View    Chronic bilateral low back pain without sciatica - Primary    Relevant Orders    Pregnancy, urine rapid    Chronic pain due to trauma          20 - Agnes Alonso is a 35 y.o. female with a history of substance abuse and addiction related to chronic low back pain.  She is presenting today complaining of chronic low back pain the onset of which is associated with a traumatic fall at age 17 years.  She reports suffering a compression fracture near the thoracolumbar junction, but the majority of her pain has been in the low back, between the hips.  Physical examination is most consistent with facet arthropathy at the lowest levels of the lumbar spine.  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  If the trauma was severe enough to produce compression, it is likely also severe enough to D grade the facet joints as well.  I have recommended a bilateral, diagnostic medial branch block.  Patient was advised of the interventional sequence which requires is to perform to diagnostic blocks prior to the radiofrequency ablation.  The risks and benefits of the  interventional sequence were discussed she would like to move forward.    : Reviewed and consistent with medication use as prescribed.    Treatment Plan:   Procedures: s/f bilateral MBB #1 of 2.  Plan for 2nd MBB and ultimately RFA, if appropriate.  PT/OT/HEP: Patient advised to perform core strengthening HEP in addition to stretching for maintenance of back health.  Medications:    - Cont Ibuprofen 600 mg TID PRN with Tylenol 1000 mg TID PRN together.   - Avoid opioids and other controlled substances dur to Hx of drug abuse  Labs: reviewed and medications are appropriately dosed for current hepatorenal function.  Imaging: X-ray lumbar spine    Follow Up: RTC 2-3 weeks    Lisbeth Noriega Jr, MD  Interventional Pain Medicine / Anesthesiology    Disclaimer: This note was partly generated using dictation software which may occasionally result in transcription errors.

## 2020-08-17 ENCOUNTER — OFFICE VISIT (OUTPATIENT)
Dept: PAIN MEDICINE | Facility: CLINIC | Age: 35
End: 2020-08-17
Payer: COMMERCIAL

## 2020-08-17 ENCOUNTER — HOSPITAL ENCOUNTER (OUTPATIENT)
Dept: RADIOLOGY | Facility: HOSPITAL | Age: 35
Discharge: HOME | End: 2020-08-17
Attending: PAIN MEDICINE
Payer: COMMERCIAL

## 2020-08-17 VITALS
OXYGEN SATURATION: 100 % | DIASTOLIC BLOOD PRESSURE: 70 MMHG | HEART RATE: 98 BPM | SYSTOLIC BLOOD PRESSURE: 120 MMHG | WEIGHT: 133.81 LBS | BODY MASS INDEX: 20.96 KG/M2

## 2020-08-17 DIAGNOSIS — M54.9 DORSALGIA, UNSPECIFIED: ICD-10-CM

## 2020-08-17 DIAGNOSIS — G89.29 CHRONIC BILATERAL LOW BACK PAIN WITHOUT SCIATICA: Primary | ICD-10-CM

## 2020-08-17 DIAGNOSIS — G89.21 CHRONIC PAIN DUE TO TRAUMA: ICD-10-CM

## 2020-08-17 DIAGNOSIS — M54.50 CHRONIC BILATERAL LOW BACK PAIN WITHOUT SCIATICA: Primary | ICD-10-CM

## 2020-08-17 LAB
HCV GENTYP SERPL NAA+PROBE: ABNORMAL
HCV RNA SERPL NAA+PROBE-LOG IU: 5.19 LOG (10) IU/ML
HCV RNA SERPL QL NAA+PROBE: DETECTED
HCV RNA SPEC NAA+PROBE-ACNC: ABNORMAL IU/ML

## 2020-08-17 PROCEDURE — 99999 PR PBB SHADOW E&M-EST. PATIENT-LVL III: ICD-10-PCS | Mod: PBBFAC,,, | Performed by: PAIN MEDICINE

## 2020-08-17 PROCEDURE — 72110 XR LUMBAR SPINE COMPLETE 5 VIEW: ICD-10-PCS | Mod: 26,,, | Performed by: RADIOLOGY

## 2020-08-17 PROCEDURE — 72110 X-RAY EXAM L-2 SPINE 4/>VWS: CPT | Mod: TC,FY

## 2020-08-17 PROCEDURE — 99244 PR OFFICE CONSULTATION,LEVEL IV: ICD-10-PCS | Mod: S$GLB,,, | Performed by: PAIN MEDICINE

## 2020-08-17 PROCEDURE — 99244 OFF/OP CNSLTJ NEW/EST MOD 40: CPT | Mod: S$GLB,,, | Performed by: PAIN MEDICINE

## 2020-08-17 PROCEDURE — 72110 X-RAY EXAM L-2 SPINE 4/>VWS: CPT | Mod: 26,,, | Performed by: RADIOLOGY

## 2020-08-17 PROCEDURE — 99999 PR PBB SHADOW E&M-EST. PATIENT-LVL III: CPT | Mod: PBBFAC,,, | Performed by: PAIN MEDICINE

## 2020-08-17 NOTE — LETTER
August 17, 2020      eMdina Arellano MD  [address] [facility] [address]           [place] [facility]  [address]  [place] [address]  Phone: [phone]          Patient: Agnes Alonso   MR Number: [id number]   YOB: 1985   Date of Visit: 8/17/2020       Dear Dr. Medina Arellano:    Thank you for referring Agnes Alonso to me for evaluation. Attached you will find relevant portions of my assessment and plan of care.    If you have questions, please do not hesitate to call me. I look forward to following Agnes Alonso along with you.    Sincerely,    Lisbeth Noriega Jr., MD    Enclosure  CC:  No Recipients    If you would like to receive this communication electronically, please contact [email or web address] or [phone] to request more information on [employer] Link access.    For providers and/or their staff who would like to refer a patient to [facility], please contact us through our one-stop-shop provider referral line, [facility], at [phone].    If you feel you have received this communication in error or would no longer like to receive these types of communications, please e-mail [email or web address]

## 2020-08-18 ENCOUNTER — PATIENT MESSAGE (OUTPATIENT)
Dept: PRIMARY CARE CLINIC | Facility: CLINIC | Age: 35
End: 2020-08-18

## 2020-08-18 ENCOUNTER — TELEPHONE (OUTPATIENT)
Dept: PRIMARY CARE CLINIC | Facility: CLINIC | Age: 35
End: 2020-08-18

## 2020-08-18 ENCOUNTER — TELEPHONE (OUTPATIENT)
Dept: TRANSPLANT | Facility: CLINIC | Age: 35
End: 2020-08-18

## 2020-08-18 ENCOUNTER — TELEPHONE (OUTPATIENT)
Dept: PAIN MEDICINE | Facility: CLINIC | Age: 35
End: 2020-08-18

## 2020-08-18 DIAGNOSIS — M47.816 LUMBAR SPONDYLOSIS: Primary | ICD-10-CM

## 2020-08-18 NOTE — TELEPHONE ENCOUNTER
----- Message from Medina Arellano MD sent at 8/18/2020  8:35 AM CDT -----  Please let patient know hepatology referral and liver ultrasound ordered.    Please see hepatology re: Hepatitis C.    A liver Ultrasound was ordered preemptively.

## 2020-08-18 NOTE — TELEPHONE ENCOUNTER
Pt scheduled for 8/25/20 at 1115 am for Bilateral Lumbar MBB. Pt aware to check in at registration desk on the first floor of the hospital for 1015 am. Pt denied taking blood thinners and is not diabetic. Pt denied having a pacemaker/ICD.

## 2020-08-18 NOTE — TELEPHONE ENCOUNTER
----- Message from Robbin Dey sent at 8/18/2020 10:13 AM CDT -----    Pt chart sent to nurse for review.     .       ----- Message -----  From: Shanique Rosenbaum  Sent: 8/18/2020  10:05 AM CDT  To: [facility] Liver Referral Pool    Dr.Tenille Arellano has placed a referral for the patient to be seen with Hepatology. Please assist with scheduling.    Hepatitis C virus infection without hepatic coma, unspecified chronicity

## 2020-08-19 ENCOUNTER — DOCUMENTATION ONLY (OUTPATIENT)
Dept: TRANSPLANT | Facility: CLINIC | Age: 35
End: 2020-08-19

## 2020-08-19 ENCOUNTER — TELEPHONE (OUTPATIENT)
Dept: HEPATOLOGY | Facility: CLINIC | Age: 35
End: 2020-08-19

## 2020-08-19 NOTE — TELEPHONE ENCOUNTER
----- Message from Balbina Leung sent at 8/19/2020  9:17 AM CDT -----    ----- Message -----  From: Fallon Lemon LPN  Sent: 8/19/2020   7:53 AM CDT  To: Balbina Dillon RN, #      Labs already done        Pt records reviewed.   Pt will be referred to Hepatitis C clinic  Hep C  Initial referral received  from the workque.   Referring Provider/diagnosis  Medina Arellano MD

## 2020-08-19 NOTE — NURSING
Pt records reviewed.   Pt will be referred to Hepatitis C clinic  Hep C  Initial referral received  from the [facility]que.   Referring Provider/diagnosis  Medina Arellano MD    Referral letter sent to  patient.

## 2020-08-19 NOTE — LETTER
August 19, 2020    Agnes Alonso  [address] [facility] [address]      Dear Agnes Alonso:    Your doctor has referred you to the [facility]. We are sending this letter to remind you to make an appointment with us to complete the referral process.     Please call us at [phone] to schedule an appointment. We look forward to seeing you soon.     If you received a call and have been scheduled, please disregard this letter.       Sincerely,        [facility]   [address] [place] [address] [phone]

## 2020-08-20 LAB
HPV HR 12 DNA SPEC QL NAA+PROBE: POSITIVE
HPV16 AG SPEC QL: NEGATIVE
HPV18 DNA SPEC QL NAA+PROBE: NEGATIVE

## 2020-08-24 ENCOUNTER — HOSPITAL ENCOUNTER (OUTPATIENT)
Dept: RADIOLOGY | Facility: OTHER | Age: 35
Discharge: HOME | End: 2020-08-24
Attending: FAMILY MEDICINE
Payer: COMMERCIAL

## 2020-08-24 ENCOUNTER — TELEPHONE (OUTPATIENT)
Dept: PRIMARY CARE CLINIC | Facility: CLINIC | Age: 35
End: 2020-08-24

## 2020-08-24 DIAGNOSIS — B19.20 HEPATITIS C VIRUS INFECTION WITHOUT HEPATIC COMA, UNSPECIFIED CHRONICITY: ICD-10-CM

## 2020-08-24 PROCEDURE — 76705 US LIVER WITH DOPPLER: ICD-10-PCS | Mod: 26,59,, | Performed by: RADIOLOGY

## 2020-08-24 PROCEDURE — 93975 VASCULAR STUDY: CPT | Mod: 26,,, | Performed by: RADIOLOGY

## 2020-08-24 PROCEDURE — 76705 ECHO EXAM OF ABDOMEN: CPT | Mod: TC,59

## 2020-08-24 PROCEDURE — 76705 ECHO EXAM OF ABDOMEN: CPT | Mod: 26,59,, | Performed by: RADIOLOGY

## 2020-08-24 PROCEDURE — 93975 US LIVER WITH DOPPLER: ICD-10-PCS | Mod: 26,,, | Performed by: RADIOLOGY

## 2020-08-24 PROCEDURE — 93975 VASCULAR STUDY: CPT | Mod: TC

## 2020-08-24 NOTE — TELEPHONE ENCOUNTER
----- Message from Medina Arellano MD sent at 8/24/2020  1:42 PM CDT -----  No acute concerns on liver ultrasound. Please let patient know result sent to portal. She will keep hepatology appointment on 8/28/2020.

## 2020-08-24 NOTE — DISCHARGE INSTRUCTIONS
Home Care Instructions Pain Management:    1.  DIET:    You may resume your normal diet today.    2.  BATHING:    You may shower with luke warm water.    3.  DRESSING:    You may remove your bandage today.    4.  ACTIVITY LEVEL:      You may resume your normal activities 24 hours after your procedure.    5.  MEDICATIONS:    You may resume your normal medications today.    6.  SPECIAL INSTRUCTIONS:    No heat to the injection site for 24 hours including bath or shower, heating pad, moist heat or hot tubs.    Use an ice pack to the injection site for any pain or discomfort.  Apply ice packs for 20 minute intervals as needed.    If you have received any sedatives by mouth today, you can not drive for 12 hours.    If you have received sedation through an IV, you can not drive for 24 hours.    PLEASE CALL YOUR DOCTOR FOR THE FOLLOWIN.  Redness or swelling around the injection site.  2.  Fever of 101 degrees.  3.  Drainage (pus) from the injection site.  4.  For any continuous bleeding (some dried blood over the incision is normal.)    FOR EMERGENCIES:    If any unusual problems or difficulties occur during clinic hours, call [phone] or dial [phone].    Follow up with with your physician in 2-3 weeks.

## 2020-08-25 ENCOUNTER — HOSPITAL ENCOUNTER (OUTPATIENT)
Facility: HOSPITAL | Age: 35
Discharge: HOME | End: 2020-08-25
Attending: PAIN MEDICINE | Admitting: PAIN MEDICINE
Payer: COMMERCIAL

## 2020-08-25 VITALS
TEMPERATURE: 98 F | OXYGEN SATURATION: 98 % | HEART RATE: 105 BPM | RESPIRATION RATE: 15 BRPM | WEIGHT: 135 LBS | SYSTOLIC BLOOD PRESSURE: 117 MMHG | DIASTOLIC BLOOD PRESSURE: 76 MMHG | HEIGHT: 65 IN | BODY MASS INDEX: 22.49 KG/M2

## 2020-08-25 DIAGNOSIS — G89.29 CHRONIC PAIN: ICD-10-CM

## 2020-08-25 DIAGNOSIS — M47.816 LUMBAR SPONDYLOSIS: Primary | ICD-10-CM

## 2020-08-25 LAB
FINAL PATHOLOGIC DIAGNOSIS: NORMAL
Lab: NORMAL

## 2020-08-25 PROCEDURE — 25500020 PHARM REV CODE 255: Performed by: PAIN MEDICINE

## 2020-08-25 PROCEDURE — 64494 INJ PARAVERT F JNT L/S 2 LEV: CPT | Mod: 50,,, | Performed by: PAIN MEDICINE

## 2020-08-25 PROCEDURE — 99152 MOD SED SAME PHYS/QHP 5/>YRS: CPT | Performed by: PAIN MEDICINE

## 2020-08-25 PROCEDURE — 64493 INJ PARAVERT F JNT L/S 1 LEV: CPT | Mod: 50 | Performed by: PAIN MEDICINE

## 2020-08-25 PROCEDURE — 64494 INJ PARAVERT F JNT L/S 2 LEV: CPT | Mod: 50 | Performed by: PAIN MEDICINE

## 2020-08-25 PROCEDURE — 25000003 PHARM REV CODE 250: Performed by: PAIN MEDICINE

## 2020-08-25 PROCEDURE — 63600175 PHARM REV CODE 636 W HCPCS: Performed by: PAIN MEDICINE

## 2020-08-25 PROCEDURE — 64494 PR INJ DX/THER AGNT PARAVERT FACET JOINT,IMG GUIDE,LUMBAR/SAC, 2ND LEVEL: ICD-10-PCS | Mod: 50,,, | Performed by: PAIN MEDICINE

## 2020-08-25 PROCEDURE — 64493 INJ PARAVERT F JNT L/S 1 LEV: CPT | Mod: 50,,, | Performed by: PAIN MEDICINE

## 2020-08-25 PROCEDURE — 64493 PR INJ DX/THER AGNT PARAVERT FACET JOINT,IMG GUIDE,LUMBAR/SAC,1ST LVL: ICD-10-PCS | Mod: 50,,, | Performed by: PAIN MEDICINE

## 2020-08-25 RX ORDER — SODIUM CHLORIDE 9 MG/ML
500 INJECTION, SOLUTION INTRAVENOUS CONTINUOUS
Status: DISCONTINUED | OUTPATIENT
Start: 2020-08-25 | End: 2021-03-29

## 2020-08-25 RX ORDER — FENTANYL CITRATE 50 UG/ML
INJECTION, SOLUTION INTRAMUSCULAR; INTRAVENOUS
Status: DISCONTINUED | OUTPATIENT
Start: 2020-08-25 | End: 2020-08-25 | Stop reason: HOSPADM

## 2020-08-25 RX ORDER — BUPIVACAINE HYDROCHLORIDE 2.5 MG/ML
INJECTION, SOLUTION EPIDURAL; INFILTRATION; INTRACAUDAL
Status: DISCONTINUED | OUTPATIENT
Start: 2020-08-25 | End: 2020-08-25 | Stop reason: HOSPADM

## 2020-08-25 RX ORDER — MIDAZOLAM HYDROCHLORIDE 1 MG/ML
INJECTION, SOLUTION INTRAMUSCULAR; INTRAVENOUS
Status: DISCONTINUED | OUTPATIENT
Start: 2020-08-25 | End: 2020-08-25 | Stop reason: HOSPADM

## 2020-08-25 RX ORDER — LIDOCAINE HYDROCHLORIDE 10 MG/ML
INJECTION INFILTRATION; PERINEURAL
Status: DISCONTINUED | OUTPATIENT
Start: 2020-08-25 | End: 2020-08-25 | Stop reason: HOSPADM

## 2020-08-25 RX ORDER — SODIUM BICARBONATE 1 MEQ/ML
SYRINGE (ML) INTRAVENOUS
Status: DISCONTINUED | OUTPATIENT
Start: 2020-08-25 | End: 2020-08-25 | Stop reason: HOSPADM

## 2020-08-25 RX ORDER — LIDOCAINE HYDROCHLORIDE 10 MG/ML
1 INJECTION, SOLUTION EPIDURAL; INFILTRATION; INTRACAUDAL; PERINEURAL ONCE
Status: DISCONTINUED | OUTPATIENT
Start: 2020-08-25 | End: 2021-03-29

## 2020-08-25 NOTE — PROGRESS NOTES
PAtients vital signs normal. IV removed. Pain diary and D/C instruction reviewed with patient. Patient verbalized understanding.

## 2020-08-25 NOTE — INTERVAL H&P NOTE
No significant changes were noted from the H&P or last clinic note.    The risks and benefits of this intervention, and alternative therapies were discussed with the patient.  The discussion of risks included infection, bleeding, need for additional procedures or surgery, nerve damage, paralysis, adverse medication reaction(s), stroke, and/or death.  Questions regarding the procedure, risks, expected outcome, and possible side effects were solicited and answered to the patient's satisfaction.  Agnes Alonso wishes to proceed with the injection or procedure.  Written consent was obtained.    Lisbeth Noriega Jr, MD  Interventional Pain Medicine / Anesthesiology

## 2020-08-25 NOTE — PROGRESS NOTES
MD Arrival Time:1127  Sedation Start Time:1127  Sedation End Time:1136  MD Departure Time:1136

## 2020-08-25 NOTE — DISCHARGE SUMMARY
[facility]  Discharge Note  Short Stay     Admit Date: 8/25/2020    Discharge Date: 8/25/2020     Attending Physician: Lisbeth Noriega Jr, MD    Diagnoses:  Active Hospital Problems    Diagnosis  POA    Chronic pain [G89.29]  Yes      Resolved Hospital Problems   No resolved problems to display.     Discharged Condition: Good     Hospital Course: Patient was admitted for an outpatient interventional pain management procedure and tolerated the procedure well with no complications.     Final Diagnoses: Same as principal problem.     Disposition: Home or Self Care     Follow up/Patient Instructions:    Follow-up Information     Lisbeth Noriega Jr, MD. Go in 2 days.    Specialty: Pain Medicine  Why: Post-procedural Follow Up As Scheduled, Call to make an appointment if you do not have one, To Report Your Pain Scores from the Pain Diary  Contact information:  [address]  [address]  [place] [address] [phone]                   Reconciled Medications:     Medication List      CHANGE how you take these medications    valACYclovir 500 MG tablet  Commonly known as: VALTREX  Take 1 tablet (500 mg total) by mouth 2 (two) times daily.  What changed:   · when to take this  · reasons to take this        CONTINUE taking these medications    ibuprofen 200 MG tablet  Commonly known as: ADVIL,MOTRIN  Take 200 mg by mouth every 6 (six) hours as needed for Pain.           Discharge Procedure Orders (must include Diet, Follow-up, Activity)   Call MD for:  temperature >100.4     Call MD for:  severe uncontrolled pain     Call MD for:  redness, tenderness, or signs of infection (pain, swelling, redness, odor or green/yellow discharge around incision site)     Call MD for:  difficulty breathing or increased cough     Call MD for:  severe persistent headache     Call MD for:  worsening rash     Remove dressing in 24 hours       Lisbeth Noriega Jr, MD  Interventional Pain Medicine / Anesthesiology

## 2020-08-25 NOTE — OP NOTE
"Procedure Note    Pre-operative diagnosis: Lumbar Spondylosis  Post-operative diagnosis: Lumbar Spondylosis  Procedure Date: 08/25/2020  Procedure:  (1) Bilateral L4-5 and L5-S1 Lumbar Medial Branch Block     (2) Fluoroscopy for needle guidance    (3) IV Moderate Sedation (Midazolam 2 mg, Fentanyl 50 mcg)    Procedure in detail:  Informed consent obtained after explaining the procedure and potential complications including local discomfort, infection, headache, temporary or permanent weakness and/or numbness of one or both legs, temporary or permanent paraplegia, heart attack and stroke. All questions were answered.      Patient was taken to the procedure room and placed in prone position.  Time out was performed.  Patient's Lumbar area was prepped and draped in a sterile manner.  AP and oblique fluoroscopy were used to identify and jessica the junctions between the superior articular processes and transverse processes at the L4 and L5 vertebral levels as well as the sacral ala on the Right side.  Skin and tissues overlying the targeted points were anesthetized with Lidocaine 1% (10 mL) + sodium bicarbonate (1 mL) using a 25G 1.25" needle.  Then, under fluoroscopic guidance, a 22 G 3.5 inch spinal needle, was advanced through the anesthetized tissues toward the targeted points corresponding to the locations of the L3,4,5 medial branch nerves.     After heme-negative aspiration, up to 1 mL of contrast was divided among the levels and administered through each needle demonstrating local accumulation.  Next, 1 mL of 0.25% bupivacaine was injected at each of the above targeted points: the locations of the L3,4,5 medial branch nerves.     The procedure was repeated on the Left side with similar findings.  Needle placement and contrast dispersion were consistent with successful medial branch nerve block.    Needle was removed with flush and bandaged placed over site of needle insertion.      Estimated Blood Loss: " None    Disposition: The patient tolerated the procedure without complaint and was transported to the recovery room in stable condition.    Follow-up: Return for second MBB or RFA if appropriate      Lisbeth Noriega Jr, MD  Interventional Pain Medicine / Anesthesiology

## 2020-08-27 ENCOUNTER — OFFICE VISIT (OUTPATIENT)
Dept: PAIN MEDICINE | Facility: CLINIC | Age: 35
End: 2020-08-27
Payer: COMMERCIAL

## 2020-08-27 DIAGNOSIS — M54.9 DORSALGIA, UNSPECIFIED: ICD-10-CM

## 2020-08-27 DIAGNOSIS — G89.21 CHRONIC PAIN DUE TO TRAUMA: ICD-10-CM

## 2020-08-27 DIAGNOSIS — M47.816 LUMBAR SPONDYLOSIS: Primary | ICD-10-CM

## 2020-08-27 DIAGNOSIS — M54.50 CHRONIC BILATERAL LOW BACK PAIN WITHOUT SCIATICA: ICD-10-CM

## 2020-08-27 DIAGNOSIS — G89.29 CHRONIC BILATERAL LOW BACK PAIN WITHOUT SCIATICA: ICD-10-CM

## 2020-08-27 PROCEDURE — 99213 OFFICE O/P EST LOW 20 MIN: CPT | Mod: 95,,, | Performed by: NURSE PRACTITIONER

## 2020-08-27 PROCEDURE — 99213 PR OFFICE/OUTPT VISIT, EST, LEVL III, 20-29 MIN: ICD-10-PCS | Mod: 95,,, | Performed by: NURSE PRACTITIONER

## 2020-08-27 NOTE — H&P (VIEW-ONLY)
[facility]  Patient Evaluation  telemedicine Encounter    Telemedicine Bundle:  This visit was completed during the Coronavirus Crisis to enhance patient safety.  The patient location is: patient's home  The chief complaint leading to consultation is: Low back pain   Visit type: Virtual visit with synchronous audio and video  Total time spent with patient: 15 minutes   Each patient to whom he or she provides medical services by telemedicine is:    (1) informed of the relationship between the physician and patient and the respective role of any other health care provider with respect to management of the patient  (2) notified that he or she may decline to receive medical services by telemedicine and may withdraw from such care at any time.      Referred by: Dr Medina Arellano  Reason for referral: Low back pain    CC:   Low back pain      HPI:   Agnes Alonso is a 35 y.o. female who complains of low back pain which she attributes to a severe fall at age 17 yrs when she suffered considerable low back damage.  She reports having crushed vertebra near the thoracolumbar spine, though all of her pain is across the low back between the hips.  She reports a Hx of medication abuse and addiction and states a strong preference to avoid medications that might cause dependency or addiction.      Interval updates    8-- 35-year-old female presents status post #1 of 2  bilateral lumbar MBB targeting L4-5 and L5-S1 reporting 90% relief for 24 hours and then pain returned. Patient reports improved functionality.     Location: low back pain   Onset: at age 17 yrs  Current Pain Score: 3/10  Daily Pain of Range: 3-9/10  Quality: Aching, Dull, Burning, Throbbing, Tight and Deep  Radiation: mid-upper back area  Worsened by: running  Improved by: heat and massage    Previous Therapies:  PT/OT: Yes, completed 3x in the past 18 years.  Last in 2019.  HEP: Denies regular exercise beyond  stretching.  Interventions: Denies  Surgery: Denies  Medications:   - NSAIDS:   - MSK Relaxants:   - TCAs:   - SNRIs:   - Topicals:   - Anticonvulsants:  - Opioids:     Current Pain Medications:  1. Ibuprofen 600 mg TID PRN - works well to eliminate/ prevent severe pain     Review of Systems:  Review of Systems   Constitutional: Negative for chills and fever.   HENT: Negative for nosebleeds.    Eyes: Negative for blurred vision.   Respiratory: Negative for hemoptysis.    Cardiovascular: Negative for chest pain and palpitations.   Gastrointestinal: Negative for heartburn and vomiting.   Genitourinary: Negative for hematuria.   Musculoskeletal: Positive for back pain. Negative for myalgias.   Skin: Negative for rash.   Neurological: Negative for tingling, seizures, loss of consciousness and weakness.   Endo/Heme/Allergies: Does not bruise/bleed easily.   Psychiatric/Behavioral: Positive for depression. Negative for hallucinations.       History:    Current Outpatient Medications:     ibuprofen (ADVIL,MOTRIN) 200 MG tablet, Take 200 mg by mouth every 6 (six) hours as needed for Pain., Disp: , Rfl:     valacyclovir (VALTREX) 500 MG tablet, Take 1 tablet (500 mg total) by mouth 2 (two) times daily. (Patient taking differently: Take 500 mg by mouth 2 (two) times daily as needed. ), Disp: 10 tablet, Rfl: 1  No current facility-administered medications for this visit.     Facility-Administered Medications Ordered in Other Visits:     0.9%  NaCl infusion, 500 mL, Intravenous, Continuous, Lisbeth Noriega Jr., MD    lidocaine (PF) 10 mg/ml (1%) injection 10 mg, 1 mL, Intradermal, Once, Lisbeth Noriega Jr., MD    Past Medical History:   Diagnosis Date    Anxiety     Depression     Herpes genitalia        Past Surgical History:   Procedure Laterality Date     SECTION      INJECTION OF ANESTHETIC AGENT AROUND MEDIAL BRANCH NERVES INNERVATING LUMBAR FACET JOINT Bilateral 2020    Procedure:  Block-nerve-medial branch-lumbar--Bilateral L4-5, L5-S1;  Surgeon: Lisbeth Noriega Jr., MD;  Location: [facility] [place];  Service: Pain Management;  Laterality: Bilateral;       Family History   Problem Relation Age of Onset    Depression Mother     Hypertension Mother     Depression Sister     Hypertension Paternal Grandmother     Diabetes Maternal Uncle     Breast cancer Neg Hx     Ovarian cancer Neg Hx        Social History     Socioeconomic History    Marital status: Single     Spouse name: Not on file    Number of children: Not on file    Years of education: Not on file    Highest education level: Not on file   Occupational History    Not on file   Social Needs    Financial resource strain: Not on file    Food insecurity     Worry: Not on file     Inability: Not on file    Transportation needs     Medical: Not on file     Non-medical: Not on file   Tobacco Use    Smoking status: Current Every Day Smoker     Packs/day: 1.00    Smokeless tobacco: Never Used   Substance and Sexual Activity    Alcohol use: Never     Frequency: Never    Drug use: Not Currently    Sexual activity: Yes     Partners: Male     Birth control/protection: None   Lifestyle    Physical activity     Days per week: Not on file     Minutes per session: Not on file    Stress: To some extent   Relationships    Social connections     Talks on phone: Not on file     Gets together: Not on file     Attends Religion service: Not on file     Active member of club or organization: Not on file     Attends meetings of clubs or organizations: Not on file     Relationship status: Not on file   Other Topics Concern    Not on file   Social History Narrative    Not on file       Review of patient's allergies indicates:   Allergen Reactions    Penicillins Hives       Physical Exam:  There were no vitals filed for this visit.  There was no PE done for this virtual visit.     Imaging:  None    Labs:  BMP  Lab Results   Component Value  Date     08/10/2020    K 4.3 08/10/2020     08/10/2020    CO2 26 08/10/2020    BUN 9 08/10/2020    CREATININE 0.8 08/10/2020    CALCIUM 9.3 08/10/2020    ANIONGAP 8 08/10/2020    ESTGFRAFRICA >60.0 08/10/2020    EGFRNONAA >60.0 08/10/2020     Lab Results   Component Value Date     (H) 08/10/2020    AST 55 (H) 08/10/2020    ALKPHOS 92 08/10/2020    BILITOT 0.2 08/10/2020       Assessment:  Problem List Items Addressed This Visit        Orthopedic    Dorsalgia, unspecified    Chronic bilateral low back pain without sciatica       Other    Chronic pain due to trauma      Other Visit Diagnoses     Lumbar spondylosis    -  Primary          8/17/20 - Agnes Alonso is a 35 y.o. female with a history of substance abuse and addiction related to chronic low back pain.  She is presenting today complaining of chronic low back pain the onset of which is associated with a traumatic fall at age 17 years.  She reports suffering a compression fracture near the thoracolumbar junction, but the majority of her pain has been in the low back, between the hips.  Physical examination is most consistent with facet arthropathy at the lowest levels of the lumbar spine.  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  If the trauma was severe enough to produce compression, it is likely also severe enough to D grade the facet joints as well.  I have recommended a bilateral, diagnostic medial branch block.  Patient was advised of the interventional sequence which requires is to perform to diagnostic blocks prior to the radiofrequency ablation.  The risks and benefits of the interventional sequence were discussed she would like to move forward.    8/27/2020- 35-year-old female presents status post # 1 of 2  bilateral lumbar MBB targeting L4-5 and L5-S1 she is reporting 90% relief for 24 hr and pain slowly returned she reports improved functionality which allowed her to perform her ADLs.      : Reviewed and consistent with medication use as prescribed.    Treatment Plan:   Procedures: s/f bilateral MBB #2 of 2.  Plan for 2nd Lumbar MBB targeting L4-5 and L5-S1 and ultimately RFA, if appropriate.  PT/OT/HEP: Patient advised to perform core strengthening HEP in addition to stretching for maintenance of back health.  Medications:    - Cont Ibuprofen 600 mg TID PRN with Tylenol 1000 mg TID PRN together.   - Avoid opioids and other controlled substances dur to Hx of drug abuse  Labs: reviewed and medications are appropriately dosed for current hepatorenal function.  Imaging:  Previous imaging was reviewed and discussed with the patient today.   Follow Up: RTC 2-3 weeks virtually or in person.     Santana Stone, EFRAIN-C  Interventional Pain Management      Disclaimer: This note was partly generated using dictation software which may occasionally result in transcription errors.

## 2020-08-27 NOTE — PROGRESS NOTES
[facility]  Patient Evaluation  telemedicine Encounter    Telemedicine Bundle:  This visit was completed during the Coronavirus Crisis to enhance patient safety.  The patient location is: patient's home  The chief complaint leading to consultation is: Low back pain   Visit type: Virtual visit with synchronous audio and video  Total time spent with patient: 15 minutes   Each patient to whom he or she provides medical services by telemedicine is:    (1) informed of the relationship between the physician and patient and the respective role of any other health care provider with respect to management of the patient  (2) notified that he or she may decline to receive medical services by telemedicine and may withdraw from such care at any time.      Referred by: Dr Medina Arellano  Reason for referral: Low back pain    CC:   Low back pain      HPI:   Agnes lAonso is a 35 y.o. female who complains of low back pain which she attributes to a severe fall at age 17 yrs when she suffered considerable low back damage.  She reports having crushed vertebra near the thoracolumbar spine, though all of her pain is across the low back between the hips.  She reports a Hx of medication abuse and addiction and states a strong preference to avoid medications that might cause dependency or addiction.      Interval updates    8-- 35-year-old female presents status post #1 of 2  bilateral lumbar MBB targeting L4-5 and L5-S1 reporting 90% relief for 24 hours and then pain returned. Patient reports improved functionality.     Location: low back pain   Onset: at age 17 yrs  Current Pain Score: 3/10  Daily Pain of Range: 3-9/10  Quality: Aching, Dull, Burning, Throbbing, Tight and Deep  Radiation: mid-upper back area  Worsened by: running  Improved by: heat and massage    Previous Therapies:  PT/OT: Yes, completed 3x in the past 18 years.  Last in 2019.  HEP: Denies regular exercise beyond  stretching.  Interventions: Denies  Surgery: Denies  Medications:   - NSAIDS:   - MSK Relaxants:   - TCAs:   - SNRIs:   - Topicals:   - Anticonvulsants:  - Opioids:     Current Pain Medications:  1. Ibuprofen 600 mg TID PRN - works well to eliminate/ prevent severe pain     Review of Systems:  Review of Systems   Constitutional: Negative for chills and fever.   HENT: Negative for nosebleeds.    Eyes: Negative for blurred vision.   Respiratory: Negative for hemoptysis.    Cardiovascular: Negative for chest pain and palpitations.   Gastrointestinal: Negative for heartburn and vomiting.   Genitourinary: Negative for hematuria.   Musculoskeletal: Positive for back pain. Negative for myalgias.   Skin: Negative for rash.   Neurological: Negative for tingling, seizures, loss of consciousness and weakness.   Endo/Heme/Allergies: Does not bruise/bleed easily.   Psychiatric/Behavioral: Positive for depression. Negative for hallucinations.       History:    Current Outpatient Medications:     ibuprofen (ADVIL,MOTRIN) 200 MG tablet, Take 200 mg by mouth every 6 (six) hours as needed for Pain., Disp: , Rfl:     valacyclovir (VALTREX) 500 MG tablet, Take 1 tablet (500 mg total) by mouth 2 (two) times daily. (Patient taking differently: Take 500 mg by mouth 2 (two) times daily as needed. ), Disp: 10 tablet, Rfl: 1  No current facility-administered medications for this visit.     Facility-Administered Medications Ordered in Other Visits:     0.9%  NaCl infusion, 500 mL, Intravenous, Continuous, Lisbeth Noriega Jr., MD    lidocaine (PF) 10 mg/ml (1%) injection 10 mg, 1 mL, Intradermal, Once, Lisbeth Noriega Jr., MD    Past Medical History:   Diagnosis Date    Anxiety     Depression     Herpes genitalia        Past Surgical History:   Procedure Laterality Date     SECTION      INJECTION OF ANESTHETIC AGENT AROUND MEDIAL BRANCH NERVES INNERVATING LUMBAR FACET JOINT Bilateral 2020    Procedure:  Block-nerve-medial branch-lumbar--Bilateral L4-5, L5-S1;  Surgeon: Lisbeth Noriega Jr., MD;  [place] [facility] [place] [facility] [place] [facility] [place]  Service: Pain Management;  Laterality: Bilateral;       Family History   Problem Relation Age of Onset    Depression Mother     Hypertension Mother     Depression Sister     Hypertension Paternal Grandmother     Diabetes Maternal Uncle     Breast cancer Neg Hx     Ovarian cancer Neg Hx        Social History     Socioeconomic History    Marital status: Single     Spouse name: Not on file    Number of children: Not on file    Years of education: Not on file    Highest education level: Not on file   Occupational History    Not on file   Social Needs    Financial resource strain: Not on file    Food insecurity     Worry: Not on file     Inability: Not on file    Transportation needs     Medical: Not on file     Non-medical: Not on file   Tobacco Use    Smoking status: Current Every Day Smoker     Packs/day: 1.00    Smokeless tobacco: Never Used   Substance and Sexual Activity    Alcohol use: Never     Frequency: Never    Drug use: Not Currently    Sexual activity: Yes     Partners: Male     Birth control/protection: None   Lifestyle    Physical activity     Days per week: Not on file     Minutes per session: Not on file    Stress: To some extent   Relationships    Social connections     Talks on phone: Not on file     Gets together: Not on file     Attends Adventism service: Not on file     Active member of club or organization: Not on file     Attends meetings of clubs or organizations: Not on file     Relationship status: Not on file   Other Topics Concern    Not on file   Social History Narrative    Not on file       Review of patient's allergies indicates:   Allergen Reactions    Penicillins Hives       Physical Exam:  There were no vitals filed for this visit.  There was no PE done for this virtual visit.     Imaging:  None    Labs:  BMP  Lab Results   Component Value  Date     08/10/2020    K 4.3 08/10/2020     08/10/2020    CO2 26 08/10/2020    BUN 9 08/10/2020    CREATININE 0.8 08/10/2020    CALCIUM 9.3 08/10/2020    ANIONGAP 8 08/10/2020    ESTGFRAFRICA >60.0 08/10/2020    EGFRNONAA >60.0 08/10/2020     Lab Results   Component Value Date     (H) 08/10/2020    AST 55 (H) 08/10/2020    ALKPHOS 92 08/10/2020    BILITOT 0.2 08/10/2020       Assessment:  Problem List Items Addressed This Visit        Orthopedic    Dorsalgia, unspecified    Chronic bilateral low back pain without sciatica       Other    Chronic pain due to trauma      Other Visit Diagnoses     Lumbar spondylosis    -  Primary          8/17/20 - Agnes Alonso is a 35 y.o. female with a history of substance abuse and addiction related to chronic low back pain.  She is presenting today complaining of chronic low back pain the onset of which is associated with a traumatic fall at age 17 years.  She reports suffering a compression fracture near the thoracolumbar junction, but the majority of her pain has been in the low back, between the hips.  Physical examination is most consistent with facet arthropathy at the lowest levels of the lumbar spine.  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  If the trauma was severe enough to produce compression, it is likely also severe enough to D grade the facet joints as well.  I have recommended a bilateral, diagnostic medial branch block.  Patient was advised of the interventional sequence which requires is to perform to diagnostic blocks prior to the radiofrequency ablation.  The risks and benefits of the interventional sequence were discussed she would like to move forward.    8/27/2020- 35-year-old female presents status post # 1 of 2  bilateral lumbar MBB targeting L4-5 and L5-S1 she is reporting 90% relief for 24 hr and pain slowly returned she reports improved functionality which allowed her to perform her ADLs.      : Reviewed and consistent with medication use as prescribed.    Treatment Plan:   Procedures: s/f bilateral MBB #2 of 2.  Plan for 2nd Lumbar MBB targeting L4-5 and L5-S1 and ultimately RFA, if appropriate.  PT/OT/HEP: Patient advised to perform core strengthening HEP in addition to stretching for maintenance of back health.  Medications:    - Cont Ibuprofen 600 mg TID PRN with Tylenol 1000 mg TID PRN together.   - Avoid opioids and other controlled substances dur to Hx of drug abuse  Labs: reviewed and medications are appropriately dosed for current hepatorenal function.  Imaging:  Previous imaging was reviewed and discussed with the patient today.   Follow Up: RTC 2-3 weeks virtually or in person.     Santana Stone, EFRAIN-C  Interventional Pain Management      Disclaimer: This note was partly generated using dictation software which may occasionally result in transcription errors.

## 2020-08-31 ENCOUNTER — TELEPHONE (OUTPATIENT)
Dept: PAIN MEDICINE | Facility: CLINIC | Age: 35
End: 2020-08-31

## 2020-08-31 ENCOUNTER — TELEPHONE (OUTPATIENT)
Dept: HEPATOLOGY | Facility: CLINIC | Age: 35
End: 2020-08-31

## 2020-08-31 ENCOUNTER — OFFICE VISIT (OUTPATIENT)
Dept: HEPATOLOGY | Facility: CLINIC | Age: 35
End: 2020-08-31
Payer: COMMERCIAL

## 2020-08-31 DIAGNOSIS — M47.816 LUMBAR SPONDYLOSIS: Primary | ICD-10-CM

## 2020-08-31 DIAGNOSIS — R74.8 ABNORMAL TRANSAMINASES: ICD-10-CM

## 2020-08-31 DIAGNOSIS — B18.2 CHRONIC HEPATITIS C WITHOUT HEPATIC COMA: Primary | ICD-10-CM

## 2020-08-31 DIAGNOSIS — B19.20 HEPATITIS C VIRUS INFECTION WITHOUT HEPATIC COMA, UNSPECIFIED CHRONICITY: ICD-10-CM

## 2020-08-31 PROCEDURE — 99203 PR OFFICE/OUTPT VISIT, NEW, LEVL III, 30-44 MIN: ICD-10-PCS | Mod: 95,,, | Performed by: PHYSICIAN ASSISTANT

## 2020-08-31 PROCEDURE — 99203 OFFICE O/P NEW LOW 30 MIN: CPT | Mod: 95,,, | Performed by: PHYSICIAN ASSISTANT

## 2020-08-31 NOTE — PROGRESS NOTES
HEPATOLOGY VIDEO VISIT NOTE - HCV clinic  The patient location is: her [facility]  Visit type: audiovisual    Face to Face time with patient: 11 minutes  18 minutes of total time spent on the encounter, which includes face to face time and non-face to face time preparing to see the patient (eg, review of tests), Obtaining and/or reviewing separately obtained history, Documenting clinical information in the electronic or other health record, Independently interpreting results (not separately reported) and communicating results to the patient/family/caregiver, or Care coordination (not separately reported).     Each patient to whom he or she provides medical services by telemedicine is:  (1) informed of the relationship between the physician and patient and the respective role of any other health care provider with respect to management of the patient; and (2) notified that he or she may decline to receive medical services by telemedicine and may withdraw from such care at any time.    REFERRING PROVIDER: Medina Arellano MD    CHIEF COMPLAINT: Hepatitis C     HISTORY: This is a 35 y.o. White female with chronic hepatitis C, here for further eval / mngmt.     HCV history:  Originally diagnosed 1.5 yrs ago when she presented for drug rehab.  Believes her infection is recent as she thinks she screened neg for HCV ~ 6 months prior.   Noted she had normal transaminases (in teens) 2012, 2016, 2017) but now elevated w/ AST 55,   Prior icteric illnesses: None  Risks for HCV:  Blood transfusion - no    IVDA - first use age 31    Intranasal drug use - first use age 17    Tattoos - first one placed age 13  - Treatment naive  - Genotype 1a  - NS5A resistance not known  - HCV RNA >100,000 IU/mL - 8/2020    Liver staging:  No formal liver staging  Labs and imaging reveal well preserved liver function w/ no obvious evidence of advanced fibrosis    Feels well  Denies jaundice, dark urine, abdominal  distention, hematemesis, melena, slowed mentation.   No abnormal skin rashes.                     Past Medical History:   Diagnosis Date    Anxiety     Depression     Herpes genitalia        Past Surgical History:   Procedure Laterality Date     SECTION      INJECTION OF ANESTHETIC AGENT AROUND MEDIAL BRANCH NERVES INNERVATING LUMBAR FACET JOINT Bilateral 2020    Procedure: Block-nerve-medial branch-lumbar--Bilateral L4-5, L5-S1;  Surgeon: Lisbeth Noriega Jr., MD;  Location: [facility];  Service: Pain Management;  Laterality: Bilateral;       FAMILY HISTORY: Negative for liver disease    SOCIAL HISTORY:   Has 5 year old son  Social History     Tobacco Use   Smoking Status Current Every Day Smoker    Packs/day: 1.00   Smokeless Tobacco Never Used     Alcohol - none now but denies any prior periods of heavy / regular alcohol use  Drugs - prior drug use, s/p rehab      ROS:   No fever, chills, weight loss, fatigue  No chest pain, palpitations, dyspnea, cough  No abdominal pain, nausea, vomiting, GERD  No dysuria, hematuria   No skin rashes   No headaches  (+) back pain  No lower extremity edema  No depression or anxiety    PHYSICAL EXAM:  Friendly White female, in no acute distress; alert and oriented to person, place and time  LUNGS: Normal respiratory effort.  NEURO/PSYCH: Memory intact. Thought and speech pattern appropriate. Behavior normal. No depression or anxiety noted.    RECENT LABS:  Lab Results   Component Value Date    WBC 8.00 08/10/2020    HGB 15.3 08/10/2020     08/10/2020     No results found for: INR  Lab Results   Component Value Date    AST 55 (H) 08/10/2020     (H) 08/10/2020    BILITOT 0.2 08/10/2020    ALBUMIN 4.2 08/10/2020    ALKPHOS 92 08/10/2020    CREATININE 0.8 08/10/2020    BUN 9 08/10/2020     08/10/2020    K 4.3 08/10/2020       RECENT IMAGING:  Results for orders placed during the hospital encounter of 20   US Liver with Doppler (xpd)     Narrative EXAMINATION:  US LIVER WITH DOPPLER    CLINICAL HISTORY:  hepatitis C; Unspecified viral hepatitis C without hepatic coma    TECHNIQUE:  Ultrasound liver with Doppler.  Color and spectral Doppler were performed.    COMPARISON:  None.    FINDINGS:  The liver is normal in size.  The liver demonstrates homogeneous echotexture.  No focal hepatic lesions are seen.    The main portal vein, right portal vein, left portal vein, middle hepatic vein, right hepatic vein, left hepatic vein, SMV, and IVC are patent with proper directional flow.  The main hepatic artery is patent. The umbilical vein is not patent.    No evidence of ascites.      Impression Satisfactory Doppler evaluation of the liver.      Electronically signed by: Linwood Dey MD  Date:    08/24/2020  Time:    11:01           ASSESSMENT:  35 y.o. White female with:  1. CHRONIC HEPATITIS C, GENOTYPE 1a - treatment naive  -- Elevated transaminases  -- Unknown Immunity to HAV & HBV    EDUCATION:  The natural history of Hepatitis C, including potential progression to cirrhosis was reviewed. We discussed the increased progression of liver disease secondary to alcohol use; patient was advised to avoid alcohol completely.     Transmission of Hepatitis C was reviewed, including possible sexual transmission. Sexual contacts should be screened. Risk of vertical transmission of Hepatitis C from mother to baby was reviewed. Children should be screened. Patient should avoid sharing personal products such as razors, toothbrushes, etc.     Recommend avoiding raw seafood.  Limit acetaminophen to 2000mg daily.        PLAN:  1. Labs, FibroScan, f/u visit on same day in few weeks. Goal of antiviral therapy  Orders Placed This Encounter   Procedures    FibroScan (Vibration Controlled Transient Elastography)    Hepatitis B Surface Antigen    HIV 1/2 Ag/Ab (4th Gen)    Hepatitis B Surface Ab, Qualitative    Hepatitis B Core Antibody, Total    Hepatitis A antibody,  IgG

## 2020-08-31 NOTE — TELEPHONE ENCOUNTER
Pt scheduled for 9/3/20 at 0830 am for Bilateral Lumbar MBB. Pt aware to check in at registration desk on the first floor of the hospital for 0730 am. Pt denied taking blood thinners and is not diabetic. Pt denied having having a pacemaker/ICD.

## 2020-08-31 NOTE — LETTER
August 31, 2020      Medina Arellano MD  [address] [facility] [facility] [address]           [address] - Transplant 1st [address]  [address]  [facility] [address]  Phone: [phone]  Fax: [phone]          Patient: Agnes Alonso   MR Number: [id number]   YOB: 1985   Date of Visit: 8/31/2020       Dear Dr. Medina Arellano:    Thank you for referring Agnes Alonso to me for evaluation. Attached you will find relevant portions of my assessment and plan of care.    If you have questions, please do not hesitate to call me. I look forward to following Agnes Alonso along with you.    Sincerely,    Jennifer B. Scheuermann, PA    Enclosure  CC:  No Recipients    If you would like to receive this communication electronically, please contact [email or web address] or [phone] to request more information on [employer] access.    For providers and/or their staff who would like to refer a patient to [facility], please contact us through our one-stop-shop provider referral line, [facility], at [phone].    If you feel you have received this communication in error or would no longer like to receive these types of communications, please e-mail [email or web address]

## 2020-08-31 NOTE — TELEPHONE ENCOUNTER
Patient cancelled virtual visit with PA Scheuermann on 8/28.  I spoke with her and appt rescheduled for today.

## 2020-08-31 NOTE — TELEPHONE ENCOUNTER
----- Message from Jennifer B. Scheuermann, PA sent at 8/31/2020  1:57 PM CDT -----  Fibroscan, visit, labs - all on same day whenever it fits in schedulethanks

## 2020-09-01 NOTE — DISCHARGE INSTRUCTIONS
Home Care Instructions Pain Management:    1.  DIET:    You may resume your normal diet today.    2.  BATHING:    You may shower with luke warm water.    3.  DRESSING:    You may remove your bandage today.    4.  ACTIVITY LEVEL:      You may resume your normal activities 24 hours after your procedure.    5.  MEDICATIONS:    You may resume your normal medications today.    6.  SPECIAL INSTRUCTIONS:    No heat to the injection site for 24 hours including bath or shower, heating pad, moist heat or hot tubs.    Use an ice pack to the injection site for any pain or discomfort.  Apply ice packs for 20 minute intervals as needed.    If you have received any sedatives by mouth today, you can not drive for 12 hours.    If you have received sedation through an IV, you can not drive for 24 hours.    PLEASE CALL YOUR DOCTOR FOR THE FOLLOWIN.  Redness or swelling around the injection site.  2.  Fever of 101 degrees.  3.  Drainage (pus) from the injection site.  4.  For any continuous bleeding (some dried blood over the incision is normal.)    FOR EMERGENCIES:    If any unusual problems or difficulties occur during clinic hours, call [phone] or dial [phone].    Follow up with with your physician in 2-3 weeks.

## 2020-09-03 ENCOUNTER — LAB VISIT (OUTPATIENT)
Dept: LAB | Facility: HOSPITAL | Age: 35
End: 2020-09-03
Payer: COMMERCIAL

## 2020-09-03 ENCOUNTER — CLINICAL SUPPORT (OUTPATIENT)
Dept: AUDIOLOGY | Facility: CLINIC | Age: 35
End: 2020-09-03
Payer: COMMERCIAL

## 2020-09-03 ENCOUNTER — OFFICE VISIT (OUTPATIENT)
Dept: OTOLARYNGOLOGY | Facility: CLINIC | Age: 35
End: 2020-09-03
Payer: COMMERCIAL

## 2020-09-03 ENCOUNTER — HOSPITAL ENCOUNTER (OUTPATIENT)
Facility: HOSPITAL | Age: 35
Discharge: HOME | End: 2020-09-03
Attending: PAIN MEDICINE | Admitting: PAIN MEDICINE
Payer: COMMERCIAL

## 2020-09-03 ENCOUNTER — TELEPHONE (OUTPATIENT)
Dept: HEPATOLOGY | Facility: CLINIC | Age: 35
End: 2020-09-03

## 2020-09-03 VITALS
RESPIRATION RATE: 16 BRPM | BODY MASS INDEX: 22.49 KG/M2 | OXYGEN SATURATION: 100 % | TEMPERATURE: 97 F | HEIGHT: 65 IN | DIASTOLIC BLOOD PRESSURE: 66 MMHG | SYSTOLIC BLOOD PRESSURE: 107 MMHG | WEIGHT: 135 LBS | HEART RATE: 90 BPM

## 2020-09-03 DIAGNOSIS — G89.29 CHRONIC PAIN: ICD-10-CM

## 2020-09-03 DIAGNOSIS — B18.2 CHRONIC HEPATITIS C WITHOUT HEPATIC COMA: ICD-10-CM

## 2020-09-03 DIAGNOSIS — M47.816 LUMBAR SPONDYLOSIS: Primary | ICD-10-CM

## 2020-09-03 DIAGNOSIS — H93.293 ABNORMAL AUDITORY PERCEPTION OF BOTH EARS: Primary | ICD-10-CM

## 2020-09-03 DIAGNOSIS — H61.21 IMPACTED CERUMEN OF RIGHT EAR: ICD-10-CM

## 2020-09-03 DIAGNOSIS — H92.03 OTALGIA OF BOTH EARS: Primary | ICD-10-CM

## 2020-09-03 DIAGNOSIS — H92.03 OTALGIA OF BOTH EARS: ICD-10-CM

## 2020-09-03 DIAGNOSIS — M26.609 TMJ (TEMPOROMANDIBULAR JOINT SYNDROME): ICD-10-CM

## 2020-09-03 PROCEDURE — 92567 TYMPANOMETRY: CPT | Mod: S$GLB,,, | Performed by: AUDIOLOGIST

## 2020-09-03 PROCEDURE — 99213 OFFICE O/P EST LOW 20 MIN: CPT | Mod: 25,S$GLB,, | Performed by: NURSE PRACTITIONER

## 2020-09-03 PROCEDURE — 86703 HIV-1/HIV-2 1 RESULT ANTBDY: CPT

## 2020-09-03 PROCEDURE — 86706 HEP B SURFACE ANTIBODY: CPT

## 2020-09-03 PROCEDURE — 63600175 PHARM REV CODE 636 W HCPCS: Performed by: PAIN MEDICINE

## 2020-09-03 PROCEDURE — 86790 VIRUS ANTIBODY NOS: CPT

## 2020-09-03 PROCEDURE — 99999 PR PBB SHADOW E&M-EST. PATIENT-LVL III: CPT | Mod: PBBFAC,,, | Performed by: NURSE PRACTITIONER

## 2020-09-03 PROCEDURE — 92567 PR TYMPA2METRY: ICD-10-PCS | Mod: S$GLB,,, | Performed by: AUDIOLOGIST

## 2020-09-03 PROCEDURE — 64493 INJ PARAVERT F JNT L/S 1 LEV: CPT | Mod: 50,,, | Performed by: PAIN MEDICINE

## 2020-09-03 PROCEDURE — 99213 PR OFFICE/OUTPT VISIT, EST, LEVL III, 20-29 MIN: ICD-10-PCS | Mod: 25,S$GLB,, | Performed by: NURSE PRACTITIONER

## 2020-09-03 PROCEDURE — 92557 PR COMPREHENSIVE HEARING TEST: ICD-10-PCS | Mod: S$GLB,,, | Performed by: AUDIOLOGIST

## 2020-09-03 PROCEDURE — 86704 HEP B CORE ANTIBODY TOTAL: CPT

## 2020-09-03 PROCEDURE — 69210 REMOVE IMPACTED EAR WAX UNI: CPT | Mod: S$GLB,,, | Performed by: NURSE PRACTITIONER

## 2020-09-03 PROCEDURE — 69210 EAR CERUMEN REMOVAL: ICD-10-PCS | Mod: S$GLB,,, | Performed by: NURSE PRACTITIONER

## 2020-09-03 PROCEDURE — 36415 COLL VENOUS BLD VENIPUNCTURE: CPT

## 2020-09-03 PROCEDURE — 25500020 PHARM REV CODE 255: Performed by: PAIN MEDICINE

## 2020-09-03 PROCEDURE — 64493 PR INJ DX/THER AGNT PARAVERT FACET JOINT,IMG GUIDE,LUMBAR/SAC,1ST LVL: ICD-10-PCS | Mod: 50,,, | Performed by: PAIN MEDICINE

## 2020-09-03 PROCEDURE — 64493 INJ PARAVERT F JNT L/S 1 LEV: CPT | Mod: 50 | Performed by: PAIN MEDICINE

## 2020-09-03 PROCEDURE — 99999 PR PBB SHADOW E&M-EST. PATIENT-LVL III: ICD-10-PCS | Mod: PBBFAC,,, | Performed by: NURSE PRACTITIONER

## 2020-09-03 PROCEDURE — 64494 INJ PARAVERT F JNT L/S 2 LEV: CPT | Mod: 50 | Performed by: PAIN MEDICINE

## 2020-09-03 PROCEDURE — 99152 MOD SED SAME PHYS/QHP 5/>YRS: CPT | Performed by: PAIN MEDICINE

## 2020-09-03 PROCEDURE — 25000003 PHARM REV CODE 250: Performed by: PAIN MEDICINE

## 2020-09-03 PROCEDURE — 99999 PR PBB SHADOW E&M-EST. PATIENT-LVL I: ICD-10-PCS | Mod: PBBFAC,,, | Performed by: AUDIOLOGIST

## 2020-09-03 PROCEDURE — 87340 HEPATITIS B SURFACE AG IA: CPT

## 2020-09-03 PROCEDURE — 64494 PR INJ DX/THER AGNT PARAVERT FACET JOINT,IMG GUIDE,LUMBAR/SAC, 2ND LEVEL: ICD-10-PCS | Mod: 50,,, | Performed by: PAIN MEDICINE

## 2020-09-03 PROCEDURE — 99999 PR PBB SHADOW E&M-EST. PATIENT-LVL I: CPT | Mod: PBBFAC,,, | Performed by: AUDIOLOGIST

## 2020-09-03 PROCEDURE — 92557 COMPREHENSIVE HEARING TEST: CPT | Mod: S$GLB,,, | Performed by: AUDIOLOGIST

## 2020-09-03 PROCEDURE — 64494 INJ PARAVERT F JNT L/S 2 LEV: CPT | Mod: 50,,, | Performed by: PAIN MEDICINE

## 2020-09-03 RX ORDER — INDOMETHACIN 25 MG/1
CAPSULE ORAL
Status: DISCONTINUED | OUTPATIENT
Start: 2020-09-03 | End: 2020-09-03 | Stop reason: HOSPADM

## 2020-09-03 RX ORDER — FENTANYL CITRATE 50 UG/ML
INJECTION, SOLUTION INTRAMUSCULAR; INTRAVENOUS
Status: DISCONTINUED | OUTPATIENT
Start: 2020-09-03 | End: 2020-09-03 | Stop reason: HOSPADM

## 2020-09-03 RX ORDER — MIDAZOLAM HYDROCHLORIDE 1 MG/ML
INJECTION, SOLUTION INTRAMUSCULAR; INTRAVENOUS
Status: DISCONTINUED | OUTPATIENT
Start: 2020-09-03 | End: 2020-09-03 | Stop reason: HOSPADM

## 2020-09-03 RX ORDER — SODIUM CHLORIDE 9 MG/ML
500 INJECTION, SOLUTION INTRAVENOUS CONTINUOUS
Status: DISCONTINUED | OUTPATIENT
Start: 2020-09-03 | End: 2021-03-29

## 2020-09-03 RX ORDER — LIDOCAINE HYDROCHLORIDE 10 MG/ML
1 INJECTION, SOLUTION EPIDURAL; INFILTRATION; INTRACAUDAL; PERINEURAL ONCE
Status: COMPLETED | OUTPATIENT
Start: 2020-09-03 | End: 2020-09-03

## 2020-09-03 RX ORDER — BUPIVACAINE HYDROCHLORIDE 2.5 MG/ML
INJECTION, SOLUTION EPIDURAL; INFILTRATION; INTRACAUDAL
Status: DISCONTINUED | OUTPATIENT
Start: 2020-09-03 | End: 2020-09-03 | Stop reason: HOSPADM

## 2020-09-03 NOTE — DISCHARGE SUMMARY
[facility]  Discharge Note  Short Stay     Admit Date: 9/3/2020    Discharge Date: 9/3/2020     Attending Physician: Lisbeth Noriega Jr, MD    Diagnoses:  Active Hospital Problems    Diagnosis  POA    Chronic pain [G89.29]  Yes      Resolved Hospital Problems   No resolved problems to display.     Discharged Condition: Good     Hospital Course: Patient was admitted for an outpatient interventional pain management procedure and tolerated the procedure well with no complications.     Final Diagnoses: Same as principal problem.     Disposition: Home or Self Care     Follow up/Patient Instructions:    Follow-up Information     Lisbeth Noriega Jr, MD. Go in 2 days.    Specialty: Pain Medicine  Why: Post-procedural Follow Up As Scheduled, Call to make an appointment if you do not have one  Contact information:  [address]  [address]  [place] [id number] [phone]                   Reconciled Medications:     Medication List      CHANGE how you take these medications    valACYclovir 500 MG tablet  Commonly known as: VALTREX  Take 1 tablet (500 mg total) by mouth 2 (two) times daily.  What changed:   · when to take this  · reasons to take this        CONTINUE taking these medications    ibuprofen 200 MG tablet  Commonly known as: ADVIL,MOTRIN  Take 200 mg by mouth every 6 (six) hours as needed for Pain.           Discharge Procedure Orders (must include Diet, Follow-up, Activity)   Call MD for:  temperature >100.4     Call MD for:  severe uncontrolled pain     Call MD for:  redness, tenderness, or signs of infection (pain, swelling, redness, odor or green/yellow discharge around incision site)     Call MD for:  difficulty breathing or increased cough     Call MD for:  severe persistent headache     Call MD for:  worsening rash     Remove dressing in 24 hours       Lisbeth Noriega Jr, MD  Interventional Pain Medicine / Anesthesiology

## 2020-09-03 NOTE — PROGRESS NOTES
Patients vital signs normal. IV removed. Reviewed Pain diary and D/C instructions with patient. Patient verbalized understanding. Patient wheeled to the front via W/C

## 2020-09-03 NOTE — TELEPHONE ENCOUNTER
----- Message from Silvio Waldron sent at 9/3/2020  9:36 AM CDT -----  Regarding: Reschedule  Type:  Needs Medical Advice    Who Called:  patient  Would the patient rather a call back or a response via [facility]?  Call back  Best Call Back Number:  [phone]  Additional Information:  please call today

## 2020-09-03 NOTE — PROGRESS NOTES
Subjective:      Agnes Alonso is a 35 y.o. female who was self-referred for ear pain.    Ms. Horan reports ear pain (R>L) for many years. She reports the pain comes and goes and usually radiates down jaw line. She has a history of significant dental work and oral plate. She denies pain with chewing. She denies ear drainage but does not ear fullness and pressure.  There is not a family history of hearing loss at a young age.  There is not a prior history of ear surgery.  There is a prior history of ear infections .  She denies a history of significant noise exposure.  She does not wear hearing aids currently.  She has not had a hearing test recently.      Past Medical History  She has a past medical history of Anxiety, Depression, and Herpes genitalia.    Past Surgical History  She has a past surgical history that includes  section; Injection of anesthetic agent around medial branch nerves innervating lumbar facet joint (Bilateral, 2020); and Injection of anesthetic agent around medial branch nerves innervating lumbar facet joint (Bilateral, 9/3/2020).    Family History  Her family history includes Depression in her mother and sister; Diabetes in her maternal uncle; Hypertension in her mother and paternal grandmother.    Social History  She reports that she has been smoking. She has been smoking about 1.00 pack per day. She has never used smokeless tobacco. She reports previous drug use. She reports that she does not drink alcohol.    Allergies  She is allergic to penicillins.    Medications  She has a current medication list which includes the following prescription(s): ibuprofen and valacyclovir, and the following Facility-Administered Medications: sodium chloride 0.9%, sodium chloride 0.9%, and lidocaine (pf) 10 mg/ml (1%).    Review of Systems   Constitutional: Negative.  Negative for chills, fatigue and fever.   HENT: Positive for ear pain, postnasal drip, sinus pressure and sore  throat. Negative for congestion, facial swelling, hearing loss, nosebleeds, rhinorrhea, sinus pain, sneezing and tinnitus.    Eyes: Negative.  Negative for photophobia, redness, itching and visual disturbance.   Respiratory: Negative.  Negative for apnea, cough, shortness of breath, wheezing and stridor.    Cardiovascular: Negative.  Negative for chest pain and palpitations.   Gastrointestinal: Negative.  Negative for diarrhea, nausea and vomiting.   Endocrine: Negative.    Genitourinary: Negative.  Negative for decreased urine volume, dysuria and frequency.   Musculoskeletal: Positive for back pain and neck pain. Negative for arthralgias, myalgias and neck stiffness.   Skin: Negative.  Negative for rash and wound.   Allergic/Immunologic: Negative for environmental allergies, food allergies and immunocompromised state.   Neurological: Positive for headaches. Negative for dizziness, syncope, weakness and light-headedness.   Hematological: Negative.  Negative for adenopathy. Does not bruise/bleed easily.   Psychiatric/Behavioral: Negative.  Negative for confusion, decreased concentration and sleep disturbance.          Objective:     There were no vitals taken for this visit.     Constitutional:   She is oriented to person, place, and time. Vital signs are normal. She appears well-developed and well-nourished. She appears alert. Normal speech.      Head:  Normocephalic and atraumatic. Head is with TMJ tenderness (with clicking). Salivary glands normal.  Facial strength is normal.      Ears:    Right Ear: No lacerations. No drainage, swelling or tenderness. No foreign bodies. No mastoid tenderness. Tympanic membrane is not injected, not scarred, not perforated, not erythematous, not retracted and not bulging. Tympanic membrane mobility is normal. No middle ear effusion. No hemotympanum. No decreased hearing is noted.   Left Ear: No lacerations. No drainage, swelling or tenderness. No foreign bodies. No mastoid  tenderness. Tympanic membrane is not injected, not scarred, not perforated, not erythematous, not retracted and not bulging. Tympanic membrane mobility is normal.  No middle ear effusion. No hemotympanum. No decreased hearing is noted.   Ears:      Nose:  No mucosal edema, rhinorrhea, nose lacerations, sinus tenderness, septal deviation, nasal septal hematoma or polyps. No epistaxis.  No foreign bodies. Right sinus exhibits no maxillary sinus tenderness and no frontal sinus tenderness. Left sinus exhibits no maxillary sinus tenderness and no frontal sinus tenderness.     Mouth/Throat  Oropharynx clear and moist without lesions or asymmetry, normal uvula midline and lips, teeth, and gums normal. No uvula swelling, oral lesions, trismus, mucous membrane lesions or xerostomia. No oropharyngeal exudate, posterior oropharyngeal edema or posterior oropharyngeal erythema.     Neck:  Neck normal without thyromegaly masses, asymmetry, normal tracheal structure, crepitus, and tenderness and no adenopathy.     Psychiatric:   She has a normal mood and affect. Her speech is normal and behavior is normal.     Neurological:   She is alert and oriented to person, place, and time. No cranial nerve deficit.     Skin:   No abrasions, lacerations, lesions, or rashes.       Procedure    Cerumen removal performed.  See procedure note.      Data Reviewed    WBC (K/uL)   Date Value   08/10/2020 8.00     Platelets (K/uL)   Date Value   08/10/2020 307      Creatinine (mg/dL)   Date Value   08/10/2020 0.8     TSH (uIU/mL)   Date Value   04/12/2017 1.355     Glucose (mg/dL)   Date Value   08/10/2020 79     No results found for: HGBA1C    I independently reviewed the tracings of the complete audiometric evaluation performed today.  I reviewed the audiogram with the patient as well.  Pertinent findings include a normal study.         Assessment:     1. Otalgia of both ears    2. TMJ (temporomandibular joint syndrome)    3. Impacted cerumen of  right ear         Plan:     I had a long discussion with the patient regarding her condition and the further workup and management options.    Cerumen impaction removed under microscope.  Normal Audiometric testing.  Use ibuprofen for 2 weeks to reduce inflammation of the jaw joint.  Place an ice pack on jaw joint (in front of ear) 4 times a day for 2 weeks.  Avoid hard or crunchy food for 2 weeks.  If symptoms persist, you should be evaluated by a dentist or oral surgeon.    Follow up if symptoms worsen or fail to improve.

## 2020-09-03 NOTE — PROGRESS NOTES
Agnes Alonso was seen today in the clinic for an audiologic evaluation.  Patients main complaint was right sided ear pain and aural fullness. Patient reported that her hearing has also decreased      Tympanometry revealed Type A in the right ear and Type A in the left ear.  Audiogram results revealed normal hearing bilaterally.  Speech reception thresholds were noted at 0 dB in the right ear and 0 dB in the left ear.  Speech discrimination scores were 100% in the right ear and 100% in the left ear.    Recommendations:  1. Otologic evaluation  2. Annual audiogram  3. Noise protection when in noise

## 2020-09-03 NOTE — PROCEDURES
Ear Cerumen Removal    Date/Time: 9/3/2020 3:30 PM  Performed by: Gabi Chau NP  Authorized by: Gabi Chau NP     Consent Done?:  Yes (Verbal)  Medication Used:  Cerumenex  Location details:  Right ear  Procedure type: curette    Cerumen  Removal Results:  Cerumen completely removed  Patient tolerance:  Patient tolerated the procedure well with no immediate complications     Procedure Note:    Patient was brought to the minor procedure room and using the operating microscope of the right ear canal which was cleaned of ceruminous debris. There was a significant cerumen impaction.  Using a combination of suction, curettes and cup forceps the patient's cerumen impaction was removed. Tympanic membrane intact. Pt tolerated well. There were no complications.

## 2020-09-03 NOTE — OP NOTE
"Procedure Note    Pre-operative diagnosis: Lumbar Spondylosis  Post-operative diagnosis: Lumbar Spondylosis  Procedure Date: 09/03/2020  Procedure:  (1) Bilateral L4-5 and L5-S1 Lumbar Medial Branch Block     (2) Fluoroscopy for needle guidance    (3) IV Moderate Sedation (Midazolam 2 mg, Fentanyl 50 mcg)    Procedure in detail:  Informed consent obtained after explaining the procedure and potential complications including local discomfort, infection, headache, temporary or permanent weakness and/or numbness of one or both legs, temporary or permanent paraplegia, heart attack and stroke. All questions were answered.      Patient was taken to the procedure room and placed in prone position.  Time out was performed.  Patient's Lumbar area was prepped and draped in a sterile manner.  AP and oblique fluoroscopy were used to identify and jessica the junctions between the superior articular processes and transverse processes at the L4 and L5 vertebral levels as well as the sacral ala on the Right side.  Skin and tissues overlying the targeted points were anesthetized with Lidocaine 1% (10 mL) + sodium bicarbonate (1 mL) using a 25G 1.25" needle.  Then, under fluoroscopic guidance, a 22 G 3.5 inch spinal needle, was advanced through the anesthetized tissues toward the targeted points corresponding to the locations of the L3,4,5 medial branch nerves.     After heme-negative aspiration, up to 1 mL of contrast was divided among the levels and administered through each needle demonstrating local accumulation.  Next, 1 mL of 0.25% bupivacaine was injected at each of the above targeted points: the locations of the L3,4,5 medial branch nerves.     The procedure was repeated on the Left side with similar findings.  Needle placement and contrast dispersion were consistent with successful medial branch nerve block.    Needle was removed with flush and bandaged placed over site of needle insertion.      Estimated Blood Loss: " None    Disposition: The patient tolerated the procedure without complaint and was transported to the recovery room in stable condition.    Follow-up: Return for second MBB or RFA if appropriate      Lisbeth Noriega Jr, MD  Interventional Pain Medicine / Anesthesiology

## 2020-09-04 LAB
HBV CORE AB SERPL QL IA: NEGATIVE
HBV SURFACE AB SER-ACNC: POSITIVE M[IU]/ML
HBV SURFACE AG SERPL QL IA: NEGATIVE
HEPATITIS A ANTIBODY, IGG: POSITIVE
HIV 1+2 AB+HIV1 P24 AG SERPL QL IA: NEGATIVE

## 2020-09-08 ENCOUNTER — OFFICE VISIT (OUTPATIENT)
Dept: PAIN MEDICINE | Facility: CLINIC | Age: 35
End: 2020-09-08
Payer: COMMERCIAL

## 2020-09-08 DIAGNOSIS — M54.9 DORSALGIA, UNSPECIFIED: ICD-10-CM

## 2020-09-08 DIAGNOSIS — M54.50 CHRONIC BILATERAL LOW BACK PAIN WITHOUT SCIATICA: ICD-10-CM

## 2020-09-08 DIAGNOSIS — G89.29 CHRONIC BILATERAL LOW BACK PAIN WITHOUT SCIATICA: ICD-10-CM

## 2020-09-08 DIAGNOSIS — G89.4 CHRONIC PAIN SYNDROME: ICD-10-CM

## 2020-09-08 DIAGNOSIS — G89.21 CHRONIC PAIN DUE TO TRAUMA: ICD-10-CM

## 2020-09-08 DIAGNOSIS — M47.816 LUMBAR SPONDYLOSIS: Primary | ICD-10-CM

## 2020-09-08 PROCEDURE — 99213 OFFICE O/P EST LOW 20 MIN: CPT | Mod: 95,,, | Performed by: NURSE PRACTITIONER

## 2020-09-08 PROCEDURE — 99213 PR OFFICE/OUTPT VISIT, EST, LEVL III, 20-29 MIN: ICD-10-PCS | Mod: 95,,, | Performed by: NURSE PRACTITIONER

## 2020-09-08 NOTE — PROGRESS NOTES
[facility] Establ[facility]  Patient Evaluation  telemedicine Encounter    Telemedicine Bundle:  This visit was completed during the Coronavirus Crisis to enhance patient safety.  The patient location is: patient's home  The chief complaint leading to consultation is: Low back pain   Visit type: Virtual visit with synchronous audio and video  Total time spent with patient: 15 minutes   Each patient to whom he or she provides medical services by telemedicine is:    (1) informed of the relationship between the physician and patient and the respective role of any other health care provider with respect to management of the patient  (2) notified that he or she may decline to receive medical services by telemedicine and may withdraw from such care at any time.      Referred by: Dr Medina Arellano  Reason for referral: Low back pain    CC:   Low back pain      HPI:   Agnes Alonso is a 35 y.o. female who complains of low back pain which she attributes to a severe fall at age 17 yrs when she suffered considerable low back damage.  She reports having crushed vertebra near the thoracolumbar spine, though all of her pain is across the low back between the hips.  She reports a Hx of medication abuse and addiction and states a strong preference to avoid medications that might cause dependency or addiction.      Interval updates    09/08/2020-  35-year-old female presents via virtual visit patient is status post 2 of 2 lumbar MBB targeting L4-5 and L5-S1 reporting % relief patient reports improved functionality specifically being able to perform her activities of daily living with minimal to no pain.     8-- 35-year-old female presents status post #1 of 2  bilateral lumbar MBB targeting L4-5 and L5-S1 reporting 90% relief for 24 hours and then pain returned. Patient reports improved functionality.     Location: low back pain   Onset: at age 17 yrs  Current Pain Score: 3/10  Daily Pain of Range:  3-9/10  Quality: Aching, Dull, Burning, Throbbing, Tight and Deep  Radiation: mid-upper back area  Worsened by: running  Improved by: heat and massage    Previous Therapies:  PT/OT: Yes, completed 3x in the past 18 years.  Last in 2019.  HEP: Denies regular exercise beyond stretching.  Interventions: Denies  Surgery: Denies  Medications:   - NSAIDS:   - MSK Relaxants:   - TCAs:   - SNRIs:   - Topicals:   - Anticonvulsants:  - Opioids:     Current Pain Medications:  1. Ibuprofen 600 mg TID PRN - works well to eliminate/ prevent severe pain     Review of Systems:  Review of Systems   Constitutional: Negative for chills and fever.   HENT: Negative for nosebleeds.    Eyes: Negative for blurred vision.   Respiratory: Negative for hemoptysis.    Cardiovascular: Negative for chest pain and palpitations.   Gastrointestinal: Negative for heartburn and vomiting.   Genitourinary: Negative for hematuria.   Musculoskeletal: Positive for back pain. Negative for myalgias.   Skin: Negative for rash.   Neurological: Negative for tingling, seizures, loss of consciousness and weakness.   Endo/Heme/Allergies: Does not bruise/bleed easily.   Psychiatric/Behavioral: Positive for depression. Negative for hallucinations.       History:    Current Outpatient Medications:     ibuprofen (ADVIL,MOTRIN) 200 MG tablet, Take 200 mg by mouth every 6 (six) hours as needed for Pain., Disp: , Rfl:     valacyclovir (VALTREX) 500 MG tablet, Take 1 tablet (500 mg total) by mouth 2 (two) times daily. (Patient taking differently: Take 500 mg by mouth 2 (two) times daily as needed. ), Disp: 10 tablet, Rfl: 1  No current facility-administered medications for this visit.     Facility-Administered Medications Ordered in Other Visits:     0.9%  NaCl infusion, 500 mL, Intravenous, Continuous, Lisbeth Noriega Jr., MD    0.9%  NaCl infusion, 500 mL, Intravenous, Continuous, Lisbeth Noriega Jr., MD    lidocaine (PF) 10 mg/ml (1%) injection 10 mg, 1 mL,  Intradermal, Once, Lisbeth Noriega Jr., MD    Past Medical History:   Diagnosis Date    Anxiety     Depression     Herpes genitalia        Past Surgical History:   Procedure Laterality Date     SECTION      INJECTION OF ANESTHETIC AGENT AROUND MEDIAL BRANCH NERVES INNERVATING LUMBAR FACET JOINT Bilateral 2020    Procedure: Block-nerve-medial branch-lumbar--Bilateral L4-5, L5-S1;  Surgeon: Lisbeth Noriega Jr., MD;  Location: [facility];  Service: Pain Management;  Laterality: Bilateral;    INJECTION OF ANESTHETIC AGENT AROUND MEDIAL BRANCH NERVES INNERVATING LUMBAR FACET JOINT Bilateral 9/3/2020    Procedure: Block-nerve-medial branch-lumbar--Bilateral L4-5, L5-S1;  Surgeon: Lisbeth Noriega Jr., MD;  Location: [facility];  Service: Pain Management;  Laterality: Bilateral;       Family History   Problem Relation Age of Onset    Depression Mother     Hypertension Mother     Depression Sister     Hypertension Paternal Grandmother     Diabetes Maternal Uncle     Breast cancer Neg Hx     Ovarian cancer Neg Hx        Social History     Socioeconomic History    Marital status: Single     Spouse name: Not on file    Number of children: Not on file    Years of education: Not on file    Highest education level: Not on file   Occupational History    Not on file   Social Needs    Financial resource strain: Not on file    Food insecurity     Worry: Not on file     Inability: Not on file    Transportation needs     Medical: Not on file     Non-medical: Not on file   Tobacco Use    Smoking status: Current Every Day Smoker     Packs/day: 1.00    Smokeless tobacco: Never Used   Substance and Sexual Activity    Alcohol use: Never     Frequency: Never    Drug use: Not Currently    Sexual activity: Yes     Partners: Male     Birth control/protection: None   Lifestyle    Physical activity     Days per week: Not on file     Minutes per session: Not on file    Stress: To some extent    Relationships    Social connections     Talks on phone: Not on file     Gets together: Not on file     Attends Holiness service: Not on file     Active member of club or organization: Not on file     Attends meetings of clubs or organizations: Not on file     Relationship status: Not on file   Other Topics Concern    Not on file   Social History Narrative    Not on file       Review of patient's allergies indicates:   Allergen Reactions    Penicillins Hives       Physical Exam:  There were no vitals filed for this visit.  There was no PE done for this virtual visit.     Imaging:  None    Labs:  BMP  Lab Results   Component Value Date     08/10/2020    K 4.3 08/10/2020     08/10/2020    CO2 26 08/10/2020    BUN 9 08/10/2020    CREATININE 0.8 08/10/2020    CALCIUM 9.3 08/10/2020    ANIONGAP 8 08/10/2020    ESTGFRAFRICA >60.0 08/10/2020    EGFRNONAA >60.0 08/10/2020     Lab Results   Component Value Date     (H) 08/10/2020    AST 55 (H) 08/10/2020    ALKPHOS 92 08/10/2020    BILITOT 0.2 08/10/2020       Assessment:  Problem List Items Addressed This Visit        Neuro    Chronic pain       Orthopedic    Dorsalgia, unspecified    Chronic bilateral low back pain without sciatica       Other    Chronic pain due to trauma      Other Visit Diagnoses     Lumbar spondylosis    -  Primary          8/17/20 - Agnes Alonso is a 35 y.o. female with a history of substance abuse and addiction related to chronic low back pain.  She is presenting today complaining of chronic low back pain the onset of which is associated with a traumatic fall at age 17 years.  She reports suffering a compression fracture near the thoracolumbar junction, but the majority of her pain has been in the low back, between the hips.  Physical examination is most consistent with facet arthropathy at the lowest levels of the lumbar spine.  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  If the  trauma was severe enough to produce compression, it is likely also severe enough to D grade the facet joints as well.  I have recommended a bilateral, diagnostic medial branch block.  Patient was advised of the interventional sequence which requires is to perform to diagnostic blocks prior to the radiofrequency ablation.  The risks and benefits of the interventional sequence were discussed she would like to move forward.    8/27/2020- 35-year-old female presents status post # 1 of 2  bilateral lumbar MBB targeting L4-5 and L5-S1 she is reporting 90% relief for 24 hr and pain slowly returned she reports improved functionality which allowed her to perform her ADLs.     9/8/2020- 36 y/o female presents status post bilateral lumbar MBB 2 of 2 reporting % relief 2-3 days and then pain returned.  Discussed with patient we will now schedule her for a right than left lumbar MBB targeting the above-mentioned levels RFA is have been known to provide sustained relief for 6-18 months.    : Reviewed and consistent with medication use as prescribed.    Treatment Plan:   Procedures: s/f right than left lumbar RFA targeting L4-5 and L5-S1  PT/OT/HEP: Patient advised to perform core strengthening HEP in addition to stretching for maintenance of back health.  Medications:    - Cont Ibuprofen 600 mg TID PRN with Tylenol 1000 mg TID PRN together.   - Avoid opioids and other controlled substances dur to Hx of drug abuse  Labs: reviewed and medications are appropriately dosed for current hepatorenal function.  Imaging:  Previous imaging was reviewed and discussed with the patient today.   Follow Up: RTC 2-3 weeks virtually or in person.     Santana Stone, LIDYA  Interventional Pain Management      Disclaimer: This note was partly generated using dictation software which may occasionally result in transcription errors.

## 2020-09-08 NOTE — H&P (VIEW-ONLY)
[facility] Establ[facility]  Patient Evaluation  telemedicine Encounter    Telemedicine Bundle:  This visit was completed during the Coronavirus Crisis to enhance patient safety.  The patient location is: patient's home  The chief complaint leading to consultation is: Low back pain   Visit type: Virtual visit with synchronous audio and video  Total time spent with patient: 15 minutes   Each patient to whom he or she provides medical services by telemedicine is:    (1) informed of the relationship between the physician and patient and the respective role of any other health care provider with respect to management of the patient  (2) notified that he or she may decline to receive medical services by telemedicine and may withdraw from such care at any time.      Referred by: Dr Medina Arellano  Reason for referral: Low back pain    CC:   Low back pain      HPI:   Agnes Alonso is a 35 y.o. female who complains of low back pain which she attributes to a severe fall at age 17 yrs when she suffered considerable low back damage.  She reports having crushed vertebra near the thoracolumbar spine, though all of her pain is across the low back between the hips.  She reports a Hx of medication abuse and addiction and states a strong preference to avoid medications that might cause dependency or addiction.      Interval updates    09/08/2020-  35-year-old female presents via virtual visit patient is status post 2 of 2 lumbar MBB targeting L4-5 and L5-S1 reporting % relief patient reports improved functionality specifically being able to perform her activities of daily living with minimal to no pain.     8-- 35-year-old female presents status post #1 of 2  bilateral lumbar MBB targeting L4-5 and L5-S1 reporting 90% relief for 24 hours and then pain returned. Patient reports improved functionality.     Location: low back pain   Onset: at age 17 yrs  Current Pain Score: 3/10  Daily Pain of Range:  3-9/10  Quality: Aching, Dull, Burning, Throbbing, Tight and Deep  Radiation: mid-upper back area  Worsened by: running  Improved by: heat and massage    Previous Therapies:  PT/OT: Yes, completed 3x in the past 18 years.  Last in 2019.  HEP: Denies regular exercise beyond stretching.  Interventions: Denies  Surgery: Denies  Medications:   - NSAIDS:   - MSK Relaxants:   - TCAs:   - SNRIs:   - Topicals:   - Anticonvulsants:  - Opioids:     Current Pain Medications:  1. Ibuprofen 600 mg TID PRN - works well to eliminate/ prevent severe pain     Review of Systems:  Review of Systems   Constitutional: Negative for chills and fever.   HENT: Negative for nosebleeds.    Eyes: Negative for blurred vision.   Respiratory: Negative for hemoptysis.    Cardiovascular: Negative for chest pain and palpitations.   Gastrointestinal: Negative for heartburn and vomiting.   Genitourinary: Negative for hematuria.   Musculoskeletal: Positive for back pain. Negative for myalgias.   Skin: Negative for rash.   Neurological: Negative for tingling, seizures, loss of consciousness and weakness.   Endo/Heme/Allergies: Does not bruise/bleed easily.   Psychiatric/Behavioral: Positive for depression. Negative for hallucinations.       History:    Current Outpatient Medications:     ibuprofen (ADVIL,MOTRIN) 200 MG tablet, Take 200 mg by mouth every 6 (six) hours as needed for Pain., Disp: , Rfl:     valacyclovir (VALTREX) 500 MG tablet, Take 1 tablet (500 mg total) by mouth 2 (two) times daily. (Patient taking differently: Take 500 mg by mouth 2 (two) times daily as needed. ), Disp: 10 tablet, Rfl: 1  No current facility-administered medications for this visit.     Facility-Administered Medications Ordered in Other Visits:     0.9%  NaCl infusion, 500 mL, Intravenous, Continuous, Lisbeth Noriega Jr., MD    0.9%  NaCl infusion, 500 mL, Intravenous, Continuous, Lisbeth Noriega Jr., MD    lidocaine (PF) 10 mg/ml (1%) injection 10 mg, 1 mL,  Intradermal, Once, Lisbeth Noriega Jr., MD    Past Medical History:   Diagnosis Date    Anxiety     Depression     Herpes genitalia        Past Surgical History:   Procedure Laterality Date     SECTION      INJECTION OF ANESTHETIC AGENT AROUND MEDIAL BRANCH NERVES INNERVATING LUMBAR FACET JOINT Bilateral 2020    Procedure: Block-nerve-medial branch-lumbar--Bilateral L4-5, L5-S1;  Surgeon: Lisbeth Noriega Jr., MD;  Location: [facility];  Service: Pain Management;  Laterality: Bilateral;    INJECTION OF ANESTHETIC AGENT AROUND MEDIAL BRANCH NERVES INNERVATING LUMBAR FACET JOINT Bilateral 9/3/2020    Procedure: Block-nerve-medial branch-lumbar--Bilateral L4-5, L5-S1;  Surgeon: Lisbeth Noriega Jr., MD;  Location: [facility];  Service: Pain Management;  Laterality: Bilateral;       Family History   Problem Relation Age of Onset    Depression Mother     Hypertension Mother     Depression Sister     Hypertension Paternal Grandmother     Diabetes Maternal Uncle     Breast cancer Neg Hx     Ovarian cancer Neg Hx        Social History     Socioeconomic History    Marital status: Single     Spouse name: Not on file    Number of children: Not on file    Years of education: Not on file    Highest education level: Not on file   Occupational History    Not on file   Social Needs    Financial resource strain: Not on file    Food insecurity     Worry: Not on file     Inability: Not on file    Transportation needs     Medical: Not on file     Non-medical: Not on file   Tobacco Use    Smoking status: Current Every Day Smoker     Packs/day: 1.00    Smokeless tobacco: Never Used   Substance and Sexual Activity    Alcohol use: Never     Frequency: Never    Drug use: Not Currently    Sexual activity: Yes     Partners: Male     Birth control/protection: None   Lifestyle    Physical activity     Days per week: Not on file     Minutes per session: Not on file    Stress: To some extent    Relationships    Social connections     Talks on phone: Not on file     Gets together: Not on file     Attends Jainism service: Not on file     Active member of club or organization: Not on file     Attends meetings of clubs or organizations: Not on file     Relationship status: Not on file   Other Topics Concern    Not on file   Social History Narrative    Not on file       Review of patient's allergies indicates:   Allergen Reactions    Penicillins Hives       Physical Exam:  There were no vitals filed for this visit.  There was no PE done for this virtual visit.     Imaging:  None    Labs:  BMP  Lab Results   Component Value Date     08/10/2020    K 4.3 08/10/2020     08/10/2020    CO2 26 08/10/2020    BUN 9 08/10/2020    CREATININE 0.8 08/10/2020    CALCIUM 9.3 08/10/2020    ANIONGAP 8 08/10/2020    ESTGFRAFRICA >60.0 08/10/2020    EGFRNONAA >60.0 08/10/2020     Lab Results   Component Value Date     (H) 08/10/2020    AST 55 (H) 08/10/2020    ALKPHOS 92 08/10/2020    BILITOT 0.2 08/10/2020       Assessment:  Problem List Items Addressed This Visit        Neuro    Chronic pain       Orthopedic    Dorsalgia, unspecified    Chronic bilateral low back pain without sciatica       Other    Chronic pain due to trauma      Other Visit Diagnoses     Lumbar spondylosis    -  Primary          8/17/20 - Agnes Alonso is a 35 y.o. female with a history of substance abuse and addiction related to chronic low back pain.  She is presenting today complaining of chronic low back pain the onset of which is associated with a traumatic fall at age 17 years.  She reports suffering a compression fracture near the thoracolumbar junction, but the majority of her pain has been in the low back, between the hips.  Physical examination is most consistent with facet arthropathy at the lowest levels of the lumbar spine.  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  If the  trauma was severe enough to produce compression, it is likely also severe enough to D grade the facet joints as well.  I have recommended a bilateral, diagnostic medial branch block.  Patient was advised of the interventional sequence which requires is to perform to diagnostic blocks prior to the radiofrequency ablation.  The risks and benefits of the interventional sequence were discussed she would like to move forward.    8/27/2020- 35-year-old female presents status post # 1 of 2  bilateral lumbar MBB targeting L4-5 and L5-S1 she is reporting 90% relief for 24 hr and pain slowly returned she reports improved functionality which allowed her to perform her ADLs.     9/8/2020- 34 y/o female presents status post bilateral lumbar MBB 2 of 2 reporting % relief 2-3 days and then pain returned.  Discussed with patient we will now schedule her for a right than left lumbar MBB targeting the above-mentioned levels RFA is have been known to provide sustained relief for 6-18 months.    : Reviewed and consistent with medication use as prescribed.    Treatment Plan:   Procedures: s/f right than left lumbar RFA targeting L4-5 and L5-S1  PT/OT/HEP: Patient advised to perform core strengthening HEP in addition to stretching for maintenance of back health.  Medications:    - Cont Ibuprofen 600 mg TID PRN with Tylenol 1000 mg TID PRN together.   - Avoid opioids and other controlled substances dur to Hx of drug abuse  Labs: reviewed and medications are appropriately dosed for current hepatorenal function.  Imaging:  Previous imaging was reviewed and discussed with the patient today.   Follow Up: RTC 2-3 weeks virtually or in person.     Santana Stone, LIDYA  Interventional Pain Management      Disclaimer: This note was partly generated using dictation software which may occasionally result in transcription errors.

## 2020-09-08 NOTE — H&P (VIEW-ONLY)
[facility] Established  Patient Evaluation  telemedicine Encounter    Telemedicine Bundle:  This visit was completed during the Coronavirus Crisis to enhance patient safety.  The patient location is: patient's home  The chief complaint leading to consultation is: Low back pain   Visit type: Virtual visit with synchronous audio and video  Total time spent with patient: 15 minutes   Each patient to whom he or she provides medical services by telemedicine is:    (1) informed of the relationship between the physician and patient and the respective role of any other health care provider with respect to management of the patient  (2) notified that he or she may decline to receive medical services by telemedicine and may withdraw from such care at any time.      Referred by: Dr Medina Arellano  Reason for referral: Low back pain    CC:   Low back pain      HPI:   Agnes Alonso is a 35 y.o. female who complains of low back pain which she attributes to a severe fall at age 17 yrs when she suffered considerable low back damage.  She reports having crushed vertebra near the thoracolumbar spine, though all of her pain is across the low back between the hips.  She reports a Hx of medication abuse and addiction and states a strong preference to avoid medications that might cause dependency or addiction.      Interval updates    09/08/2020-  35-year-old female presents via virtual visit patient is status post 2 of 2 lumbar MBB targeting L4-5 and L5-S1 reporting % relief patient reports improved functionality specifically being able to perform her activities of daily living with minimal to no pain.     8-- 35-year-old female presents status post #1 of 2  bilateral lumbar MBB targeting L4-5 and L5-S1 reporting 90% relief for 24 hours and then pain returned. Patient reports improved functionality.     Location: low back pain   Onset: at age 17 yrs  Current Pain Score: 3/10  Daily Pain of Range:  3-9/10  Quality: Aching, Dull, Burning, Throbbing, Tight and Deep  Radiation: mid-upper back area  Worsened by: running  Improved by: heat and massage    Previous Therapies:  PT/OT: Yes, completed 3x in the past 18 years.  Last in 2019.  HEP: Denies regular exercise beyond stretching.  Interventions: Denies  Surgery: Denies  Medications:   - NSAIDS:   - MSK Relaxants:   - TCAs:   - SNRIs:   - Topicals:   - Anticonvulsants:  - Opioids:     Current Pain Medications:  1. Ibuprofen 600 mg TID PRN - works well to eliminate/ prevent severe pain     Review of Systems:  Review of Systems   Constitutional: Negative for chills and fever.   HENT: Negative for nosebleeds.    Eyes: Negative for blurred vision.   Respiratory: Negative for hemoptysis.    Cardiovascular: Negative for chest pain and palpitations.   Gastrointestinal: Negative for heartburn and vomiting.   Genitourinary: Negative for hematuria.   Musculoskeletal: Positive for back pain. Negative for myalgias.   Skin: Negative for rash.   Neurological: Negative for tingling, seizures, loss of consciousness and weakness.   Endo/Heme/Allergies: Does not bruise/bleed easily.   Psychiatric/Behavioral: Positive for depression. Negative for hallucinations.       History:    Current Outpatient Medications:     ibuprofen (ADVIL,MOTRIN) 200 MG tablet, Take 200 mg by mouth every 6 (six) hours as needed for Pain., Disp: , Rfl:     valacyclovir (VALTREX) 500 MG tablet, Take 1 tablet (500 mg total) by mouth 2 (two) times daily. (Patient taking differently: Take 500 mg by mouth 2 (two) times daily as needed. ), Disp: 10 tablet, Rfl: 1  No current facility-administered medications for this visit.     Facility-Administered Medications Ordered in Other Visits:     0.9%  NaCl infusion, 500 mL, Intravenous, Continuous, Lisbeth Noriega Jr., MD    0.9%  NaCl infusion, 500 mL, Intravenous, Continuous, Lisbeth Noriega Jr., MD    lidocaine (PF) 10 mg/ml (1%) injection 10 mg, 1 mL,  Intradermal, Once, Lisbeth Noriega Jr., MD    Past Medical History:   Diagnosis Date    Anxiety     Depression     Herpes genitalia        Past Surgical History:   Procedure Laterality Date     SECTION      INJECTION OF ANESTHETIC AGENT AROUND MEDIAL BRANCH NERVES INNERVATING LUMBAR FACET JOINT Bilateral 2020    Procedure: Block-nerve-medial branch-lumbar--Bilateral L4-5, L5-S1;  Surgeon: Lisbeth Noriega Jr., MD;  Location: [facility];  Service: Pain Management;  Laterality: Bilateral;    INJECTION OF ANESTHETIC AGENT AROUND MEDIAL BRANCH NERVES INNERVATING LUMBAR FACET JOINT Bilateral 9/3/2020    Procedure: Block-nerve-medial branch-lumbar--Bilateral L4-5, L5-S1;  Surgeon: Lisbeth Noriega Jr., MD;  Location: [facility];  Service: Pain Management;  Laterality: Bilateral;       Family History   Problem Relation Age of Onset    Depression Mother     Hypertension Mother     Depression Sister     Hypertension Paternal Grandmother     Diabetes Maternal Uncle     Breast cancer Neg Hx     Ovarian cancer Neg Hx        Social History     Socioeconomic History    Marital status: Single     Spouse name: Not on file    Number of children: Not on file    Years of education: Not on file    Highest education level: Not on file   Occupational History    Not on file   Social Needs    Financial resource strain: Not on file    Food insecurity     Worry: Not on file     Inability: Not on file    Transportation needs     Medical: Not on file     Non-medical: Not on file   Tobacco Use    Smoking status: Current Every Day Smoker     Packs/day: 1.00    Smokeless tobacco: Never Used   Substance and Sexual Activity    Alcohol use: Never     Frequency: Never    Drug use: Not Currently    Sexual activity: Yes     Partners: Male     Birth control/protection: None   Lifestyle    Physical activity     Days per week: Not on file     Minutes per session: Not on file    Stress: To some extent    Relationships    Social connections     Talks on phone: Not on file     Gets together: Not on file     Attends Mandaeism service: Not on file     Active member of club or organization: Not on file     Attends meetings of clubs or organizations: Not on file     Relationship status: Not on file   Other Topics Concern    Not on file   Social History Narrative    Not on file       Review of patient's allergies indicates:   Allergen Reactions    Penicillins Hives       Physical Exam:  There were no vitals filed for this visit.  There was no PE done for this virtual visit.     Imaging:  None    Labs:  BMP  Lab Results   Component Value Date     08/10/2020    K 4.3 08/10/2020     08/10/2020    CO2 26 08/10/2020    BUN 9 08/10/2020    CREATININE 0.8 08/10/2020    CALCIUM 9.3 08/10/2020    ANIONGAP 8 08/10/2020    ESTGFRAFRICA >60.0 08/10/2020    EGFRNONAA >60.0 08/10/2020     Lab Results   Component Value Date     (H) 08/10/2020    AST 55 (H) 08/10/2020    ALKPHOS 92 08/10/2020    BILITOT 0.2 08/10/2020       Assessment:  Problem List Items Addressed This Visit        Neuro    Chronic pain       Orthopedic    Dorsalgia, unspecified    Chronic bilateral low back pain without sciatica       Other    Chronic pain due to trauma      Other Visit Diagnoses     Lumbar spondylosis    -  Primary          8/17/20 - Agnes Alonso is a 35 y.o. female with a history of substance abuse and addiction related to chronic low back pain.  She is presenting today complaining of chronic low back pain the onset of which is associated with a traumatic fall at age 17 years.  She reports suffering a compression fracture near the thoracolumbar junction, but the majority of her pain has been in the low back, between the hips.  Physical examination is most consistent with facet arthropathy at the lowest levels of the lumbar spine.  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  If the  trauma was severe enough to produce compression, it is likely also severe enough to D grade the facet joints as well.  I have recommended a bilateral, diagnostic medial branch block.  Patient was advised of the interventional sequence which requires is to perform to diagnostic blocks prior to the radiofrequency ablation.  The risks and benefits of the interventional sequence were discussed she would like to move forward.    8/27/2020- 35-year-old female presents status post # 1 of 2  bilateral lumbar MBB targeting L4-5 and L5-S1 she is reporting 90% relief for 24 hr and pain slowly returned she reports improved functionality which allowed her to perform her ADLs.     9/8/2020- 34 y/o female presents status post bilateral lumbar MBB 2 of 2 reporting % relief 2-3 days and then pain returned.  Discussed with patient we will now schedule her for a right than left lumbar MBB targeting the above-mentioned levels RFA is have been known to provide sustained relief for 6-18 months.    : Reviewed and consistent with medication use as prescribed.    Treatment Plan:   Procedures: s/f right than left lumbar RFA targeting L4-5 and L5-S1  PT/OT/HEP: Patient advised to perform core strengthening HEP in addition to stretching for maintenance of back health.  Medications:    - Cont Ibuprofen 600 mg TID PRN with Tylenol 1000 mg TID PRN together.   - Avoid opioids and other controlled substances dur to Hx of drug abuse  Labs: reviewed and medications are appropriately dosed for current hepatorenal function.  Imaging:  Previous imaging was reviewed and discussed with the patient today.   Follow Up: RTC 2-3 weeks virtually or in person.     Santana Stone, LIDYA  Interventional Pain Management      Disclaimer: This note was partly generated using dictation software which may occasionally result in transcription errors.

## 2020-09-10 ENCOUNTER — TELEPHONE (OUTPATIENT)
Dept: PAIN MEDICINE | Facility: CLINIC | Age: 35
End: 2020-09-10

## 2020-09-10 DIAGNOSIS — M47.816 LUMBAR SPONDYLOSIS: Primary | ICD-10-CM

## 2020-09-10 RX ORDER — LIDOCAINE HYDROCHLORIDE 10 MG/ML
1 INJECTION, SOLUTION EPIDURAL; INFILTRATION; INTRACAUDAL; PERINEURAL ONCE
Status: CANCELLED | OUTPATIENT
Start: 2020-09-10 | End: 2020-09-10

## 2020-09-10 RX ORDER — SODIUM CHLORIDE 9 MG/ML
500 INJECTION, SOLUTION INTRAVENOUS CONTINUOUS
Status: CANCELLED | OUTPATIENT
Start: 2020-09-10

## 2020-09-10 NOTE — TELEPHONE ENCOUNTER
Pt scheduled for 9/17/20 at 1015 am for Right Lumbar RFA. Pt aware to check in at registration desk on the first floor of the hospital for 0915 am. Pt denied taking blood thinners and is not diabetic. Pt denied having a pacemaker/ICD.

## 2020-09-10 NOTE — TELEPHONE ENCOUNTER
Pt scheduled for 9/24/20 at 0800 am for Left Lumbar RFA. Pt aware to check in at registration desk on the first floor of the hospital for 0700 am. Pt denied taking blood thinners and is not diabetic. Pt denied having a pacemaker/ICD.

## 2020-09-13 ENCOUNTER — TELEPHONE (OUTPATIENT)
Dept: HEPATOLOGY | Facility: CLINIC | Age: 35
End: 2020-09-13

## 2020-09-13 NOTE — TELEPHONE ENCOUNTER
I spoke to the pt and let her know that our Fibroscan machine is currently down so we will need to reschedule her. I went ahead and rescheduled her to the 24th. I told her if she can't make it that day, just call us or message us and we will reschedule it again for her.

## 2020-09-16 NOTE — DISCHARGE INSTRUCTIONS
Home Care Instructions Pain Management:    1.  DIET:    You may resume your normal diet today.    2.  BATHING:    You may shower with luke warm water.    3.  DRESSING:    You may remove your bandage today.    4.  ACTIVITY LEVEL:      You may resume your normal activities 24 hours after your procedure.    5.  MEDICATIONS:    You may resume your normal medications today.    6.  SPECIAL INSTRUCTIONS:    No heat to the injection site for 24 hours including bath or shower, heating pad, moist heat or hot tubs.    Use an ice pack to the injection site for any pain or discomfort.  Apply ice packs for 20 minute intervals as needed.    If you have received any sedatives by mouth today, you can not drive for 12 hours.    If you have received sedation through an IV, you can not drive for 24 hours.    PLEASE CALL YOUR DOCTOR FOR THE FOLLOWIN.  Redness or swelling around the injection site.  2.  Fever of 101 degrees.  3.  Drainage (pus) from the injection site.  4.  For any continuous bleeding (some dried blood over the incision is normal.)    FOR EMERGENCIES:    If any unusual problems or difficulties occur during clinic hours, call [phone] or dial [phone].    Follow up with with your physician in 2-3 weeks.

## 2020-09-17 ENCOUNTER — HOSPITAL ENCOUNTER (OUTPATIENT)
Facility: HOSPITAL | Age: 35
Discharge: HOME | End: 2020-09-17
Attending: PAIN MEDICINE | Admitting: PAIN MEDICINE
Payer: COMMERCIAL

## 2020-09-17 VITALS
DIASTOLIC BLOOD PRESSURE: 84 MMHG | OXYGEN SATURATION: 100 % | TEMPERATURE: 98 F | HEIGHT: 67 IN | WEIGHT: 135 LBS | RESPIRATION RATE: 16 BRPM | BODY MASS INDEX: 21.19 KG/M2 | SYSTOLIC BLOOD PRESSURE: 125 MMHG | HEART RATE: 77 BPM

## 2020-09-17 DIAGNOSIS — G89.29 CHRONIC PAIN: ICD-10-CM

## 2020-09-17 DIAGNOSIS — M47.816 LUMBAR SPONDYLOSIS: Primary | ICD-10-CM

## 2020-09-17 PROCEDURE — 99153 MOD SED SAME PHYS/QHP EA: CPT | Performed by: PAIN MEDICINE

## 2020-09-17 PROCEDURE — 99152 MOD SED SAME PHYS/QHP 5/>YRS: CPT | Performed by: PAIN MEDICINE

## 2020-09-17 PROCEDURE — 64635 DESTROY LUMB/SAC FACET JNT: CPT | Mod: RT,,, | Performed by: PAIN MEDICINE

## 2020-09-17 PROCEDURE — 25000003 PHARM REV CODE 250: Performed by: PAIN MEDICINE

## 2020-09-17 PROCEDURE — 64635 DESTROY LUMB/SAC FACET JNT: CPT | Performed by: PAIN MEDICINE

## 2020-09-17 PROCEDURE — 64636 DESTROY L/S FACET JNT ADDL: CPT | Performed by: PAIN MEDICINE

## 2020-09-17 PROCEDURE — 64635 PR DESTROY LUMB/SAC FACET JNT: ICD-10-PCS | Mod: RT,,, | Performed by: PAIN MEDICINE

## 2020-09-17 PROCEDURE — 64636 DESTROY L/S FACET JNT ADDL: CPT | Mod: RT,,, | Performed by: PAIN MEDICINE

## 2020-09-17 PROCEDURE — 63600175 PHARM REV CODE 636 W HCPCS: Performed by: PAIN MEDICINE

## 2020-09-17 PROCEDURE — 64636 PR DESTROY L/S FACET JNT ADDL: ICD-10-PCS | Mod: RT,,, | Performed by: PAIN MEDICINE

## 2020-09-17 RX ORDER — METHYLPREDNISOLONE ACETATE 40 MG/ML
INJECTION, SUSPENSION INTRA-ARTICULAR; INTRALESIONAL; INTRAMUSCULAR; SOFT TISSUE
Status: DISCONTINUED | OUTPATIENT
Start: 2020-09-17 | End: 2020-09-17 | Stop reason: HOSPADM

## 2020-09-17 RX ORDER — FENTANYL CITRATE 50 UG/ML
INJECTION, SOLUTION INTRAMUSCULAR; INTRAVENOUS
Status: DISCONTINUED | OUTPATIENT
Start: 2020-09-17 | End: 2020-09-17 | Stop reason: HOSPADM

## 2020-09-17 RX ORDER — LIDOCAINE HYDROCHLORIDE 20 MG/ML
INJECTION, SOLUTION EPIDURAL; INFILTRATION; INTRACAUDAL; PERINEURAL
Status: DISCONTINUED | OUTPATIENT
Start: 2020-09-17 | End: 2020-09-17 | Stop reason: HOSPADM

## 2020-09-17 RX ORDER — SODIUM CHLORIDE 9 MG/ML
500 INJECTION, SOLUTION INTRAVENOUS CONTINUOUS
Status: DISCONTINUED | OUTPATIENT
Start: 2020-09-17 | End: 2020-09-17 | Stop reason: HOSPADM

## 2020-09-17 RX ORDER — BUPIVACAINE HYDROCHLORIDE 2.5 MG/ML
INJECTION, SOLUTION EPIDURAL; INFILTRATION; INTRACAUDAL
Status: DISCONTINUED | OUTPATIENT
Start: 2020-09-17 | End: 2020-09-17 | Stop reason: HOSPADM

## 2020-09-17 RX ORDER — LIDOCAINE HYDROCHLORIDE 10 MG/ML
INJECTION INFILTRATION; PERINEURAL
Status: DISCONTINUED | OUTPATIENT
Start: 2020-09-17 | End: 2020-09-17 | Stop reason: HOSPADM

## 2020-09-17 RX ORDER — SODIUM BICARBONATE 1 MEQ/ML
SYRINGE (ML) INTRAVENOUS
Status: DISCONTINUED | OUTPATIENT
Start: 2020-09-17 | End: 2020-09-17 | Stop reason: HOSPADM

## 2020-09-17 RX ORDER — MIDAZOLAM HYDROCHLORIDE 1 MG/ML
INJECTION INTRAMUSCULAR; INTRAVENOUS
Status: DISCONTINUED | OUTPATIENT
Start: 2020-09-17 | End: 2020-09-17 | Stop reason: HOSPADM

## 2020-09-17 RX ORDER — ACETAMINOPHEN 500 MG
1000 TABLET ORAL EVERY 6 HOURS PRN
Status: ON HOLD | COMMUNITY
End: 2022-12-21 | Stop reason: HOSPADM

## 2020-09-17 RX ORDER — LIDOCAINE HYDROCHLORIDE 10 MG/ML
1 INJECTION, SOLUTION EPIDURAL; INFILTRATION; INTRACAUDAL; PERINEURAL ONCE
Status: DISCONTINUED | OUTPATIENT
Start: 2020-09-17 | End: 2020-09-17 | Stop reason: HOSPADM

## 2020-09-17 RX ADMIN — SODIUM CHLORIDE 500 ML: 0.9 INJECTION, SOLUTION INTRAVENOUS at 09:09

## 2020-09-17 NOTE — DISCHARGE SUMMARY
[facility]  Discharge Note  Short Stay     Admit Date: 9/17/2020    Discharge Date: 9/17/2020     Attending Physician: Lisbeth Noriega Jr, MD    Diagnoses:  Active Hospital Problems    Diagnosis  POA    Chronic pain [G89.29]  Yes      Resolved Hospital Problems   No resolved problems to display.     Discharged Condition: Good     Hospital Course: Patient was admitted for an outpatient interventional pain management procedure and tolerated the procedure well with no complications.     Final Diagnoses: Same as principal problem.     Disposition: Home or Self Care     Follow up/Patient Instructions:    Follow-up Information     Lisbeth Noriega Jr, MD. Go in 2 weeks.    Specialty: Pain Medicine  Why: Post-procedural Follow Up As Scheduled  Contact information:  [address]  [address]  [place] [address] [phone]                   Reconciled Medications:     Medication List      CHANGE how you take these medications    valACYclovir 500 MG tablet  Commonly known as: VALTREX  Take 1 tablet (500 mg total) by mouth 2 (two) times daily.  What changed:   · when to take this  · reasons to take this        CONTINUE taking these medications    acetaminophen 500 MG tablet  Commonly known as: TYLENOL  Take 1,000 mg by mouth every 6 (six) hours as needed for Pain.     ibuprofen 200 MG tablet  Commonly known as: ADVIL,MOTRIN  Take 200 mg by mouth every 6 (six) hours as needed for Pain.           Discharge Procedure Orders (must include Diet, Follow-up, Activity)   Call MD for:  temperature >100.4     Call MD for:  severe uncontrolled pain     Call MD for:  redness, tenderness, or signs of infection (pain, swelling, redness, odor or green/yellow discharge around incision site)     Call MD for:  difficulty breathing or increased cough     Call MD for:  severe persistent headache     Call MD for:  worsening rash     Remove dressing in 24 hours       Lisbeth Noriega Jr, MD  Interventional Pain Medicine /  Anesthesiology

## 2020-09-17 NOTE — PROGRESS NOTES
Patients vital signs normal. IV removed. D/Cinstructions reviewed with patient. Patient verbalized understanding. Patient wheeled to the front via W/C

## 2020-09-17 NOTE — OP NOTE
Procedure Note    Preoperative Diagnosis: Lumbar Spondylosis  Postoperative Diagnosis: Lumbar Spondylosis  Procedure Date: 09/17/2020  Procedure Title:  (1) Right L4-5 and L5-S1 Lumbar Medial Branch Radiofrequency Ablation     (2) Intraoperative Fluoroscopy     (3) IV Moderate Sedation (Midazolam 2 mg, Fentanyl 50 mcg)    Anesthesia: Local    Indications: Agnes Alonso is a 35 y.o. year-old female with a diagnosis of back pain due to lumbar or lumbosacral spondylosis.  The patient had significant relief of the pain with the previous diagnostic nerve blocks.     Findings: Final needle placement consistent with technically sucsessful procedure.     Procedure in Detail: The patients history and physical exam were reviewed.  Informed consent obtained after explaining the procedure and potential complications including local discomfort, infection, headache, temporary or permanent weakness and/or numbness of one or both legs, temporary or permanent paraplegia, heart attack and stroke. The patient agreed to proceed, and written informed consent was obtained.    Patient was brought back to procedure room and placed in a prone position and head resting comfortably in head ring. Prior to the initiation of the procedure, the patient's identity, the site, and the nature of the procedure were verified.  AP and oblique fluoroscopy were used to identify and jessica the junctions between the superior articular processes and transverse processes at the L4 and L5 levels on the Right side.  The Right sacral ala was also identified and marked.  The skin and subcutaneous tissues in these identified areas was anesthetized with 2-3 mL of lidocaine 1% at each level. An 18 gauge 100 mm styletted needle was advanced towards each of these points under fluoroscopic guidance. Once os was contacted, negative aspiration was confirmed. Needle placement was optimized in lateral fluoroscopic views.  Stylettes were removed and probes  inserted.    Sensory stimulation was performed at 50 Hz & 0.4V, generating a pressure sensation. Motor stimulation at 2 Hz & 2 V was negative for muscle contractions in the above mentioned nerve distributions except for local multifidus twitch. One mL of 2% lidocaine was injected at each level prior to lesioning, which was performed for 90 seconds at 80 degrees Centigrade. Once the lesion was complete, needles were withdrawn slight and rotated 180 degrees.  A Second lesion was performed.    One mL from a mixture consisting of 2 mL 0.25% bupivacaine and 40 mg Depomedrol was injected through each probe. The probes were removed with a 1% lidocaine flush.      After the procedure was completed, the patients back was cleaned and bandages were placed at the needle insertion sites.    Estimated blood loss: None    Complications: None     Disposition:  The patient tolerated the procedure well and there were no apparent complications. Vital signs remained stable throughout the procedure. The patient was taken to the recovery area where written discharge instructions for the procedure were given.     Follow-up: RTC as scheduled for clinic eval or next procedure.    Lisbeth Noriega Jr, MD  Interventional Pain Medicine / Anesthesiology

## 2020-09-18 ENCOUNTER — TELEPHONE (OUTPATIENT)
Dept: PAIN MEDICINE | Facility: CLINIC | Age: 35
End: 2020-09-18

## 2020-09-18 NOTE — TELEPHONE ENCOUNTER
----- Message from [place] sent at 9/18/2020  9:12 AM CDT -----  Type:  Needs Medical Advice    Who Called: self  Reason:has some questions about her recent procedures   Would the patient rather a call back or a response via [facility]? call  Best Call Back Number: [phone]  Additional Information: none

## 2020-09-23 NOTE — DISCHARGE INSTRUCTIONS
Home Care Instructions Pain Management:    1.  DIET:    You may resume your normal diet today.    2.  BATHING:    You may shower with luke warm water.    3.  DRESSING:    You may remove your bandage today.    4.  ACTIVITY LEVEL:      You may resume your normal activities 24 hours after your procedure.    5.  MEDICATIONS:    You may resume your normal medications today.    6.  SPECIAL INSTRUCTIONS:    No heat to the injection site for 24 hours including bath or shower, heating pad, moist heat or hot tubs.    Use an ice pack to the injection site for any pain or discomfort.  Apply ice packs for 20 minute intervals as needed.    If you have received any sedatives by mouth today, you can not drive for 12 hours.    If you have received sedation through an IV, you can not drive for 24 hours.    PLEASE CALL YOUR DOCTOR FOR THE FOLLOWIN.  Redness or swelling around the injection site.  2.  Fever of 101 degrees.  3.  Drainage (pus) from the injection site.  4.  For any continuous bleeding (some dried blood over the incision is normal.)    FOR EMERGENCIES:    If any unusual problems or difficulties occur during clinic hours, call [phone] or dial [phone].    Follow up with with your physician in 2-3 weeks.

## 2020-09-24 ENCOUNTER — OFFICE VISIT (OUTPATIENT)
Dept: HEPATOLOGY | Facility: CLINIC | Age: 35
End: 2020-09-24
Payer: COMMERCIAL

## 2020-09-24 ENCOUNTER — PROCEDURE VISIT (OUTPATIENT)
Dept: HEPATOLOGY | Facility: CLINIC | Age: 35
End: 2020-09-24
Payer: COMMERCIAL

## 2020-09-24 ENCOUNTER — HOSPITAL ENCOUNTER (OUTPATIENT)
Facility: HOSPITAL | Age: 35
Discharge: HOME | End: 2020-09-24
Attending: PAIN MEDICINE | Admitting: PAIN MEDICINE
Payer: COMMERCIAL

## 2020-09-24 VITALS
HEART RATE: 94 BPM | SYSTOLIC BLOOD PRESSURE: 107 MMHG | BODY MASS INDEX: 20.41 KG/M2 | WEIGHT: 130.06 LBS | HEIGHT: 67 IN | RESPIRATION RATE: 16 BRPM | DIASTOLIC BLOOD PRESSURE: 73 MMHG

## 2020-09-24 VITALS
WEIGHT: 135 LBS | BODY MASS INDEX: 21.19 KG/M2 | DIASTOLIC BLOOD PRESSURE: 81 MMHG | HEART RATE: 89 BPM | OXYGEN SATURATION: 100 % | SYSTOLIC BLOOD PRESSURE: 119 MMHG | TEMPERATURE: 97 F | RESPIRATION RATE: 16 BRPM | HEIGHT: 67 IN

## 2020-09-24 DIAGNOSIS — M47.816 LUMBAR SPONDYLOSIS: Primary | ICD-10-CM

## 2020-09-24 DIAGNOSIS — B18.2 CHRONIC HEPATITIS C WITHOUT HEPATIC COMA: ICD-10-CM

## 2020-09-24 DIAGNOSIS — B18.2 CHRONIC HEPATITIS C WITHOUT HEPATIC COMA: Primary | ICD-10-CM

## 2020-09-24 DIAGNOSIS — G89.29 CHRONIC PAIN: ICD-10-CM

## 2020-09-24 PROCEDURE — 3008F BODY MASS INDEX DOCD: CPT | Mod: CPTII,S$GLB,, | Performed by: PHYSICIAN ASSISTANT

## 2020-09-24 PROCEDURE — 64636 PR DESTROY L/S FACET JNT ADDL: ICD-10-PCS | Mod: LT,,, | Performed by: PAIN MEDICINE

## 2020-09-24 PROCEDURE — 99999 PR PBB SHADOW E&M-EST. PATIENT-LVL IV: CPT | Mod: PBBFAC,,, | Performed by: PHYSICIAN ASSISTANT

## 2020-09-24 PROCEDURE — 64636 DESTROY L/S FACET JNT ADDL: CPT | Performed by: PAIN MEDICINE

## 2020-09-24 PROCEDURE — 63600175 PHARM REV CODE 636 W HCPCS: Performed by: PAIN MEDICINE

## 2020-09-24 PROCEDURE — 64635 PR DESTROY LUMB/SAC FACET JNT: ICD-10-PCS | Mod: LT,,, | Performed by: PAIN MEDICINE

## 2020-09-24 PROCEDURE — 99152 MOD SED SAME PHYS/QHP 5/>YRS: CPT | Performed by: PAIN MEDICINE

## 2020-09-24 PROCEDURE — 91200 LIVER ELASTOGRAPHY: CPT | Mod: S$GLB,,, | Performed by: PHYSICIAN ASSISTANT

## 2020-09-24 PROCEDURE — 99153 MOD SED SAME PHYS/QHP EA: CPT | Performed by: PAIN MEDICINE

## 2020-09-24 PROCEDURE — 99999 PR PBB SHADOW E&M-EST. PATIENT-LVL IV: ICD-10-PCS | Mod: PBBFAC,,, | Performed by: PHYSICIAN ASSISTANT

## 2020-09-24 PROCEDURE — 25000003 PHARM REV CODE 250: Performed by: PAIN MEDICINE

## 2020-09-24 PROCEDURE — 64635 DESTROY LUMB/SAC FACET JNT: CPT | Mod: LT,,, | Performed by: PAIN MEDICINE

## 2020-09-24 PROCEDURE — 91200 FIBROSCAN (VIBRATION CONTROLLED TRANSIENT ELASTOGRAPHY): ICD-10-PCS | Mod: S$GLB,,, | Performed by: PHYSICIAN ASSISTANT

## 2020-09-24 PROCEDURE — 3008F PR BODY MASS INDEX (BMI) DOCUMENTED: ICD-10-PCS | Mod: CPTII,S$GLB,, | Performed by: PHYSICIAN ASSISTANT

## 2020-09-24 PROCEDURE — 99213 OFFICE O/P EST LOW 20 MIN: CPT | Mod: S$GLB,,, | Performed by: PHYSICIAN ASSISTANT

## 2020-09-24 PROCEDURE — 64636 DESTROY L/S FACET JNT ADDL: CPT | Mod: LT,,, | Performed by: PAIN MEDICINE

## 2020-09-24 PROCEDURE — 99213 PR OFFICE/OUTPT VISIT, EST, LEVL III, 20-29 MIN: ICD-10-PCS | Mod: S$GLB,,, | Performed by: PHYSICIAN ASSISTANT

## 2020-09-24 PROCEDURE — 64635 DESTROY LUMB/SAC FACET JNT: CPT | Performed by: PAIN MEDICINE

## 2020-09-24 RX ORDER — LIDOCAINE HYDROCHLORIDE 10 MG/ML
1 INJECTION, SOLUTION EPIDURAL; INFILTRATION; INTRACAUDAL; PERINEURAL ONCE
Status: DISCONTINUED | OUTPATIENT
Start: 2020-09-24 | End: 2020-09-24 | Stop reason: HOSPADM

## 2020-09-24 RX ORDER — LIDOCAINE HYDROCHLORIDE 20 MG/ML
INJECTION, SOLUTION EPIDURAL; INFILTRATION; INTRACAUDAL; PERINEURAL
Status: DISCONTINUED | OUTPATIENT
Start: 2020-09-24 | End: 2020-09-24 | Stop reason: HOSPADM

## 2020-09-24 RX ORDER — FENTANYL CITRATE 50 UG/ML
INJECTION, SOLUTION INTRAMUSCULAR; INTRAVENOUS
Status: DISCONTINUED | OUTPATIENT
Start: 2020-09-24 | End: 2020-09-24 | Stop reason: HOSPADM

## 2020-09-24 RX ORDER — SODIUM CHLORIDE 9 MG/ML
500 INJECTION, SOLUTION INTRAVENOUS CONTINUOUS
Status: DISCONTINUED | OUTPATIENT
Start: 2020-09-24 | End: 2020-09-24 | Stop reason: HOSPADM

## 2020-09-24 RX ORDER — LIDOCAINE HYDROCHLORIDE 10 MG/ML
INJECTION INFILTRATION; PERINEURAL
Status: DISCONTINUED | OUTPATIENT
Start: 2020-09-24 | End: 2020-09-24 | Stop reason: HOSPADM

## 2020-09-24 RX ORDER — METHYLPREDNISOLONE ACETATE 40 MG/ML
INJECTION, SUSPENSION INTRA-ARTICULAR; INTRALESIONAL; INTRAMUSCULAR; SOFT TISSUE
Status: DISCONTINUED | OUTPATIENT
Start: 2020-09-24 | End: 2020-09-24 | Stop reason: HOSPADM

## 2020-09-24 RX ORDER — MIDAZOLAM HYDROCHLORIDE 1 MG/ML
INJECTION, SOLUTION INTRAMUSCULAR; INTRAVENOUS
Status: DISCONTINUED | OUTPATIENT
Start: 2020-09-24 | End: 2020-09-24 | Stop reason: HOSPADM

## 2020-09-24 RX ORDER — LEDIPASVIR AND SOFOSBUVIR 90; 400 MG/1; MG/1
1 TABLET, FILM COATED ORAL DAILY
Qty: 28 TABLET | Refills: 1 | Status: SHIPPED | OUTPATIENT
Start: 2020-09-24 | End: 2020-12-30

## 2020-09-24 RX ORDER — SODIUM BICARBONATE 1 MEQ/ML
SYRINGE (ML) INTRAVENOUS
Status: DISCONTINUED | OUTPATIENT
Start: 2020-09-24 | End: 2020-09-24 | Stop reason: HOSPADM

## 2020-09-24 RX ORDER — BUPIVACAINE HYDROCHLORIDE 2.5 MG/ML
INJECTION, SOLUTION EPIDURAL; INFILTRATION; INTRACAUDAL
Status: DISCONTINUED | OUTPATIENT
Start: 2020-09-24 | End: 2020-09-24 | Stop reason: HOSPADM

## 2020-09-24 NOTE — PROGRESS NOTES
HEPATOLOGY VISIT NOTE - HCV clinic    CHIEF COMPLAINT: Hepatitis C     HISTORY: This is a 35 y.o. White female with chronic hepatitis C.    Here for f/u w/ additional labs, imaging, liver staging.   (+) immunity to HAV & HBV   HIV screen neg   No evidence of advanced fibrosis.    Feels well  Motivated to have HCV treated  Denies jaundice, dark urine, hematemesis, melena, slowed mentation, abdominal distention.       HCV history:  Originally diagnosed 1.5 yrs ago when she presented for drug rehab.  Believes her infection is recent as she thinks she screened neg for HCV ~ 6 months prior.   Noted she had normal transaminases (in teens) , 2016, 2017) but now elevated w/ AST 55,   Prior icteric illnesses: None  Risks for HCV:  Blood transfusion - no    IVDA - first use age 31    Intranasal drug use - first use age 17    Tattoos - first one placed age 13  - Treatment naive  - Genotype 1a  - NS5A resistance not known  - HCV RNA >100,000 IU/mL - 2020    Liver staging:  FibroScan today 20: kPa 6.6, F0-1 (, no significant steatosis)    Labs and imaging support fibroscan, reveal well preserved liver function w/ no obvious evidence of advanced fibrosis      Past Medical History:   Diagnosis Date    Anxiety     Arthritis     Chronic hepatitis C     Depression     Digestive disorder     Herpes genitalia     Liver disease        Past Surgical History:   Procedure Laterality Date     SECTION      INJECTION OF ANESTHETIC AGENT AROUND MEDIAL BRANCH NERVES INNERVATING LUMBAR FACET JOINT Bilateral 2020    Procedure: Block-nerve-medial branch-lumbar--Bilateral L4-5, L5-S1;  Surgeon: Lisbeth Noriega Jr., MD;  Location: [facility];  Service: Pain Management;  Laterality: Bilateral;    INJECTION OF ANESTHETIC AGENT AROUND MEDIAL BRANCH NERVES INNERVATING LUMBAR FACET JOINT Bilateral 9/3/2020    Procedure: Block-nerve-medial branch-lumbar--Bilateral L4-5, L5-S1;  Surgeon: Lisbeth WALLIS  Hari Donohue MD;  Location: [facility];  Service: Pain Management;  Laterality: Bilateral;    RADIOFREQUENCY THERMOCOAGULATION Right 9/17/2020    Procedure: RADIOFREQUENCY THERMAL COAGULATION--Right L4-5, L5-S1;  Surgeon: Lisbeth Noriega Jr., MD;  Location: [facility];  Service: Pain Management;  Laterality: Right;       FAMILY HISTORY: Negative for liver disease    SOCIAL HISTORY:   Has 5 year old son  Social History     Tobacco Use   Smoking Status Current Every Day Smoker    Packs/day: 1.00   Smokeless Tobacco Never Used     Alcohol - none now but denies any prior periods of heavy / regular alcohol use  Drugs - prior drug use, s/p rehab      ROS:   No fever, chills, weight loss, fatigue  No chest pain, palpitations, dyspnea, cough  No abdominal pain, nausea, vomiting, GERD  No skin rashes   No headaches  (+) back pain  No lower extremity edema    Physical Exam   Constitutional: She is oriented to person, place, and time and well-developed, well-nourished, and in no distress. No distress.   Eyes: No scleral icterus.   Pulmonary/Chest: Effort normal.   Abdominal: Soft. She exhibits no distension.   Musculoskeletal:         General: No deformity.   Neurological: She is alert and oriented to person, place, and time. Gait normal.   Skin: Skin is warm and dry. No rash noted. She is not diaphoretic.   (+) tattoos   Psychiatric: Mood, memory, affect and judgment normal.   Vitals reviewed.        RECENT LABS:  Lab Results   Component Value Date    WBC 8.00 08/10/2020    HGB 15.3 08/10/2020     08/10/2020     No results found for: INR  Lab Results   Component Value Date    AST 55 (H) 08/10/2020     (H) 08/10/2020    BILITOT 0.2 08/10/2020    ALBUMIN 4.2 08/10/2020    ALKPHOS 92 08/10/2020    CREATININE 0.8 08/10/2020    BUN 9 08/10/2020     08/10/2020    K 4.3 08/10/2020       RECENT IMAGING:  Results for orders placed during the hospital encounter of 08/24/20   US Liver with Doppler (xpd)     Narrative EXAMINATION:  US LIVER WITH DOPPLER    CLINICAL HISTORY:  hepatitis C; Unspecified viral hepatitis C without hepatic coma    TECHNIQUE:  Ultrasound liver with Doppler.  Color and spectral Doppler were performed.    COMPARISON:  None.    FINDINGS:  The liver is normal in size.  The liver demonstrates homogeneous echotexture.  No focal hepatic lesions are seen.    The main portal vein, right portal vein, left portal vein, middle hepatic vein, right hepatic vein, left hepatic vein, SMV, and IVC are patent with proper directional flow.  The main hepatic artery is patent. The umbilical vein is not patent.    No evidence of ascites.      Impression Satisfactory Doppler evaluation of the liver.      Electronically signed by: Linwood Dey MD  Date:    08/24/2020  Time:    11:01         ASSESSMENT:  35 y.o. White female with:  1. CHRONIC HEPATITIS C, GENOTYPE 1a - treatment naive  -- FibroScan today 9/24/20  -- Elevated transaminases  -- (+) Immunity to HAV & HBV    EDUCATION:  Discussed goal of HCV eradication to prevent progression of liver disease.  Discussed use of Harvoni daily x 8 weeks w/ potential side effects of fatigue and headache.     Reviewed limitations on acid suppressant medications due to DDI w/ Harvoni:  -- Antacids, H2 Receptor Antagonist, PPI - Pt not taking  Patient instructed to contact me if experiencing acid related symptoms so further recommendations can be made regarding acid suppression therapy.      Herbal / alternative therapies must be discontinued  Discussed importance of medication adherence and risk of treatment failure / viral resistance if not adherent. Pt has verbalized understanding.      PLAN:  1. Obtain authorization to treat HCV with Harvoni x 8 weeks  -- Rx will be routed to [facility]  -- Patient will notify me of exact treatment start date so appropriate lab f/u can be scheduled.

## 2020-09-24 NOTE — PROGRESS NOTES
MD Arrival Time:0812  Sedation Start Time:0812  Sedation End Time:0836  MD Departure Time:0836

## 2020-09-24 NOTE — OP NOTE
Procedure Note    Preoperative Diagnosis: Lumbar Spondylosis  Postoperative Diagnosis: Lumbar Spondylosis  Procedure Date: 09/24/2020  Procedure Title:  (1) Left L4-5 and L5-S1 Lumbar Medial Branch Radiofrequency Ablation     (2) Intraoperative Fluoroscopy     (3) IV Moderate Sedation (Midazolam 2 mg, Fentanyl 50 mcg)    Anesthesia: Local    Indications: Agnes Alonso is a 35 y.o. year-old female with a diagnosis of back pain due to lumbar or lumbosacral spondylosis.  The patient had significant relief of the pain with the previous diagnostic nerve blocks.     Findings: Final needle placement consistent with technically sucsessful procedure.     Procedure in Detail: The patients history and physical exam were reviewed.  Informed consent obtained after explaining the procedure and potential complications including local discomfort, infection, headache, temporary or permanent weakness and/or numbness of one or both legs, temporary or permanent paraplegia, heart attack and stroke. The patient agreed to proceed, and written informed consent was obtained.    Patient was brought back to procedure room and placed in a prone position and head resting comfortably in head ring. Prior to the initiation of the procedure, the patient's identity, the site, and the nature of the procedure were verified.  AP and oblique fluoroscopy were used to identify and jessica the junctions between the superior articular processes and transverse processes at the L4 and L5 levels on the Left side.  The Left sacral ala was also identified and marked.  The skin and subcutaneous tissues in these identified areas was anesthetized with 2-3 mL of lidocaine 1% at each level.  An 18 gauge 100 mm styletted needle was advanced towards each of these points under fluoroscopic guidance. Once os was contacted, negative aspiration was confirmed. Needle placement was optimized in lateral fluoroscopic views.  Stylettes were removed and probes  inserted.    Sensory stimulation was performed at 50 Hz & 0.4V, generating a pressure sensation. Motor stimulation at 2 Hz & 2 V was negative for muscle contractions in the above mentioned nerve distributions except for local multifidus twitch. One mL of 2% lidocaine was injected at each level prior to lesioning, which was performed for 90 seconds at 80 degrees Centigrade. Once the lesion was complete, needles were withdrawn slight and rotated 180 degrees.  A Second lesion was performed.    One mL from a mixture consisting of 2 mL 0.25% bupivacaine and 40 mg Depomedrol was injected through each probe. The probes were removed with a 1% lidocaine flush.      After the procedure was completed, the patients back was cleaned and bandages were placed at the needle insertion sites.    Estimated blood loss: None    Complications: None     Disposition:  The patient tolerated the procedure well and there were no apparent complications. Vital signs remained stable throughout the procedure. The patient was taken to the recovery area where written discharge instructions for the procedure were given.     Follow-up: RTC as scheduled for clinic eval or next procedure.    Lisbeth Noriega Jr, MD  Interventional Pain Medicine / Anesthesiology

## 2020-09-24 NOTE — DISCHARGE SUMMARY
[facility]  Discharge Note  Short Stay     Admit Date: 9/24/2020    Discharge Date: 9/24/2020     Attending Physician: Lisbeth Noriega Jr, MD    Diagnoses:  Active Hospital Problems    Diagnosis  POA    Chronic pain [G89.29]  Yes      Resolved Hospital Problems   No resolved problems to display.     Discharged Condition: Good     Hospital Course: Patient was admitted for an outpatient interventional pain management procedure and tolerated the procedure well with no complications.     Final Diagnoses: Same as principal problem.     Disposition: Home or Self Care     Follow up/Patient Instructions:    Follow-up Information     Lisbeth Noriega Jr, MD. Go in 2 weeks.    Specialty: Pain Medicine  Why: Post-procedural Follow Up As Scheduled  Contact information:  [address]  [address]  [place] [address] [phone]                   Reconciled Medications:     Medication List      CHANGE how you take these medications    valACYclovir 500 MG tablet  Commonly known as: VALTREX  Take 1 tablet (500 mg total) by mouth 2 (two) times daily.  What changed:   · when to take this  · reasons to take this        CONTINUE taking these medications    acetaminophen 500 MG tablet  Commonly known as: TYLENOL  Take 1,000 mg by mouth every 6 (six) hours as needed for Pain.     ibuprofen 200 MG tablet  Commonly known as: ADVIL,MOTRIN  Take 200 mg by mouth every 6 (six) hours as needed for Pain.           Discharge Procedure Orders (must include Diet, Follow-up, Activity)   Call MD for:  temperature >100.4     Call MD for:  severe uncontrolled pain     Call MD for:  redness, tenderness, or signs of infection (pain, swelling, redness, odor or green/yellow discharge around incision site)     Call MD for:  difficulty breathing or increased cough     Call MD for:  severe persistent headache     Call MD for:  worsening rash     Remove dressing in 24 hours       Lisbeth Noriega Jr, MD  Interventional Pain Medicine /  Anesthesiology

## 2020-09-24 NOTE — PROCEDURES
FibroScan (Vibration Controlled Transient Elastography)    Date/Time: 9/24/2020 2:45 PM  Performed by: Jennifer B. Scheuermann, PA  Authorized by: Jennifer B. Scheuermann, PA     Diagnosis:  HCV    Probe:  M    Universal Protocol: Patient's identity, procedure and site were verified, confirmatory pause was performed.  Discussed procedure including risks and potential complications.  Questions answered.  Patient verbalizes understanding and wishes to proceed with VCTE.     Procedure: After providing explanations of the procedure, patient was placed in the supine position with right arm in maximum abduction to allow optimal exposure of right lateral abdomen.  Patient was briefly assessed, Testing was performed in the mid-axillary location, 50Hz Shear Wave pulses were applied and the resulting Shear Wave and Propagation Speed detected with a 3.5 MHz ultrasonic signal, using the FibroScan probe, Skin to liver capsule distance and liver parenchyma were accessed during the entire examination with the FibroScan probe, Patient was instructed to breathe normally and to abstain from sudden movements during the procedure, allowing for random measurements of liver stiffness. At least 10 Shear Waves were produced, Individual measurements of each Shear Wave were calculated.  Patient tolerated the procedure well with no complications.  Meets discharge criteria as was dismissed.  Rates pain 0 out of 10.  Patient will follow up with ordering provider to review results.      Findings  Median liver stiffness score:  6.6  CAP Reading: dB/m:  179    IQR/med %:  12  Interpretation  Fibrosis interpretation is based on medial liver stiffness - Kilopascal (kPa).    Fibrosis Stage:  F 0-1  Steatosis interpretation is based on controlled attenuation parameter - (dB/m).    Steatosis Grade:  <S1

## 2020-09-25 ENCOUNTER — PATIENT MESSAGE (OUTPATIENT)
Dept: PAIN MEDICINE | Facility: CLINIC | Age: 35
End: 2020-09-25

## 2020-09-28 ENCOUNTER — TELEPHONE (OUTPATIENT)
Dept: PHARMACY | Facility: CLINIC | Age: 35
End: 2020-09-28

## 2020-10-12 NOTE — TELEPHONE ENCOUNTER
DOCUMENTATION ONLY:  [place] prior authorization approved x 12 weeks  Dates: 10/5/20 through 12/28/20  Case ID: [id number]  Claim rejected stating that patient no longer has active coverage. [facility]

## 2020-10-23 ENCOUNTER — PATIENT MESSAGE (OUTPATIENT)
Dept: PHARMACY | Facility: CLINIC | Age: 35
End: 2020-10-23

## 2020-11-06 ENCOUNTER — PATIENT MESSAGE (OUTPATIENT)
Dept: RESEARCH | Facility: OTHER | Age: 35
End: 2020-11-06

## 2020-11-17 ENCOUNTER — HOSPITAL ENCOUNTER (EMERGENCY)
Facility: OTHER | Age: 35
Discharge: HOME | End: 2020-11-17
Attending: EMERGENCY MEDICINE

## 2020-11-17 VITALS
BODY MASS INDEX: 21.14 KG/M2 | WEIGHT: 135 LBS | SYSTOLIC BLOOD PRESSURE: 124 MMHG | TEMPERATURE: 98 F | DIASTOLIC BLOOD PRESSURE: 75 MMHG | HEART RATE: 85 BPM | OXYGEN SATURATION: 100 % | RESPIRATION RATE: 18 BRPM

## 2020-11-17 DIAGNOSIS — N75.0 BARTHOLIN CYST: Primary | ICD-10-CM

## 2020-11-17 LAB
B-HCG UR QL: NEGATIVE
CTP QC/QA: YES

## 2020-11-17 PROCEDURE — 56420 I&D BARTHOLINS GLAND ABSCESS: CPT

## 2020-11-17 PROCEDURE — 25000003 PHARM REV CODE 250: Performed by: STUDENT IN AN ORGANIZED HEALTH CARE EDUCATION/TRAINING PROGRAM

## 2020-11-17 PROCEDURE — 99283 EMERGENCY DEPT VISIT LOW MDM: CPT | Mod: 25

## 2020-11-17 PROCEDURE — 81025 URINE PREGNANCY TEST: CPT | Performed by: EMERGENCY MEDICINE

## 2020-11-17 RX ORDER — KETOROLAC TROMETHAMINE 10 MG/1
10 TABLET, FILM COATED ORAL
Status: COMPLETED | OUTPATIENT
Start: 2020-11-17 | End: 2020-11-17

## 2020-11-17 RX ORDER — LIDOCAINE HYDROCHLORIDE 10 MG/ML
5 INJECTION INFILTRATION; PERINEURAL
Status: COMPLETED | OUTPATIENT
Start: 2020-11-17 | End: 2020-11-17

## 2020-11-17 RX ADMIN — LIDOCAINE HYDROCHLORIDE 5 ML: 10 INJECTION, SOLUTION INFILTRATION; PERINEURAL at 09:11

## 2020-11-17 RX ADMIN — KETOROLAC TROMETHAMINE 10 MG: 10 TABLET, FILM COATED ORAL at 09:11

## 2020-11-17 NOTE — ED NOTES
Pt to ED with c/o vaginal cyst x 1 week. Pt reports cyst on vaginal area. Pt reports pain and discomfort for past couple days. Pt denies vaginal bleeding or discharge. Pt denies urinary symptoms. Pt denies CP, SOB, n/v/d, fever, chills. AAOX4. RR even, unlabored. NAD noted. Pt attached to pulse ox and BP cycled. Will continue to monitor.

## 2020-11-17 NOTE — ED PROVIDER NOTES
Encounter Date: 2020       History     Chief Complaint   Patient presents with    Bartholin's Cyst     x1 week. pt denies fever/drainage     Patient is a 35 year old female with a PMHx of amenorrhea coming in for a Bartholin's cyst that has been present x 1 week. Left side. Swelling has improved but reports concern as it has not completely resolved, and she begain having mild pain yesterday, prompting today's visit. Taken Ibuprofen and Tylenol without relief. She has never had a Bartholin's cyst before. Denies fevers, chills, abdominal pain, vaginal discharge/bleeding or drainage from site.        Review of patient's allergies indicates:   Allergen Reactions    Penicillins Hives     Past Medical History:   Diagnosis Date    Anxiety     Arthritis     Chronic hepatitis C     Depression     Digestive disorder     Herpes genitalia     Liver disease      Past Surgical History:   Procedure Laterality Date     SECTION      INJECTION OF ANESTHETIC AGENT AROUND MEDIAL BRANCH NERVES INNERVATING LUMBAR FACET JOINT Bilateral 2020    Procedure: Block-nerve-medial branch-lumbar--Bilateral L4-5, L5-S1;  Surgeon: Lisbeth Noriega Jr., MD;  Location: [facility] [place];  Service: Pain Management;  Laterality: Bilateral;    INJECTION OF ANESTHETIC AGENT AROUND MEDIAL BRANCH NERVES INNERVATING LUMBAR FACET JOINT Bilateral 9/3/2020    Procedure: Block-nerve-medial branch-lumbar--Bilateral L4-5, L5-S1;  Surgeon: Lisbeth Noriega Jr., MD;  Location: [facility];  Service: Pain Management;  Laterality: Bilateral;    RADIOFREQUENCY THERMOCOAGULATION Right 2020    Procedure: RADIOFREQUENCY THERMAL COAGULATION--Right L4-5, L5-S1;  Surgeon: Lisbeth Noriega Jr., MD;  Location: [facility];  Service: Pain Management;  Laterality: Right;    RADIOFREQUENCY THERMOCOAGULATION Left 2020    Procedure: RADIOFREQUENCY THERMAL COAGULATION--Left L4-5, L5-S1;  Surgeon: Lisbeth Noriega Jr., MD;  Location:  [facility];  Service: Pain Management;  Laterality: Left;     Family History   Problem Relation Age of Onset    Depression Mother     Hypertension Mother     Depression Sister     Hypertension Paternal Grandmother     Diabetes Maternal Uncle     Breast cancer Neg Hx     Ovarian cancer Neg Hx      Social History     Tobacco Use    Smoking status: Current Every Day Smoker     Packs/day: 1.00    Smokeless tobacco: Never Used   Substance Use Topics    Alcohol use: Never     Frequency: Never    Drug use: Not Currently     Review of Systems   Constitutional: Negative for activity change, chills and fever.   HENT: Negative for congestion, ear pain and sore throat.    Eyes: Negative for pain and visual disturbance.   Respiratory: Negative for shortness of breath and stridor.    Cardiovascular: Negative for chest pain and palpitations.   Gastrointestinal: Negative for abdominal pain, nausea and vomiting.   Genitourinary: Positive for vaginal pain (left; vulvar pain). Negative for difficulty urinating, dysuria, flank pain and frequency.   Skin: Negative for color change and rash.   Neurological: Negative for dizziness, syncope and headaches.   Hematological: Negative for adenopathy. Does not bruise/bleed easily.       Physical Exam     Initial Vitals [11/17/20 0912]   BP Pulse Resp Temp SpO2   121/70 (!) 117 18 97.6 °F (36.4 °C) 99 %      MAP       --         Physical Exam    Nursing note and vitals reviewed.  Constitutional: She appears well-developed and well-nourished. She is not diaphoretic. No distress.   HENT:   Head: Normocephalic and atraumatic.   Right Ear: External ear normal.   Left Ear: External ear normal.   Neck: Neck supple.   Cardiovascular: Normal rate, regular rhythm, normal heart sounds and intact distal pulses.   Pulmonary/Chest: Breath sounds normal. No respiratory distress. She has no wheezes. She has no rhonchi. She has no rales.   Abdominal: Soft. She exhibits no distension. There is no  abdominal tenderness. There is no rebound and no guarding.   Genitourinary:    Genitourinary Comments: There is an area of fluctuance at the 4 o'clock position. No erythema or drainage.     Neurological: She is alert and oriented to person, place, and time. GCS score is 15. GCS eye subscore is 4. GCS verbal subscore is 5. GCS motor subscore is 6.   Skin: Skin is warm. Capillary refill takes less than 2 seconds. No rash noted.   Psychiatric: She has a normal mood and affect.         ED Course   I & D - Incision and Drainage    Date/Time: 11/17/2020 11:15 AM  Location procedure was performed: [facility] EMERGENCY DEPARTMENT  Performed by: José Yu MD  Authorized by: Deb Alvares MD   Assisting provider: Deb Alvares MD  Pre-operative diagnosis: Bartholin's gland cyst  Consent Done: Not Needed  Type: cyst  Body area: anogenital  Location details: Bartholin's gland  Anesthesia: local infiltration    Anesthesia:  Local Anesthetic: lidocaine 1% without epinephrine  Anesthetic total: 5 mL  Patient sedated: no  Scalpel size: 11  Incision type: single straight  Complexity: simple  Drainage: bloody and  pus  Drainage amount: moderate  Wound treatment: drain placed  Packing material: Word catheter  Complications: No  Estimated blood loss (mL): 10  Specimens: No  Implants: No  Patient tolerance: Patient tolerated the procedure well with no immediate complications        Labs Reviewed   POCT URINE PREGNANCY          Imaging Results    None          Medical Decision Making:   Initial Assessment:   Patient is a well-appearing, pleasant 35 year old female coming in for vulvar cyst x 1 week. Area of fluctuance at 4 o'clock position. Will I&D and give follow up apt with OBGYN.   Differential Diagnosis:   Bartholin gland cyst, bartholin gland abscess, cellulitis, folliculitis, lipoma, lymphangitis  ED Management:  Toradol PO given for mild pain. I&D performed with expression of bloody and purulent drainage. Word catheter  placed. She tolerated the procedure well without complications; discharged with OBGYN follow up at [facility].                             Clinical Impression:       ICD-10-CM ICD-9-CM   1. Bartholin cyst  N75.0 616.2                          ED Disposition Condition    Discharge Stable        ED Prescriptions     None        Follow-up Information     Follow up With Specialties Details Why Contact Info    [facility] OB/ GYN Obstetrics and Gynecology Schedule an appointment as soon as possible for a visit in 1 week  [address] [place] [id number] [phone]                                       José Yu MD  Resident  11/17/20 1122

## 2020-11-17 NOTE — ED NOTES
Patient rounding complete. Comfort and positioning addressed. Toileting needs addressed. Pain (4/10). Pt remains attached to pulse ox and BP cycled. Bed rails up x2, bed locked and at lowest position, call remains at beside within reach of patient, instructed on use. AAOx4. RR even and unlabored. Pt updated on plan of care. Pt verbalized understanding. Will continue to monitor.

## 2020-11-25 ENCOUNTER — OFFICE VISIT (OUTPATIENT)
Dept: OBSTETRICS AND GYNECOLOGY | Facility: CLINIC | Age: 35
End: 2020-11-25

## 2020-11-25 ENCOUNTER — TELEPHONE (OUTPATIENT)
Dept: OBSTETRICS AND GYNECOLOGY | Facility: CLINIC | Age: 35
End: 2020-11-25

## 2020-11-25 VITALS
BODY MASS INDEX: 20.48 KG/M2 | HEIGHT: 67 IN | WEIGHT: 130.5 LBS | SYSTOLIC BLOOD PRESSURE: 130 MMHG | DIASTOLIC BLOOD PRESSURE: 60 MMHG

## 2020-11-25 DIAGNOSIS — N75.0 BARTHOLIN CYST: Primary | ICD-10-CM

## 2020-11-25 PROCEDURE — 99202 PR OFFICE/OUTPT VISIT, NEW, LEVL II, 15-29 MIN: ICD-10-PCS | Mod: S$PBB,,, | Performed by: OBSTETRICS & GYNECOLOGY

## 2020-11-25 PROCEDURE — 99213 OFFICE O/P EST LOW 20 MIN: CPT | Mod: PBBFAC | Performed by: OBSTETRICS & GYNECOLOGY

## 2020-11-25 PROCEDURE — 99999 PR PBB SHADOW E&M-EST. PATIENT-LVL III: ICD-10-PCS | Mod: PBBFAC,,, | Performed by: OBSTETRICS & GYNECOLOGY

## 2020-11-25 PROCEDURE — 99999 PR PBB SHADOW E&M-EST. PATIENT-LVL III: CPT | Mod: PBBFAC,,, | Performed by: OBSTETRICS & GYNECOLOGY

## 2020-11-25 PROCEDURE — 99202 OFFICE O/P NEW SF 15 MIN: CPT | Mod: S$PBB,,, | Performed by: OBSTETRICS & GYNECOLOGY

## 2020-11-25 NOTE — TELEPHONE ENCOUNTER
----- Message from Delio Emery, Patient Care Assistant sent at 11/25/2020  8:22 AM CST -----  Name of Who is Calling: MEGAN REEVES [[id number]]    What is the request in detail: Requesting a call back in regards of cycle being down, patient wants to know can she still come in. Please contact to further discuss and advise      Can the clinic reply by [facility]: No    What Number to Call Back if not in [facility]:   [phone]

## 2020-11-25 NOTE — PROGRESS NOTES
Subjective:       Patient ID: Agnes Alonso is a 35 y.o. female.    Chief Complaint:  Follow-up (F/U ER VISIT 20) and Bartholin's Cyst    History of Present Illness  Gynecologic Exam  The patient's pertinent negatives include no genital itching, genital lesions, genital odor, genital rash, missed menses, pelvic pain, vaginal bleeding or vaginal discharge. This is a new problem. The current episode started 1 to 4 weeks ago. The problem occurs daily. The problem has been gradually improving. The pain is moderate. The problem affects the left side. She is not pregnant. Associated symptoms include painful intercourse. Pertinent negatives include no abdominal pain, anorexia, back pain, chills, constipation, diarrhea, discolored urine, dysuria, fever, flank pain, frequency, headaches, hematuria, nausea, rash, urgency or vomiting. The vaginal bleeding is typical of menses. She has been passing clots. She has not been passing tissue. The symptoms are aggravated by tactile pressure. She has tried acetaminophen and NSAIDs for the symptoms. The treatment provided mild relief. She is sexually active. No, her partner does not have an STD. She uses nothing for contraception. Her menstrual history has been irregular. Her past medical history is significant for a  section, herpes simplex and an STD. There is no history of an abdominal surgery, an ectopic pregnancy, endometriosis, a gynecological surgery, menorrhagia, metrorrhagia, miscarriage, ovarian cysts, perineal abscess, PID, a terminated pregnancy or vaginosis.        34 y/o  presents for ED follow up of left bartholin cyst. She was seen on 20 in the ED and was found to have a left bartholin cyst. I&D was performed and word catheter was placed. She states that the catheter fell out that night. She reports no further drainage. The cyst has decreased in size and is not very bothersome. She has never had a bartholin cyst in the past.      GYN & OB History  Patient's last menstrual period was 2020.   Date of Last Pap: 2020    OB History    Para Term  AB Living   1 1   1   1   SAB TAB Ectopic Multiple Live Births         0 1      # Outcome Date GA Lbr Rajat/2nd Weight Sex Delivery Anes PTL Lv   1  05/03/15 35w2d  2.438 kg (5 lb 6 oz) M CS-LTranv Spinal Y HAYDEN      Complications: Breech presentation       Review of Systems  Review of Systems   Constitutional: Negative for chills, diaphoresis, fatigue and fever.   Respiratory: Negative for cough and shortness of breath.    Cardiovascular: Negative for chest pain and palpitations.   Gastrointestinal: Negative for abdominal pain, anorexia, constipation, diarrhea, nausea and vomiting.   Genitourinary: Positive for genital sores. Negative for dysmenorrhea, dyspareunia, dysuria, flank pain, frequency, hematuria, menorrhagia, menstrual problem, missed menses, pelvic pain, urgency, vaginal bleeding, vaginal discharge and vaginal pain.   Musculoskeletal: Negative for back pain.   Integumentary:  Negative for rash.   Neurological: Negative for headaches.   Psychiatric/Behavioral: Negative for depression. The patient is not nervous/anxious.            Objective:    Physical Exam:   Constitutional: She is oriented to person, place, and time. She appears well-developed and well-nourished. No distress.    HENT:   Head: Normocephalic and atraumatic.    Eyes: EOM are normal.    Neck: Normal range of motion.     Pulmonary/Chest: Effort normal.          Genitourinary:    Genitourinary Comments: Incision from I&D healed over, 2 cm left bartholin cyst, otherwise normal external female genitalia; vagina rugated, normal.             Musculoskeletal: Normal range of motion and moves all extremeties.       Neurological: She is alert and oriented to person, place, and time.    Skin: No rash noted.    Psychiatric: She has a normal mood and affect. Her behavior is normal. Judgment and thought  content normal.          Assessment:        1. Bartholin cyst             Plan:      Agnes was seen today for follow-up and bartholin's cyst.    Diagnoses and all orders for this visit:    Bartholin cyst  Patient offered repeat I&D and word catheter placement. She declined today as her symptoms are improved and she has to work for 7 days straight. She will follow up in 2 weeks for possible procedure.   Sitz baths, warm compresses.    No orders of the defined types were placed in this encounter.      Follow up in about 2 weeks (around 12/9/2020) for followup.

## 2020-12-10 ENCOUNTER — OFFICE VISIT (OUTPATIENT)
Dept: OBSTETRICS AND GYNECOLOGY | Facility: CLINIC | Age: 35
End: 2020-12-10

## 2020-12-10 VITALS
HEIGHT: 67 IN | SYSTOLIC BLOOD PRESSURE: 112 MMHG | DIASTOLIC BLOOD PRESSURE: 82 MMHG | BODY MASS INDEX: 19.82 KG/M2 | WEIGHT: 126.31 LBS

## 2020-12-10 DIAGNOSIS — N75.0 BARTHOLIN CYST: Primary | ICD-10-CM

## 2020-12-10 PROCEDURE — 99499 UNLISTED E&M SERVICE: CPT | Mod: S$PBB,,, | Performed by: OBSTETRICS & GYNECOLOGY

## 2020-12-10 PROCEDURE — 99213 OFFICE O/P EST LOW 20 MIN: CPT | Mod: PBBFAC | Performed by: OBSTETRICS & GYNECOLOGY

## 2020-12-10 PROCEDURE — 99499 NO LOS: ICD-10-PCS | Mod: S$PBB,,, | Performed by: OBSTETRICS & GYNECOLOGY

## 2020-12-10 PROCEDURE — 56420 I&D BARTHOLINS GLAND ABSCESS: CPT | Mod: S$PBB,,, | Performed by: OBSTETRICS & GYNECOLOGY

## 2020-12-10 PROCEDURE — 56420 PR I&D BARTHOLIN GLAND ABSCESS: ICD-10-PCS | Mod: S$PBB,,, | Performed by: OBSTETRICS & GYNECOLOGY

## 2020-12-10 PROCEDURE — 99999 PR PBB SHADOW E&M-EST. PATIENT-LVL III: CPT | Mod: PBBFAC,,, | Performed by: OBSTETRICS & GYNECOLOGY

## 2020-12-10 PROCEDURE — 56420 I&D BARTHOLINS GLAND ABSCESS: CPT | Mod: PBBFAC | Performed by: OBSTETRICS & GYNECOLOGY

## 2020-12-10 PROCEDURE — 99999 PR PBB SHADOW E&M-EST. PATIENT-LVL III: ICD-10-PCS | Mod: PBBFAC,,, | Performed by: OBSTETRICS & GYNECOLOGY

## 2020-12-10 RX ORDER — HYDROCODONE BITARTRATE AND ACETAMINOPHEN 5; 325 MG/1; MG/1
1 TABLET ORAL EVERY 6 HOURS PRN
Qty: 10 TABLET | Refills: 0 | Status: SHIPPED | OUTPATIENT
Start: 2020-12-10 | End: 2020-12-30

## 2020-12-11 ENCOUNTER — TELEPHONE (OUTPATIENT)
Dept: OBSTETRICS AND GYNECOLOGY | Facility: CLINIC | Age: 35
End: 2020-12-11

## 2020-12-11 NOTE — TELEPHONE ENCOUNTER
Returned pt call, pt stated she cath fell out. Pt stated she only had a spot of blood on her pad from the incision with no pain. Informed pt to monitor it can continue taking her stiz bath. Also informed pt that if she still having pain to give the office a call on Monday.            ----- Message from Christine Mendoza sent at 12/11/2020 11:03 AM CST -----  Pt is calling about her cath that was placed in yesterday it is now visible and close to falling out please contact her at [phone]        Fela LAO

## 2020-12-21 ENCOUNTER — SPECIALTY PHARMACY (OUTPATIENT)
Dept: PHARMACY | Facility: CLINIC | Age: 35
End: 2020-12-21

## 2020-12-21 DIAGNOSIS — B19.20 HEPATITIS C VIRUS INFECTION WITHOUT HEPATIC COMA, UNSPECIFIED CHRONICITY: Primary | ICD-10-CM

## 2020-12-21 NOTE — PROCEDURES
Procedures   BARTHOLINS DUCT CTST/ ABSCESS DRAINAGE AND WORDS CATHETER PLACEMENT:    Agnes Alonso is a 35 y.o. female  presents for drainage of a Bartholins Duct Cyst and for planned insertion of Words Catheter.    The patient was informed of a 15% possible failure rate for I&D of a Bartholins Cyst with Words catheter placement, plus the possibility of spontaneous expulsion of the Words catheter.  The patient was informed of the risk of bleeding, pain, and infection; she was also counseled on the alternatives to I&D with Words catheter placement and agrees to proceed.  Her allergy list was reviewed to confirm no allergy to latex.  A time out was performed.    There is a 4cm Bartholins Duct Cyst/ Abscess of the left vulva.  After betadine swab x 3, the base of the Bartholins duct was injected with 1% lidocaine.  A stab incision was made into the duct with an #11 blade proximal to the hymenal ring.  A small Hemostat was used to widen the incision.  A sterile Words catheter was inserted into the duct, inflated with 3cc of sterile water, and the needle and syringe was removed.      Assessment:  Bartholins Duct  Cyst/ Abscess  Words Catheter Insertion    Post I&D with Words catheter insertion counseling:  The patient was counseled to manage pain with NSAIDs, Tylenol, an prescriptions per the medication card.  She was told to expect drainage from the catheter site and to ease discomfort with sitz baths or drying well with a cool hair dryer.  She was asked to maintain pelvic rest until the catheter is removed and to report foul-smelling discharge, heavy bleeding, fever greater than 101.0F, or worsening pain.  Counseling lasted about 15 minutes; after counseling the patient had no further questions.

## 2020-12-21 NOTE — TELEPHONE ENCOUNTER
Supplemental PA form received from insurance plan. Form and additional documentation faxed back at 3:37 PM on 12/21/20.

## 2020-12-21 NOTE — TELEPHONE ENCOUNTER
Patient now has insurance coverage. PA required for Harvoni. Eugene PA submitted via CM on 12/21 at 9:04 AM (KEY: [id number] - PA Case ID: [id number]).

## 2020-12-30 ENCOUNTER — TELEPHONE (OUTPATIENT)
Dept: HEPATOLOGY | Facility: CLINIC | Age: 35
End: 2020-12-30

## 2020-12-30 ENCOUNTER — SPECIALTY PHARMACY (OUTPATIENT)
Dept: PHARMACY | Facility: CLINIC | Age: 35
End: 2020-12-30

## 2020-12-30 DIAGNOSIS — B18.2 CHRONIC HEPATITIS C WITHOUT HEPATIC COMA: Primary | ICD-10-CM

## 2020-12-30 DIAGNOSIS — B19.20 HEPATITIS C VIRUS INFECTION WITHOUT HEPATIC COMA, UNSPECIFIED CHRONICITY: Primary | ICD-10-CM

## 2020-12-30 DIAGNOSIS — B18.2 CHRONIC HEPATITIS C WITHOUT HEPATIC COMA: ICD-10-CM

## 2020-12-30 RX ORDER — LEDIPASVIR AND SOFOSBUVIR 90; 400 MG/1; MG/1
1 TABLET, FILM COATED ORAL DAILY
Qty: 28 TABLET | Refills: 1 | Status: SHIPPED | OUTPATIENT
Start: 2020-12-30 | End: 2021-03-26 | Stop reason: CLARIF

## 2020-12-30 RX ORDER — LEDIPASVIR AND SOFOSBUVIR 90; 400 MG/1; MG/1
1 TABLET, FILM COATED ORAL DAILY
Qty: 28 TABLET | Refills: 1 | Status: SHIPPED | OUTPATIENT
Start: 2020-12-30 | End: 2020-12-30 | Stop reason: SDUPTHER

## 2020-12-30 NOTE — TELEPHONE ENCOUNTER
----- Message from Carolynn Gutierrez PharmD sent at 12/30/2020  3:04 PM CST -----  Hey there, I did not realize this patient's Harvoni Rx was sent before we went live with our new system. Therefore, the Rx did not route externally to [facility] Please send a new Rx to [facility]. Pharmacy has been added to patient's pharmacy list. Thanks.

## 2020-12-30 NOTE — TELEPHONE ENCOUNTER
[facility] attempted (attempt 1) to contact patient to offer initial consult on Harvoni. NA, LVM for call back. [facility] will f/u if no call back. [facility] messaged provider and staff to send new Rx to [facility] is unable to route Rx externally since the Rx was written before new system went into effect.

## 2020-12-30 NOTE — TELEPHONE ENCOUNTER
Specialty Pharmacy - Initial Clinical Assessment    Specialty Medication Orders Linked to Encounter      Most Recent Value   Medication #1  HARVONI  mg oral tablet (Order#[id number], Rx#)        Subjective    Agnes Alonso is a 35 y.o. female, who is followed by the specialty pharmacy service for management and education.    Recent Encounters     Date Type Provider Description    12/30/2020 Specialty Pharmacy Carolynn Gutierrez PharmD Initial Clinical Assessment    12/21/2020 Specialty Pharmacy Carolynn Gutierrez PharmD Referral Authorization        Clinical call attempts since last clinical assessment   12/30/2020  8:55 PM - Specialty Pharmacy - Clinical Assessment by Carolynn Gutierrez PharmD     Today she received education before her first fill with [facility].    Current Outpatient Medications   Medication Sig    acetaminophen (TYLENOL) 500 MG tablet Take 1,000 mg by mouth every 6 (six) hours as needed for Pain.    HARVONI  mg oral tablet Take 1 tablet by mouth once daily.    ibuprofen (ADVIL,MOTRIN) 200 MG tablet Take 200 mg by mouth every 6 (six) hours as needed for Pain.    valacyclovir (VALTREX) 500 MG tablet Take 1 tablet (500 mg total) by mouth 2 (two) times daily. (Patient taking differently: Take 500 mg by mouth 2 (two) times daily as needed. )   Last reviewed on 12/21/2020  8:21 AM by Janis Emery MD    Review of patient's allergies indicates:   Allergen Reactions    Penicillins Hives   Last reviewed on  12/30/2020 4:23 PM by Jennifer B Scheuermann    Drug Interactions    Drug interactions evaluated: yes  Clinically relevant drug interactions identified: no  Provided the patient with educational material regarding drug interactions: not applicable         Adverse Effects    *All other systems reviewed and are negative       Assessment Questions - Documented Responses      Most Recent Value   Assessment   Medication Reconciliation completed for patient  Yes   During  "the past 4 weeks, has patient missed any activities due to condition or medication?  No   During the past 4 weeks, did patient have any of the following urgent care visits?  None   Goals of Therapy Status  Discussed (new start)   Welcome packet contents reviewed and discussed with patient?  No   Assesment completed?  Yes   Plan  Therapy being initiated   Do you need to open a clinical intervention (i-vent)?  No   Do you want to schedule first shipment?  No          Objective    She has a past medical history of Anxiety, Arthritis, Chronic hepatitis C, Depression, Digestive disorder, Herpes genitalia, and Liver disease.    Tried/failed medications: NONE    BP Readings from Last 4 Encounters:   12/10/20 112/82   11/25/20 130/60   11/17/20 124/75   09/24/20 107/73     Ht Readings from Last 4 Encounters:   12/10/20 5' 7" (1.702 m)   11/25/20 5' 7" (1.702 m)   09/24/20 5' 7" (1.702 m)   09/24/20 5' 7" (1.702 m)     Wt Readings from Last 4 Encounters:   12/10/20 57.3 kg (126 lb 5.2 oz)   11/25/20 59.2 kg (130 lb 8.2 oz)   11/17/20 61.2 kg (135 lb)   09/24/20 59 kg (130 lb 1.1 oz)     No results found for: HCVGENOTYPENo results for input(s): CREATININE, ALT, AST, HCVQUALITATI, HCVRNAQUANTP, HEPBSAG, HEPBSAB, HEPBCAB in the last 2160 hours.  The goals of Hepatitis C Virus (HCV) infection treatment include:  · Reducing all-cause mortality and liver-related health adverse consequences, including cirrhosis, end-stage liver disease, and hepatocellular carcinoma  · Achieving virologic cure as evidenced by a sustained virologic response  · Improving or maintaining quality of life  · Maintaining optimal therapy adherence  · Minimizing and managing side effects  · Preventing the transmission of HCV      Goals of Therapy Status: Discussed (new start)    Assessment/Plan  Indication, dosage, appropriateness, effectiveness, safety and convenience of her specialty medication(s) were reviewed today.     Patient Counseling    Counseled " the patient on the following: doses and administration discussed, safe handling, storage, and disposal discussed, possible adverse effects and management discussed, possible drug and prescription drug interactions discussed, possible drug and OTC drug and food interactions discussed, lab monitoring and follow-up discussed, use of contraception discussed, therapeutic rationale discussed, adherence and missed doses discussed       Initial Harvoni 90/400mg consult completed on . Confirmed 2 patient identifiers - name and . Therapy Appropriate.    Patient is required to fill Harvoni through [facility]. Patient and provider are aware. Patient knows to report treatment start date to provider and clinic. Patient also advised to call [facility] when she has 7 doses on hand to ensure shipment is set to receive before running out of doses.     Lab Review:   Diagnosis: Hepatitis C cirus infection without hepatic coma, unspecified chronicity [B19.20]  Weeks: 8 weeks   Genotype: 1a  HCV RNA VL: 155,232 IU/mL (8/10/20)   Fibrosis: FibroScan 20: kPa 6.6, F0-1  CP: A   Renal: eGFR >60 mL/min   Treatment: NAIVE  HBV: HBsAB (+), other serologies (-)   HIV 1/2 Ag/Ag: NEGATIVE (9/3/20)   Appropriate based on AASLD guidelines: Harvoni x 8 weeks appropriate for Treatment-Naive, Genotype 1a Patients Without Cirrhosis and who are HIV-uninfected and whose HCV RNA level is <6 million IU/mL.     Harvoni - Take one tablet by mouth daily x 8 weeks.   Counseling was reviewed:    1. Patient MUST take Harvoni at the SAME time every day.   2. Patient rarely experiences heartburn and does not take any acid suppressants at this time. Patient can take antacids PRN but antacids are to be spaced out at least 4 hours from Harvoni. Patient MUST avoid ALL other acid reducers without consulting with myself or provider first.    3. Side effects include headaches and fatigue.    Headache: Patient may treat with OTC  remedies. If Tylenol is used, dose should  not exceed 2000mg per day.    DDI: Medication list reviewed and potential DDIs addressed. No DDIs or allergies noted. Patient MUST contact myself or provider prior to starting any new OTC, herbal, or prescription drugs to avoid potential DDIs.    Discussed the importance of staying well hydrated while on therapy. Compliance stressed - patient to take missed doses as soon as remembered, but NOT to take 2 doses in one day. Patient will report questions or concerns to myself or practitioner. Patient verbalizes understanding. Consultation included: indication; goals of treatment; administration; storage and handling; side effects; how to handle side effects; the importance of compliance; how to handle missed doses; the importance of laboratory monitoring; the importance of keeping all follow up appointments.  Patient understands to report any medication changes to [facility] and provider. All questions answered and addressed to patients satisfaction.     Tasks added this encounter   No tasks added.   Tasks due within next 3 months   No tasks due.     Carolynn Gutierrez PharmD  [facility]  [address] [place] [id number]  Phone: [phone]  Fax: [phone]

## 2020-12-30 NOTE — TELEPHONE ENCOUNTER
DOCUMENTATION ONLY:   [facility]  Phone: [phone]   Fax: [phone]    Eugene co-pay coupon card information:   RxBIN: [id number]  RxPCN: [id number]  RxGRP: [id number]  ISSUER: ([id number])  ID#: [id number]

## 2020-12-31 ENCOUNTER — TELEPHONE (OUTPATIENT)
Dept: HEPATOLOGY | Facility: CLINIC | Age: 35
End: 2020-12-31

## 2020-12-31 NOTE — TELEPHONE ENCOUNTER
----- Message from Radha Feliciano sent at 12/31/2020 10:43 AM CST -----  Contact: pt  Pt calling to speak with nurse regarding ins       [phone]

## 2020-12-31 NOTE — TELEPHONE ENCOUNTER
I spoke with patient.  She has provided [facility] with new insurance info.  Patient told to contact new insurance provider to make sure visits with hepatology are in network.

## 2021-01-20 ENCOUNTER — PATIENT MESSAGE (OUTPATIENT)
Dept: HEPATOLOGY | Facility: CLINIC | Age: 36
End: 2021-01-20

## 2021-01-20 DIAGNOSIS — B18.2 CHRONIC HEPATITIS C WITHOUT HEPATIC COMA: Primary | ICD-10-CM

## 2021-01-26 ENCOUNTER — PATIENT MESSAGE (OUTPATIENT)
Dept: HEPATOLOGY | Facility: CLINIC | Age: 36
End: 2021-01-26

## 2021-01-27 ENCOUNTER — PATIENT MESSAGE (OUTPATIENT)
Dept: HEPATOLOGY | Facility: CLINIC | Age: 36
End: 2021-01-27

## 2021-01-27 ENCOUNTER — TELEPHONE (OUTPATIENT)
Dept: HEPATOLOGY | Facility: CLINIC | Age: 36
End: 2021-01-27

## 2021-02-01 ENCOUNTER — PATIENT MESSAGE (OUTPATIENT)
Dept: OBSTETRICS AND GYNECOLOGY | Facility: CLINIC | Age: 36
End: 2021-02-01

## 2021-02-10 ENCOUNTER — OFFICE VISIT (OUTPATIENT)
Dept: OBSTETRICS AND GYNECOLOGY | Facility: CLINIC | Age: 36
End: 2021-02-10
Payer: COMMERCIAL

## 2021-02-10 VITALS
HEIGHT: 66 IN | SYSTOLIC BLOOD PRESSURE: 119 MMHG | WEIGHT: 130.75 LBS | BODY MASS INDEX: 21.01 KG/M2 | DIASTOLIC BLOOD PRESSURE: 77 MMHG

## 2021-02-10 DIAGNOSIS — Z31.41 FERTILITY TESTING: Primary | ICD-10-CM

## 2021-02-10 DIAGNOSIS — Z32.02 NEGATIVE PREGNANCY TEST: ICD-10-CM

## 2021-02-10 DIAGNOSIS — N93.9 ABNORMAL UTERINE BLEEDING (AUB): ICD-10-CM

## 2021-02-10 LAB
B-HCG UR QL: NEGATIVE
CTP QC/QA: YES

## 2021-02-10 PROCEDURE — 99999 PR PBB SHADOW E&M-EST. PATIENT-LVL III: ICD-10-PCS | Mod: PBBFAC,,, | Performed by: OBSTETRICS & GYNECOLOGY

## 2021-02-10 PROCEDURE — 99213 PR OFFICE/OUTPT VISIT, EST, LEVL III, 20-29 MIN: ICD-10-PCS | Mod: S$GLB,,, | Performed by: OBSTETRICS & GYNECOLOGY

## 2021-02-10 PROCEDURE — 99213 OFFICE O/P EST LOW 20 MIN: CPT | Mod: S$GLB,,, | Performed by: OBSTETRICS & GYNECOLOGY

## 2021-02-10 PROCEDURE — 3008F BODY MASS INDEX DOCD: CPT | Mod: CPTII,S$GLB,, | Performed by: OBSTETRICS & GYNECOLOGY

## 2021-02-10 PROCEDURE — 3008F PR BODY MASS INDEX (BMI) DOCUMENTED: ICD-10-PCS | Mod: CPTII,S$GLB,, | Performed by: OBSTETRICS & GYNECOLOGY

## 2021-02-10 PROCEDURE — 99999 PR PBB SHADOW E&M-EST. PATIENT-LVL III: CPT | Mod: PBBFAC,,, | Performed by: OBSTETRICS & GYNECOLOGY

## 2021-02-10 PROCEDURE — 1126F PR PAIN SEVERITY QUANTIFIED, NO PAIN PRESENT: ICD-10-PCS | Mod: S$GLB,,, | Performed by: OBSTETRICS & GYNECOLOGY

## 2021-02-10 PROCEDURE — 1126F AMNT PAIN NOTED NONE PRSNT: CPT | Mod: S$GLB,,, | Performed by: OBSTETRICS & GYNECOLOGY

## 2021-02-16 ENCOUNTER — PATIENT MESSAGE (OUTPATIENT)
Dept: OBSTETRICS AND GYNECOLOGY | Facility: CLINIC | Age: 36
End: 2021-02-16

## 2021-02-16 DIAGNOSIS — N93.9 ABNORMAL UTERINE BLEEDING (AUB): Primary | ICD-10-CM

## 2021-02-17 ENCOUNTER — TELEPHONE (OUTPATIENT)
Dept: OBSTETRICS AND GYNECOLOGY | Facility: CLINIC | Age: 36
End: 2021-02-17

## 2021-02-17 ENCOUNTER — LAB VISIT (OUTPATIENT)
Dept: LAB | Facility: OTHER | Age: 36
End: 2021-02-17
Attending: OBSTETRICS & GYNECOLOGY
Payer: COMMERCIAL

## 2021-02-17 DIAGNOSIS — N93.9 ABNORMAL UTERINE BLEEDING (AUB): ICD-10-CM

## 2021-02-17 LAB — TSH SERPL DL<=0.005 MIU/L-ACNC: 1.37 UIU/ML (ref 0.4–4)

## 2021-02-17 PROCEDURE — 84443 ASSAY THYROID STIM HORMONE: CPT

## 2021-02-17 PROCEDURE — 36415 COLL VENOUS BLD VENIPUNCTURE: CPT

## 2021-02-17 PROCEDURE — 84146 ASSAY OF PROLACTIN: CPT

## 2021-02-17 PROCEDURE — 84144 ASSAY OF PROGESTERONE: CPT

## 2021-02-18 ENCOUNTER — PATIENT MESSAGE (OUTPATIENT)
Dept: HEPATOLOGY | Facility: CLINIC | Age: 36
End: 2021-02-18

## 2021-02-18 LAB
PROGEST SERPL-MCNC: 7.5 NG/ML
PROLACTIN SERPL IA-MCNC: 9.4 NG/ML (ref 5.2–26.5)

## 2021-02-26 ENCOUNTER — PATIENT MESSAGE (OUTPATIENT)
Dept: OBSTETRICS AND GYNECOLOGY | Facility: CLINIC | Age: 36
End: 2021-02-26

## 2021-02-26 ENCOUNTER — PATIENT MESSAGE (OUTPATIENT)
Dept: HEPATOLOGY | Facility: CLINIC | Age: 36
End: 2021-02-26

## 2021-03-03 ENCOUNTER — PATIENT MESSAGE (OUTPATIENT)
Dept: OBSTETRICS AND GYNECOLOGY | Facility: CLINIC | Age: 36
End: 2021-03-03

## 2021-03-12 ENCOUNTER — PATIENT MESSAGE (OUTPATIENT)
Dept: OBSTETRICS AND GYNECOLOGY | Facility: CLINIC | Age: 36
End: 2021-03-12

## 2021-03-15 ENCOUNTER — OFFICE VISIT (OUTPATIENT)
Dept: PAIN MEDICINE | Facility: CLINIC | Age: 36
End: 2021-03-15
Payer: COMMERCIAL

## 2021-03-15 VITALS — SYSTOLIC BLOOD PRESSURE: 111 MMHG | DIASTOLIC BLOOD PRESSURE: 78 MMHG | HEART RATE: 84 BPM

## 2021-03-15 DIAGNOSIS — M54.50 CHRONIC BILATERAL LOW BACK PAIN WITHOUT SCIATICA: ICD-10-CM

## 2021-03-15 DIAGNOSIS — G89.29 CHRONIC BILATERAL LOW BACK PAIN WITHOUT SCIATICA: ICD-10-CM

## 2021-03-15 DIAGNOSIS — M47.816 LUMBAR SPONDYLOSIS: Primary | ICD-10-CM

## 2021-03-15 PROCEDURE — 99214 PR OFFICE/OUTPT VISIT, EST, LEVL IV, 30-39 MIN: ICD-10-PCS | Mod: S$GLB,,, | Performed by: PAIN MEDICINE

## 2021-03-15 PROCEDURE — 99214 OFFICE O/P EST MOD 30 MIN: CPT | Mod: S$GLB,,, | Performed by: PAIN MEDICINE

## 2021-03-15 PROCEDURE — 1125F AMNT PAIN NOTED PAIN PRSNT: CPT | Mod: S$GLB,,, | Performed by: PAIN MEDICINE

## 2021-03-15 PROCEDURE — 99999 PR PBB SHADOW E&M-EST. PATIENT-LVL III: ICD-10-PCS | Mod: PBBFAC,,, | Performed by: PAIN MEDICINE

## 2021-03-15 PROCEDURE — 99999 PR PBB SHADOW E&M-EST. PATIENT-LVL III: CPT | Mod: PBBFAC,,, | Performed by: PAIN MEDICINE

## 2021-03-15 PROCEDURE — 1125F PR PAIN SEVERITY QUANTIFIED, PAIN PRESENT: ICD-10-PCS | Mod: S$GLB,,, | Performed by: PAIN MEDICINE

## 2021-03-16 ENCOUNTER — TELEPHONE (OUTPATIENT)
Dept: PAIN MEDICINE | Facility: CLINIC | Age: 36
End: 2021-03-16

## 2021-03-16 DIAGNOSIS — M47.816 LUMBAR SPONDYLOSIS: Primary | ICD-10-CM

## 2021-03-22 ENCOUNTER — TELEPHONE (OUTPATIENT)
Dept: PAIN MEDICINE | Facility: CLINIC | Age: 36
End: 2021-03-22

## 2021-03-22 DIAGNOSIS — M47.816 LUMBAR SPONDYLOSIS: Primary | ICD-10-CM

## 2021-03-23 ENCOUNTER — OFFICE VISIT (OUTPATIENT)
Dept: OBSTETRICS AND GYNECOLOGY | Facility: CLINIC | Age: 36
End: 2021-03-23
Payer: COMMERCIAL

## 2021-03-23 VITALS
DIASTOLIC BLOOD PRESSURE: 80 MMHG | SYSTOLIC BLOOD PRESSURE: 120 MMHG | HEIGHT: 66 IN | BODY MASS INDEX: 21.4 KG/M2 | WEIGHT: 133.19 LBS

## 2021-03-23 DIAGNOSIS — Z01.818 PRE-OP EXAM: ICD-10-CM

## 2021-03-23 DIAGNOSIS — N75.0 BARTHOLIN'S GLAND CYST: Primary | ICD-10-CM

## 2021-03-23 PROCEDURE — 1126F PR PAIN SEVERITY QUANTIFIED, NO PAIN PRESENT: ICD-10-PCS | Mod: S$GLB,,, | Performed by: OBSTETRICS & GYNECOLOGY

## 2021-03-23 PROCEDURE — 3008F PR BODY MASS INDEX (BMI) DOCUMENTED: ICD-10-PCS | Mod: CPTII,S$GLB,, | Performed by: OBSTETRICS & GYNECOLOGY

## 2021-03-23 PROCEDURE — 99499 NO LOS: ICD-10-PCS | Mod: S$GLB,,, | Performed by: OBSTETRICS & GYNECOLOGY

## 2021-03-23 PROCEDURE — 3008F BODY MASS INDEX DOCD: CPT | Mod: CPTII,S$GLB,, | Performed by: OBSTETRICS & GYNECOLOGY

## 2021-03-23 PROCEDURE — 1126F AMNT PAIN NOTED NONE PRSNT: CPT | Mod: S$GLB,,, | Performed by: OBSTETRICS & GYNECOLOGY

## 2021-03-23 PROCEDURE — 99999 PR PBB SHADOW E&M-EST. PATIENT-LVL IV: CPT | Mod: PBBFAC,,, | Performed by: OBSTETRICS & GYNECOLOGY

## 2021-03-23 PROCEDURE — 99499 UNLISTED E&M SERVICE: CPT | Mod: S$GLB,,, | Performed by: OBSTETRICS & GYNECOLOGY

## 2021-03-23 PROCEDURE — 99999 PR PBB SHADOW E&M-EST. PATIENT-LVL IV: ICD-10-PCS | Mod: PBBFAC,,, | Performed by: OBSTETRICS & GYNECOLOGY

## 2021-03-23 RX ORDER — SODIUM CHLORIDE 9 MG/ML
INJECTION, SOLUTION INTRAVENOUS CONTINUOUS
Status: CANCELLED | OUTPATIENT
Start: 2021-03-23

## 2021-03-23 RX ORDER — MUPIROCIN 20 MG/G
OINTMENT TOPICAL
Status: CANCELLED | OUTPATIENT
Start: 2021-03-23

## 2021-03-25 ENCOUNTER — HOSPITAL ENCOUNTER (OUTPATIENT)
Facility: HOSPITAL | Age: 36
Discharge: HOME | End: 2021-03-25
Attending: PAIN MEDICINE | Admitting: PAIN MEDICINE
Payer: COMMERCIAL

## 2021-03-25 VITALS
SYSTOLIC BLOOD PRESSURE: 125 MMHG | HEART RATE: 84 BPM | RESPIRATION RATE: 16 BRPM | WEIGHT: 132 LBS | HEIGHT: 67 IN | DIASTOLIC BLOOD PRESSURE: 72 MMHG | TEMPERATURE: 97 F | BODY MASS INDEX: 20.72 KG/M2 | OXYGEN SATURATION: 99 %

## 2021-03-25 DIAGNOSIS — G89.29 CHRONIC PAIN: ICD-10-CM

## 2021-03-25 DIAGNOSIS — M47.816 LUMBAR SPONDYLOSIS: Primary | ICD-10-CM

## 2021-03-25 PROCEDURE — 63600175 PHARM REV CODE 636 W HCPCS: Performed by: PAIN MEDICINE

## 2021-03-25 PROCEDURE — 64635 PR DESTROY LUMB/SAC FACET JNT: ICD-10-PCS | Mod: RT,,, | Performed by: PAIN MEDICINE

## 2021-03-25 PROCEDURE — 99152 MOD SED SAME PHYS/QHP 5/>YRS: CPT | Performed by: PAIN MEDICINE

## 2021-03-25 PROCEDURE — 25000003 PHARM REV CODE 250: Performed by: PAIN MEDICINE

## 2021-03-25 PROCEDURE — 64636 DESTROY L/S FACET JNT ADDL: CPT | Performed by: PAIN MEDICINE

## 2021-03-25 PROCEDURE — 64635 DESTROY LUMB/SAC FACET JNT: CPT | Mod: RT,,, | Performed by: PAIN MEDICINE

## 2021-03-25 PROCEDURE — 64636 PR DESTROY L/S FACET JNT ADDL: ICD-10-PCS | Mod: RT,,, | Performed by: PAIN MEDICINE

## 2021-03-25 PROCEDURE — 64635 DESTROY LUMB/SAC FACET JNT: CPT | Performed by: PAIN MEDICINE

## 2021-03-25 PROCEDURE — 64636 DESTROY L/S FACET JNT ADDL: CPT | Mod: RT,,, | Performed by: PAIN MEDICINE

## 2021-03-25 RX ORDER — SODIUM CHLORIDE 9 MG/ML
INJECTION, SOLUTION INTRAVENOUS CONTINUOUS
Status: DISCONTINUED | OUTPATIENT
Start: 2021-03-25 | End: 2021-03-29

## 2021-03-25 RX ORDER — LIDOCAINE HYDROCHLORIDE 20 MG/ML
INJECTION, SOLUTION EPIDURAL; INFILTRATION; INTRACAUDAL; PERINEURAL
Status: DISCONTINUED | OUTPATIENT
Start: 2021-03-25 | End: 2021-03-25 | Stop reason: HOSPADM

## 2021-03-25 RX ORDER — LIDOCAINE HYDROCHLORIDE 10 MG/ML
INJECTION INFILTRATION; PERINEURAL
Status: DISCONTINUED | OUTPATIENT
Start: 2021-03-25 | End: 2021-03-25 | Stop reason: HOSPADM

## 2021-03-25 RX ORDER — MIDAZOLAM HYDROCHLORIDE 1 MG/ML
INJECTION, SOLUTION INTRAMUSCULAR; INTRAVENOUS
Status: DISCONTINUED | OUTPATIENT
Start: 2021-03-25 | End: 2021-03-25 | Stop reason: HOSPADM

## 2021-03-25 RX ORDER — SODIUM BICARBONATE 1 MEQ/ML
SYRINGE (ML) INTRAVENOUS
Status: DISCONTINUED | OUTPATIENT
Start: 2021-03-25 | End: 2021-03-25 | Stop reason: HOSPADM

## 2021-03-25 RX ORDER — BUPIVACAINE HYDROCHLORIDE 2.5 MG/ML
INJECTION, SOLUTION EPIDURAL; INFILTRATION; INTRACAUDAL
Status: DISCONTINUED | OUTPATIENT
Start: 2021-03-25 | End: 2021-03-25 | Stop reason: HOSPADM

## 2021-03-25 RX ORDER — LIDOCAINE HYDROCHLORIDE 10 MG/ML
1 INJECTION, SOLUTION EPIDURAL; INFILTRATION; INTRACAUDAL; PERINEURAL ONCE
Status: DISCONTINUED | OUTPATIENT
Start: 2021-03-25 | End: 2021-03-29

## 2021-03-25 RX ORDER — METHYLPREDNISOLONE ACETATE 40 MG/ML
INJECTION, SUSPENSION INTRA-ARTICULAR; INTRALESIONAL; INTRAMUSCULAR; SOFT TISSUE
Status: DISCONTINUED | OUTPATIENT
Start: 2021-03-25 | End: 2021-03-25 | Stop reason: HOSPADM

## 2021-03-25 RX ORDER — FENTANYL CITRATE 50 UG/ML
INJECTION, SOLUTION INTRAMUSCULAR; INTRAVENOUS
Status: DISCONTINUED | OUTPATIENT
Start: 2021-03-25 | End: 2021-03-25 | Stop reason: HOSPADM

## 2021-03-26 ENCOUNTER — TELEPHONE (OUTPATIENT)
Dept: OBSTETRICS AND GYNECOLOGY | Facility: CLINIC | Age: 36
End: 2021-03-26

## 2021-03-26 ENCOUNTER — ANESTHESIA EVENT (OUTPATIENT)
Dept: SURGERY | Facility: OTHER | Age: 36
End: 2021-03-26
Payer: COMMERCIAL

## 2021-03-26 ENCOUNTER — HOSPITAL ENCOUNTER (OUTPATIENT)
Dept: PREADMISSION TESTING | Facility: OTHER | Age: 36
Discharge: HOME | End: 2021-03-26
Attending: OBSTETRICS & GYNECOLOGY
Payer: COMMERCIAL

## 2021-03-26 VITALS
HEART RATE: 88 BPM | DIASTOLIC BLOOD PRESSURE: 62 MMHG | BODY MASS INDEX: 20.72 KG/M2 | HEIGHT: 67 IN | OXYGEN SATURATION: 100 % | TEMPERATURE: 97 F | SYSTOLIC BLOOD PRESSURE: 129 MMHG | WEIGHT: 132 LBS

## 2021-03-26 DIAGNOSIS — Z01.818 PRE-OP EXAM: ICD-10-CM

## 2021-03-26 DIAGNOSIS — N75.0 BARTHOLIN'S GLAND CYST: ICD-10-CM

## 2021-03-26 LAB
ABO + RH BLD: NORMAL
BASOPHILS # BLD AUTO: 0.04 K/UL (ref 0–0.2)
BASOPHILS NFR BLD: 0.6 % (ref 0–1.9)
BLD GP AB SCN CELLS X3 SERPL QL: NORMAL
DIFFERENTIAL METHOD: ABNORMAL
EOSINOPHIL # BLD AUTO: 0.2 K/UL (ref 0–0.5)
EOSINOPHIL NFR BLD: 2.4 % (ref 0–8)
ERYTHROCYTE [DISTWIDTH] IN BLOOD BY AUTOMATED COUNT: 11.9 % (ref 11.5–14.5)
HCT VFR BLD AUTO: 44.4 % (ref 37–48.5)
HGB BLD-MCNC: 15 G/DL (ref 12–16)
IMM GRANULOCYTES # BLD AUTO: 0.02 K/UL (ref 0–0.04)
IMM GRANULOCYTES NFR BLD AUTO: 0.3 % (ref 0–0.5)
LYMPHOCYTES # BLD AUTO: 1.6 K/UL (ref 1–4.8)
LYMPHOCYTES NFR BLD: 23.6 % (ref 18–48)
MCH RBC QN AUTO: 32.6 PG (ref 27–31)
MCHC RBC AUTO-ENTMCNC: 33.8 G/DL (ref 32–36)
MCV RBC AUTO: 97 FL (ref 82–98)
MONOCYTES # BLD AUTO: 0.6 K/UL (ref 0.3–1)
MONOCYTES NFR BLD: 9.4 % (ref 4–15)
NEUTROPHILS # BLD AUTO: 4.3 K/UL (ref 1.8–7.7)
NEUTROPHILS NFR BLD: 63.7 % (ref 38–73)
NRBC BLD-RTO: 0 /100 WBC
PLATELET # BLD AUTO: 242 K/UL (ref 150–350)
PMV BLD AUTO: 10.3 FL (ref 9.2–12.9)
RBC # BLD AUTO: 4.6 M/UL (ref 4–5.4)
WBC # BLD AUTO: 6.78 K/UL (ref 3.9–12.7)

## 2021-03-26 PROCEDURE — 85025 COMPLETE CBC W/AUTO DIFF WBC: CPT | Performed by: OBSTETRICS & GYNECOLOGY

## 2021-03-26 PROCEDURE — U0003 INFECTIOUS AGENT DETECTION BY NUCLEIC ACID (DNA OR RNA); SEVERE ACUTE RESPIRATORY SYNDROME CORONAVIRUS 2 (SARS-COV-2) (CORONAVIRUS DISEASE [COVID-19]), AMPLIFIED PROBE TECHNIQUE, MAKING USE OF HIGH THROUGHPUT TECHNOLOGIES AS DESCRIBED BY CMS-2020-01-R: HCPCS | Performed by: OBSTETRICS & GYNECOLOGY

## 2021-03-26 PROCEDURE — 86900 BLOOD TYPING SEROLOGIC ABO: CPT | Performed by: OBSTETRICS & GYNECOLOGY

## 2021-03-26 PROCEDURE — U0005 INFEC AGEN DETEC AMPLI PROBE: HCPCS | Performed by: OBSTETRICS & GYNECOLOGY

## 2021-03-26 PROCEDURE — 36415 COLL VENOUS BLD VENIPUNCTURE: CPT | Performed by: OBSTETRICS & GYNECOLOGY

## 2021-03-26 RX ORDER — ACETAMINOPHEN 500 MG
1000 TABLET ORAL
Status: CANCELLED | OUTPATIENT
Start: 2021-03-26 | End: 2021-03-26

## 2021-03-26 RX ORDER — LIDOCAINE HYDROCHLORIDE 10 MG/ML
0.5 INJECTION, SOLUTION EPIDURAL; INFILTRATION; INTRACAUDAL; PERINEURAL ONCE
Status: CANCELLED | OUTPATIENT
Start: 2021-03-26 | End: 2021-03-26

## 2021-03-26 RX ORDER — SODIUM CHLORIDE, SODIUM LACTATE, POTASSIUM CHLORIDE, CALCIUM CHLORIDE 600; 310; 30; 20 MG/100ML; MG/100ML; MG/100ML; MG/100ML
INJECTION, SOLUTION INTRAVENOUS CONTINUOUS
Status: CANCELLED | OUTPATIENT
Start: 2021-03-26

## 2021-03-26 RX ORDER — LORATADINE 10 MG/1
10 TABLET ORAL DAILY PRN
COMMUNITY
End: 2022-08-18

## 2021-03-27 LAB — SARS-COV-2 RNA RESP QL NAA+PROBE: NOT DETECTED

## 2021-03-29 ENCOUNTER — ANESTHESIA (OUTPATIENT)
Dept: SURGERY | Facility: OTHER | Age: 36
End: 2021-03-29
Payer: COMMERCIAL

## 2021-03-29 ENCOUNTER — HOSPITAL ENCOUNTER (OUTPATIENT)
Facility: OTHER | Age: 36
Discharge: HOME | End: 2021-03-29
Attending: OBSTETRICS & GYNECOLOGY | Admitting: OBSTETRICS & GYNECOLOGY
Payer: COMMERCIAL

## 2021-03-29 VITALS
HEIGHT: 67 IN | HEART RATE: 80 BPM | TEMPERATURE: 98 F | OXYGEN SATURATION: 97 % | DIASTOLIC BLOOD PRESSURE: 61 MMHG | SYSTOLIC BLOOD PRESSURE: 112 MMHG | BODY MASS INDEX: 20.72 KG/M2 | RESPIRATION RATE: 16 BRPM | WEIGHT: 132 LBS

## 2021-03-29 DIAGNOSIS — N75.0 BARTHOLIN'S GLAND CYST: Primary | ICD-10-CM

## 2021-03-29 LAB
B-HCG UR QL: NEGATIVE
CTP QC/QA: YES

## 2021-03-29 PROCEDURE — 71000033 HC RECOVERY, INTIAL HOUR: Performed by: OBSTETRICS & GYNECOLOGY

## 2021-03-29 PROCEDURE — 36000707: Performed by: OBSTETRICS & GYNECOLOGY

## 2021-03-29 PROCEDURE — 25000003 PHARM REV CODE 250: Performed by: OBSTETRICS & GYNECOLOGY

## 2021-03-29 PROCEDURE — 88304 TISSUE EXAM BY PATHOLOGIST: CPT | Mod: 26,,, | Performed by: PATHOLOGY

## 2021-03-29 PROCEDURE — 71000016 HC POSTOP RECOV ADDL HR: Performed by: OBSTETRICS & GYNECOLOGY

## 2021-03-29 PROCEDURE — 88304 TISSUE EXAM BY PATHOLOGIST: CPT | Performed by: PATHOLOGY

## 2021-03-29 PROCEDURE — 81025 URINE PREGNANCY TEST: CPT | Performed by: ANESTHESIOLOGY

## 2021-03-29 PROCEDURE — 56640 VLVCTMY RAD COMP W/LYMPHADEC: CPT | Mod: LT,,, | Performed by: OBSTETRICS & GYNECOLOGY

## 2021-03-29 PROCEDURE — 37000008 HC ANESTHESIA 1ST 15 MINUTES: Performed by: OBSTETRICS & GYNECOLOGY

## 2021-03-29 PROCEDURE — 88342 IMHCHEM/IMCYTCHM 1ST ANTB: CPT | Performed by: PATHOLOGY

## 2021-03-29 PROCEDURE — 56640: ICD-10-PCS | Mod: LT,,, | Performed by: OBSTETRICS & GYNECOLOGY

## 2021-03-29 PROCEDURE — 37000009 HC ANESTHESIA EA ADD 15 MINS: Performed by: OBSTETRICS & GYNECOLOGY

## 2021-03-29 PROCEDURE — 25000003 PHARM REV CODE 250: Performed by: NURSE ANESTHETIST, CERTIFIED REGISTERED

## 2021-03-29 PROCEDURE — 88304 PR  SURG PATH,LEVEL III: ICD-10-PCS | Mod: 26,,, | Performed by: PATHOLOGY

## 2021-03-29 PROCEDURE — 25000003 PHARM REV CODE 250: Performed by: ANESTHESIOLOGY

## 2021-03-29 PROCEDURE — 71000015 HC POSTOP RECOV 1ST HR: Performed by: OBSTETRICS & GYNECOLOGY

## 2021-03-29 PROCEDURE — 88342 IMHCHEM/IMCYTCHM 1ST ANTB: CPT | Mod: 26,,, | Performed by: PATHOLOGY

## 2021-03-29 PROCEDURE — 36000706: Performed by: OBSTETRICS & GYNECOLOGY

## 2021-03-29 PROCEDURE — 63600175 PHARM REV CODE 636 W HCPCS: Performed by: NURSE ANESTHETIST, CERTIFIED REGISTERED

## 2021-03-29 PROCEDURE — 88342 CHG IMMUNOCYTOCHEMISTRY: ICD-10-PCS | Mod: 26,,, | Performed by: PATHOLOGY

## 2021-03-29 RX ORDER — KETOROLAC TROMETHAMINE 30 MG/ML
INJECTION, SOLUTION INTRAMUSCULAR; INTRAVENOUS
Status: DISCONTINUED | OUTPATIENT
Start: 2021-03-29 | End: 2021-03-29

## 2021-03-29 RX ORDER — PROPOFOL 10 MG/ML
VIAL (ML) INTRAVENOUS
Status: DISCONTINUED | OUTPATIENT
Start: 2021-03-29 | End: 2021-03-29

## 2021-03-29 RX ORDER — SODIUM CHLORIDE 9 MG/ML
INJECTION, SOLUTION INTRAVENOUS CONTINUOUS
Status: DISCONTINUED | OUTPATIENT
Start: 2021-03-29 | End: 2021-03-29 | Stop reason: HOSPADM

## 2021-03-29 RX ORDER — FENTANYL CITRATE 50 UG/ML
INJECTION, SOLUTION INTRAMUSCULAR; INTRAVENOUS
Status: DISCONTINUED | OUTPATIENT
Start: 2021-03-29 | End: 2021-03-29

## 2021-03-29 RX ORDER — MIDAZOLAM HYDROCHLORIDE 1 MG/ML
INJECTION INTRAMUSCULAR; INTRAVENOUS
Status: DISCONTINUED | OUTPATIENT
Start: 2021-03-29 | End: 2021-03-29

## 2021-03-29 RX ORDER — SODIUM CHLORIDE, SODIUM LACTATE, POTASSIUM CHLORIDE, CALCIUM CHLORIDE 600; 310; 30; 20 MG/100ML; MG/100ML; MG/100ML; MG/100ML
INJECTION, SOLUTION INTRAVENOUS CONTINUOUS
Status: DISCONTINUED | OUTPATIENT
Start: 2021-03-29 | End: 2021-03-29 | Stop reason: HOSPADM

## 2021-03-29 RX ORDER — MUPIROCIN 20 MG/G
OINTMENT TOPICAL
Status: DISCONTINUED | OUTPATIENT
Start: 2021-03-29 | End: 2021-03-29 | Stop reason: HOSPADM

## 2021-03-29 RX ORDER — LIDOCAINE HYDROCHLORIDE 10 MG/ML
0.5 INJECTION, SOLUTION EPIDURAL; INFILTRATION; INTRACAUDAL; PERINEURAL ONCE
Status: DISCONTINUED | OUTPATIENT
Start: 2021-03-29 | End: 2021-03-29 | Stop reason: HOSPADM

## 2021-03-29 RX ORDER — PHENYLEPHRINE HYDROCHLORIDE 10 MG/ML
INJECTION INTRAVENOUS
Status: DISCONTINUED | OUTPATIENT
Start: 2021-03-29 | End: 2021-03-29

## 2021-03-29 RX ORDER — HYDROMORPHONE HYDROCHLORIDE 2 MG/ML
0.4 INJECTION, SOLUTION INTRAMUSCULAR; INTRAVENOUS; SUBCUTANEOUS EVERY 5 MIN PRN
Status: DISCONTINUED | OUTPATIENT
Start: 2021-03-29 | End: 2021-03-29 | Stop reason: HOSPADM

## 2021-03-29 RX ORDER — IBUPROFEN 600 MG/1
600 TABLET ORAL 3 TIMES DAILY
Qty: 30 TABLET | Refills: 0 | Status: SHIPPED | OUTPATIENT
Start: 2021-03-29 | End: 2022-05-03

## 2021-03-29 RX ORDER — SODIUM CHLORIDE 0.9 % (FLUSH) 0.9 %
3 SYRINGE (ML) INJECTION
Status: DISCONTINUED | OUTPATIENT
Start: 2021-03-29 | End: 2021-03-29 | Stop reason: HOSPADM

## 2021-03-29 RX ORDER — ONDANSETRON 2 MG/ML
4 INJECTION INTRAMUSCULAR; INTRAVENOUS DAILY PRN
Status: DISCONTINUED | OUTPATIENT
Start: 2021-03-29 | End: 2021-03-29 | Stop reason: HOSPADM

## 2021-03-29 RX ORDER — MEPERIDINE HYDROCHLORIDE 25 MG/ML
12.5 INJECTION INTRAMUSCULAR; INTRAVENOUS; SUBCUTANEOUS ONCE AS NEEDED
Status: DISCONTINUED | OUTPATIENT
Start: 2021-03-29 | End: 2021-03-29 | Stop reason: HOSPADM

## 2021-03-29 RX ORDER — OXYCODONE HYDROCHLORIDE 5 MG/1
5 TABLET ORAL
Status: DISCONTINUED | OUTPATIENT
Start: 2021-03-29 | End: 2021-03-29 | Stop reason: HOSPADM

## 2021-03-29 RX ORDER — HYDROCODONE BITARTRATE AND ACETAMINOPHEN 5; 325 MG/1; MG/1
1 TABLET ORAL EVERY 6 HOURS PRN
Qty: 10 TABLET | Refills: 0 | Status: SHIPPED | OUTPATIENT
Start: 2021-03-29 | End: 2022-08-18

## 2021-03-29 RX ORDER — LIDOCAINE HYDROCHLORIDE 20 MG/ML
INJECTION INTRAVENOUS
Status: DISCONTINUED | OUTPATIENT
Start: 2021-03-29 | End: 2021-03-29

## 2021-03-29 RX ORDER — ACETAMINOPHEN 500 MG
1000 TABLET ORAL
Status: COMPLETED | OUTPATIENT
Start: 2021-03-29 | End: 2021-03-29

## 2021-03-29 RX ADMIN — LIDOCAINE HYDROCHLORIDE 50 MG: 20 INJECTION, SOLUTION INTRAVENOUS at 12:03

## 2021-03-29 RX ADMIN — KETOROLAC TROMETHAMINE 30 MG: 30 INJECTION, SOLUTION INTRAMUSCULAR; INTRAVENOUS at 12:03

## 2021-03-29 RX ADMIN — CARBOXYMETHYLCELLULOSE SODIUM 2 DROP: 2.5 SOLUTION/ DROPS OPHTHALMIC at 12:03

## 2021-03-29 RX ADMIN — MIDAZOLAM HYDROCHLORIDE 2 MG: 1 INJECTION, SOLUTION INTRAMUSCULAR; INTRAVENOUS at 12:03

## 2021-03-29 RX ADMIN — OXYCODONE 5 MG: 5 TABLET ORAL at 01:03

## 2021-03-29 RX ADMIN — PHENYLEPHRINE HYDROCHLORIDE 100 MCG: 10 INJECTION INTRAVENOUS at 12:03

## 2021-03-29 RX ADMIN — MUPIROCIN: 20 OINTMENT TOPICAL at 10:03

## 2021-03-29 RX ADMIN — PROPOFOL 180 MG: 10 INJECTION, EMULSION INTRAVENOUS at 12:03

## 2021-03-29 RX ADMIN — FENTANYL CITRATE 50 MCG: 50 INJECTION, SOLUTION INTRAMUSCULAR; INTRAVENOUS at 12:03

## 2021-03-29 RX ADMIN — FENTANYL CITRATE 100 MCG: 50 INJECTION, SOLUTION INTRAMUSCULAR; INTRAVENOUS at 12:03

## 2021-03-29 RX ADMIN — ACETAMINOPHEN 1000 MG: 500 TABLET, FILM COATED ORAL at 10:03

## 2021-03-31 ENCOUNTER — TELEPHONE (OUTPATIENT)
Dept: PRIMARY CARE CLINIC | Facility: CLINIC | Age: 36
End: 2021-03-31

## 2021-03-31 ENCOUNTER — PATIENT MESSAGE (OUTPATIENT)
Dept: PRIMARY CARE CLINIC | Facility: CLINIC | Age: 36
End: 2021-03-31

## 2021-03-31 DIAGNOSIS — Z01.89 LABORATORY TEST: Primary | ICD-10-CM

## 2021-04-01 ENCOUNTER — TELEPHONE (OUTPATIENT)
Dept: OBSTETRICS AND GYNECOLOGY | Facility: CLINIC | Age: 36
End: 2021-04-01

## 2021-04-01 ENCOUNTER — HOSPITAL ENCOUNTER (OUTPATIENT)
Facility: HOSPITAL | Age: 36
Discharge: HOME | End: 2021-04-01
Attending: PAIN MEDICINE | Admitting: PAIN MEDICINE
Payer: COMMERCIAL

## 2021-04-01 VITALS
SYSTOLIC BLOOD PRESSURE: 107 MMHG | TEMPERATURE: 97 F | RESPIRATION RATE: 16 BRPM | BODY MASS INDEX: 20.72 KG/M2 | OXYGEN SATURATION: 99 % | WEIGHT: 132 LBS | HEART RATE: 83 BPM | HEIGHT: 67 IN | DIASTOLIC BLOOD PRESSURE: 71 MMHG

## 2021-04-01 DIAGNOSIS — M47.816 LUMBAR SPONDYLOSIS: Primary | ICD-10-CM

## 2021-04-01 DIAGNOSIS — G89.29 CHRONIC PAIN: ICD-10-CM

## 2021-04-01 LAB
FINAL PATHOLOGIC DIAGNOSIS: NORMAL
GROSS: NORMAL
Lab: NORMAL

## 2021-04-01 PROCEDURE — 25000003 PHARM REV CODE 250: Performed by: PAIN MEDICINE

## 2021-04-01 PROCEDURE — 64636 DESTROY L/S FACET JNT ADDL: CPT | Mod: LT,,, | Performed by: PAIN MEDICINE

## 2021-04-01 PROCEDURE — 99152 MOD SED SAME PHYS/QHP 5/>YRS: CPT | Performed by: PAIN MEDICINE

## 2021-04-01 PROCEDURE — 64636 DESTROY L/S FACET JNT ADDL: CPT | Performed by: PAIN MEDICINE

## 2021-04-01 PROCEDURE — 64635 DESTROY LUMB/SAC FACET JNT: CPT | Mod: LT,,, | Performed by: PAIN MEDICINE

## 2021-04-01 PROCEDURE — 63600175 PHARM REV CODE 636 W HCPCS: Performed by: PAIN MEDICINE

## 2021-04-01 PROCEDURE — 64635 DESTROY LUMB/SAC FACET JNT: CPT | Performed by: PAIN MEDICINE

## 2021-04-01 PROCEDURE — 64635 PR DESTROY LUMB/SAC FACET JNT: ICD-10-PCS | Mod: LT,,, | Performed by: PAIN MEDICINE

## 2021-04-01 PROCEDURE — 64636 PR DESTROY L/S FACET JNT ADDL: ICD-10-PCS | Mod: LT,,, | Performed by: PAIN MEDICINE

## 2021-04-01 RX ORDER — BUPIVACAINE HYDROCHLORIDE 2.5 MG/ML
INJECTION, SOLUTION EPIDURAL; INFILTRATION; INTRACAUDAL
Status: DISCONTINUED | OUTPATIENT
Start: 2021-04-01 | End: 2021-04-01 | Stop reason: HOSPADM

## 2021-04-01 RX ORDER — SODIUM CHLORIDE 9 MG/ML
INJECTION, SOLUTION INTRAVENOUS CONTINUOUS
Status: ACTIVE | OUTPATIENT
Start: 2021-04-01

## 2021-04-01 RX ORDER — LIDOCAINE HYDROCHLORIDE 20 MG/ML
INJECTION, SOLUTION EPIDURAL; INFILTRATION; INTRACAUDAL; PERINEURAL
Status: DISCONTINUED | OUTPATIENT
Start: 2021-04-01 | End: 2021-04-01 | Stop reason: HOSPADM

## 2021-04-01 RX ORDER — METHYLPREDNISOLONE ACETATE 40 MG/ML
INJECTION, SUSPENSION INTRA-ARTICULAR; INTRALESIONAL; INTRAMUSCULAR; SOFT TISSUE
Status: DISCONTINUED | OUTPATIENT
Start: 2021-04-01 | End: 2021-04-01 | Stop reason: HOSPADM

## 2021-04-01 RX ORDER — MIDAZOLAM HYDROCHLORIDE 1 MG/ML
INJECTION, SOLUTION INTRAMUSCULAR; INTRAVENOUS
Status: DISCONTINUED | OUTPATIENT
Start: 2021-04-01 | End: 2021-04-01 | Stop reason: HOSPADM

## 2021-04-01 RX ORDER — FENTANYL CITRATE 50 UG/ML
INJECTION, SOLUTION INTRAMUSCULAR; INTRAVENOUS
Status: DISCONTINUED | OUTPATIENT
Start: 2021-04-01 | End: 2021-04-01 | Stop reason: HOSPADM

## 2021-04-01 RX ORDER — LIDOCAINE HYDROCHLORIDE 10 MG/ML
1 INJECTION, SOLUTION EPIDURAL; INFILTRATION; INTRACAUDAL; PERINEURAL ONCE
Status: ACTIVE | OUTPATIENT
Start: 2021-04-01

## 2021-04-01 RX ORDER — INDOMETHACIN 25 MG/1
CAPSULE ORAL
Status: DISCONTINUED | OUTPATIENT
Start: 2021-04-01 | End: 2021-04-01 | Stop reason: HOSPADM

## 2021-04-01 RX ADMIN — SODIUM CHLORIDE: 0.9 INJECTION, SOLUTION INTRAVENOUS at 08:04

## 2021-04-05 ENCOUNTER — LAB VISIT (OUTPATIENT)
Dept: LAB | Facility: HOSPITAL | Age: 36
End: 2021-04-05
Attending: FAMILY MEDICINE

## 2021-04-05 DIAGNOSIS — Z01.89 LABORATORY TEST: ICD-10-CM

## 2021-04-05 PROCEDURE — 36415 COLL VENOUS BLD VENIPUNCTURE: CPT | Mod: PN | Performed by: FAMILY MEDICINE

## 2021-04-05 PROCEDURE — 86480 TB TEST CELL IMMUN MEASURE: CPT | Performed by: FAMILY MEDICINE

## 2021-04-06 LAB
GAMMA INTERFERON BACKGROUND BLD IA-ACNC: 0.04 IU/ML
M TB IFN-G CD4+ BCKGRND COR BLD-ACNC: 0.01 IU/ML
MITOGEN IGNF BCKGRD COR BLD-ACNC: 9.67 IU/ML
TB GOLD PLUS: NEGATIVE
TB2 - NIL: 0 IU/ML

## 2021-04-12 ENCOUNTER — PATIENT MESSAGE (OUTPATIENT)
Dept: PRIMARY CARE CLINIC | Facility: CLINIC | Age: 36
End: 2021-04-12

## 2021-04-13 ENCOUNTER — OFFICE VISIT (OUTPATIENT)
Dept: OBSTETRICS AND GYNECOLOGY | Facility: CLINIC | Age: 36
End: 2021-04-13
Payer: COMMERCIAL

## 2021-04-13 VITALS
DIASTOLIC BLOOD PRESSURE: 80 MMHG | BODY MASS INDEX: 20 KG/M2 | SYSTOLIC BLOOD PRESSURE: 140 MMHG | WEIGHT: 127.44 LBS | HEIGHT: 67 IN

## 2021-04-13 DIAGNOSIS — Z09 POSTOP CHECK: Primary | ICD-10-CM

## 2021-04-13 PROCEDURE — 3008F BODY MASS INDEX DOCD: CPT | Mod: CPTII,S$GLB,, | Performed by: OBSTETRICS & GYNECOLOGY

## 2021-04-13 PROCEDURE — 3008F PR BODY MASS INDEX (BMI) DOCUMENTED: ICD-10-PCS | Mod: CPTII,S$GLB,, | Performed by: OBSTETRICS & GYNECOLOGY

## 2021-04-13 PROCEDURE — 99024 PR POST-OP FOLLOW-UP VISIT: ICD-10-PCS | Mod: S$GLB,,, | Performed by: OBSTETRICS & GYNECOLOGY

## 2021-04-13 PROCEDURE — 1126F AMNT PAIN NOTED NONE PRSNT: CPT | Mod: S$GLB,,, | Performed by: OBSTETRICS & GYNECOLOGY

## 2021-04-13 PROCEDURE — 99024 POSTOP FOLLOW-UP VISIT: CPT | Mod: S$GLB,,, | Performed by: OBSTETRICS & GYNECOLOGY

## 2021-04-13 PROCEDURE — 99999 PR PBB SHADOW E&M-EST. PATIENT-LVL III: CPT | Mod: PBBFAC,,, | Performed by: OBSTETRICS & GYNECOLOGY

## 2021-04-13 PROCEDURE — 99999 PR PBB SHADOW E&M-EST. PATIENT-LVL III: ICD-10-PCS | Mod: PBBFAC,,, | Performed by: OBSTETRICS & GYNECOLOGY

## 2021-04-13 PROCEDURE — 1126F PR PAIN SEVERITY QUANTIFIED, NO PAIN PRESENT: ICD-10-PCS | Mod: S$GLB,,, | Performed by: OBSTETRICS & GYNECOLOGY

## 2021-04-16 ENCOUNTER — PATIENT MESSAGE (OUTPATIENT)
Dept: RESEARCH | Facility: HOSPITAL | Age: 36
End: 2021-04-16

## 2021-04-22 ENCOUNTER — PATIENT MESSAGE (OUTPATIENT)
Dept: PAIN MEDICINE | Facility: CLINIC | Age: 36
End: 2021-04-22

## 2021-04-22 ENCOUNTER — OFFICE VISIT (OUTPATIENT)
Dept: PAIN MEDICINE | Facility: CLINIC | Age: 36
End: 2021-04-22
Payer: COMMERCIAL

## 2021-04-22 DIAGNOSIS — G89.4 CHRONIC PAIN SYNDROME: ICD-10-CM

## 2021-04-22 DIAGNOSIS — M54.9 DORSALGIA, UNSPECIFIED: ICD-10-CM

## 2021-04-22 DIAGNOSIS — M47.816 LUMBAR SPONDYLOSIS: Primary | ICD-10-CM

## 2021-04-22 DIAGNOSIS — G89.29 CHRONIC BILATERAL LOW BACK PAIN WITHOUT SCIATICA: ICD-10-CM

## 2021-04-22 DIAGNOSIS — M54.50 CHRONIC BILATERAL LOW BACK PAIN WITHOUT SCIATICA: ICD-10-CM

## 2021-04-22 PROCEDURE — 99213 OFFICE O/P EST LOW 20 MIN: CPT | Mod: 95,,, | Performed by: NURSE PRACTITIONER

## 2021-04-22 PROCEDURE — 99213 PR OFFICE/OUTPT VISIT, EST, LEVL III, 20-29 MIN: ICD-10-PCS | Mod: 95,,, | Performed by: NURSE PRACTITIONER

## 2021-07-23 ENCOUNTER — TELEPHONE (OUTPATIENT)
Dept: HEPATOLOGY | Facility: CLINIC | Age: 36
End: 2021-07-23

## 2021-07-23 DIAGNOSIS — B18.2 CHRONIC HEPATITIS C WITHOUT HEPATIC COMA: ICD-10-CM

## 2021-10-05 ENCOUNTER — PATIENT MESSAGE (OUTPATIENT)
Dept: ADMINISTRATIVE | Facility: HOSPITAL | Age: 36
End: 2021-10-05

## 2021-11-18 ENCOUNTER — PATIENT MESSAGE (OUTPATIENT)
Dept: OBSTETRICS AND GYNECOLOGY | Facility: CLINIC | Age: 36
End: 2021-11-18
Payer: MEDICAID

## 2021-11-18 DIAGNOSIS — O03.2 INCOMPLETE MISCARRIAGE WITH BLOOD CLOT: Primary | ICD-10-CM

## 2022-01-18 ENCOUNTER — PATIENT MESSAGE (OUTPATIENT)
Dept: ADMINISTRATIVE | Facility: HOSPITAL | Age: 37
End: 2022-01-18
Payer: MEDICAID

## 2022-04-29 ENCOUNTER — PATIENT MESSAGE (OUTPATIENT)
Dept: OBSTETRICS AND GYNECOLOGY | Facility: CLINIC | Age: 37
End: 2022-04-29
Payer: MEDICAID

## 2022-05-02 ENCOUNTER — TELEPHONE (OUTPATIENT)
Dept: OBSTETRICS AND GYNECOLOGY | Facility: CLINIC | Age: 37
End: 2022-05-02
Payer: MEDICAID

## 2022-05-02 NOTE — TELEPHONE ENCOUNTER
----- Message from Solomon Dinh sent at 5/2/2022  8:05 AM CDT -----  Contact: pt.  .Type:  Needs Medical Advice    Who Called: pt  Would the patient rather a call back or a response via [facility]? Call back  Best Call Back Number: [phone]  Additional Information: Pt. Is 8 weeks pregnant needs to be seen by a doctor  last menstrual 03-07-22.  Would like to be seen because of past miscarriages  Pregnacy confirmation

## 2022-05-03 ENCOUNTER — OFFICE VISIT (OUTPATIENT)
Dept: OBSTETRICS AND GYNECOLOGY | Facility: CLINIC | Age: 37
End: 2022-05-03
Payer: MEDICAID

## 2022-05-03 VITALS
SYSTOLIC BLOOD PRESSURE: 111 MMHG | BODY MASS INDEX: 19.88 KG/M2 | DIASTOLIC BLOOD PRESSURE: 72 MMHG | WEIGHT: 126.88 LBS

## 2022-05-03 DIAGNOSIS — G89.29 CHRONIC LOW BACK PAIN, UNSPECIFIED BACK PAIN LATERALITY, UNSPECIFIED WHETHER SCIATICA PRESENT: ICD-10-CM

## 2022-05-03 DIAGNOSIS — F11.11 H/O OPIOID ABUSE: ICD-10-CM

## 2022-05-03 DIAGNOSIS — Z32.01 POSITIVE URINE PREGNANCY TEST: Primary | ICD-10-CM

## 2022-05-03 DIAGNOSIS — Z72.0 TOBACCO CONSUMPTION: ICD-10-CM

## 2022-05-03 DIAGNOSIS — M54.50 CHRONIC LOW BACK PAIN, UNSPECIFIED BACK PAIN LATERALITY, UNSPECIFIED WHETHER SCIATICA PRESENT: ICD-10-CM

## 2022-05-03 LAB
B-HCG UR QL: POSITIVE
CTP QC/QA: YES

## 2022-05-03 PROCEDURE — 87491 CHLMYD TRACH DNA AMP PROBE: CPT | Mod: 59 | Performed by: OBSTETRICS & GYNECOLOGY

## 2022-05-03 PROCEDURE — 1159F MED LIST DOCD IN RCRD: CPT | Mod: CPTII,,, | Performed by: OBSTETRICS & GYNECOLOGY

## 2022-05-03 PROCEDURE — 99203 OFFICE O/P NEW LOW 30 MIN: CPT | Mod: S$PBB,TH,, | Performed by: OBSTETRICS & GYNECOLOGY

## 2022-05-03 PROCEDURE — 99999 PR PBB SHADOW E&M-EST. PATIENT-LVL III: ICD-10-PCS | Mod: PBBFAC,,, | Performed by: OBSTETRICS & GYNECOLOGY

## 2022-05-03 PROCEDURE — 99203 PR OFFICE/OUTPT VISIT, NEW, LEVL III, 30-44 MIN: ICD-10-PCS | Mod: S$PBB,TH,, | Performed by: OBSTETRICS & GYNECOLOGY

## 2022-05-03 PROCEDURE — 87591 N.GONORRHOEAE DNA AMP PROB: CPT | Mod: 59 | Performed by: OBSTETRICS & GYNECOLOGY

## 2022-05-03 PROCEDURE — 87481 CANDIDA DNA AMP PROBE: CPT | Mod: 59 | Performed by: OBSTETRICS & GYNECOLOGY

## 2022-05-03 PROCEDURE — 3078F PR MOST RECENT DIASTOLIC BLOOD PRESSURE < 80 MM HG: ICD-10-PCS | Mod: CPTII,,, | Performed by: OBSTETRICS & GYNECOLOGY

## 2022-05-03 PROCEDURE — 81025 URINE PREGNANCY TEST: CPT | Mod: PBBFAC,PO | Performed by: OBSTETRICS & GYNECOLOGY

## 2022-05-03 PROCEDURE — 3008F PR BODY MASS INDEX (BMI) DOCUMENTED: ICD-10-PCS | Mod: CPTII,,, | Performed by: OBSTETRICS & GYNECOLOGY

## 2022-05-03 PROCEDURE — 99213 OFFICE O/P EST LOW 20 MIN: CPT | Mod: PBBFAC,TH,PO | Performed by: OBSTETRICS & GYNECOLOGY

## 2022-05-03 PROCEDURE — 3078F DIAST BP <80 MM HG: CPT | Mod: CPTII,,, | Performed by: OBSTETRICS & GYNECOLOGY

## 2022-05-03 PROCEDURE — 99999 PR PBB SHADOW E&M-EST. PATIENT-LVL III: CPT | Mod: PBBFAC,,, | Performed by: OBSTETRICS & GYNECOLOGY

## 2022-05-03 PROCEDURE — 88175 CYTOPATH C/V AUTO FLUID REDO: CPT | Performed by: OBSTETRICS & GYNECOLOGY

## 2022-05-03 PROCEDURE — 87086 URINE CULTURE/COLONY COUNT: CPT | Performed by: OBSTETRICS & GYNECOLOGY

## 2022-05-03 PROCEDURE — 3074F SYST BP LT 130 MM HG: CPT | Mod: CPTII,,, | Performed by: OBSTETRICS & GYNECOLOGY

## 2022-05-03 PROCEDURE — 3074F PR MOST RECENT SYSTOLIC BLOOD PRESSURE < 130 MM HG: ICD-10-PCS | Mod: CPTII,,, | Performed by: OBSTETRICS & GYNECOLOGY

## 2022-05-03 PROCEDURE — 3008F BODY MASS INDEX DOCD: CPT | Mod: CPTII,,, | Performed by: OBSTETRICS & GYNECOLOGY

## 2022-05-03 PROCEDURE — 1159F PR MEDICATION LIST DOCUMENTED IN MEDICAL RECORD: ICD-10-PCS | Mod: CPTII,,, | Performed by: OBSTETRICS & GYNECOLOGY

## 2022-05-03 NOTE — PROGRESS NOTES
CC: +upt/Annual    SUBJECTIVE:   36 y.o. female   for annual routine Pap and checkup. Patient's last menstrual period was 2022 (exact date)..  EGA is 8w1d and EDC is 3/7/22.She has no unusual complaints. Has hx of miscarriage 2021 and hx of marsupialization of bartholin cyst 3/2021. Last pregnancy c-sxn due to breech presentation. Has hx of opioid abuse and is in recovery. Takes kratom OTC as a pain reliever for chronic lower back pain. Smokes 1ppd x >10 years. Plans on cutting down smoking during pregnancy. Hx of HPV+ in , had normal pap smears in the past. Hx of hep c antibody +. ROS otherwise negative.        Past Medical History:   Diagnosis Date    Anxiety     Arthritis     Chronic hepatitis C - LOST TO F/U     s/p Rx but lost to f/u. needs HCV RNA to see if cured. JScheuermann PA-C [facility] 2021    Depression     Digestive disorder     Herpes genitalia      Past Surgical History:   Procedure Laterality Date     SECTION      INJECTION OF ANESTHETIC AGENT AROUND MEDIAL BRANCH NERVES INNERVATING LUMBAR FACET JOINT Bilateral 2020    Procedure: Block-nerve-medial branch-lumbar--Bilateral L4-5, L5-S1;  Surgeon: Lisbeth Noriega Jr., MD;  Location: [facility];  Service: Pain Management;  Laterality: Bilateral;    INJECTION OF ANESTHETIC AGENT AROUND MEDIAL BRANCH NERVES INNERVATING LUMBAR FACET JOINT Bilateral 9/3/2020    Procedure: Block-nerve-medial branch-lumbar--Bilateral L4-5, L5-S1;  Surgeon: Lisbeth Noriega Jr., MD;  Location: [facility];  Service: Pain Management;  Laterality: Bilateral;    MARSUPIALIZATION OF BARTHOLIN'S GLAND CYST N/A 3/29/2021    Procedure: MARSUPIALIZATION, CYST, BARTHOLIN'S GLAND;  Surgeon: Julie R. Jeansonne, MD;  Location[facility];  Service: OB/GYN;  Laterality: N/A;    RADIOFREQUENCY THERMOCOAGULATION Right 2020    Procedure: RADIOFREQUENCY THERMAL COAGULATION--Right L4-5, L5-S1;  Surgeon: Lisbeth Noriega Jr., MD;   Location: [facility];  Service: Pain Management;  Laterality: Right;    RADIOFREQUENCY THERMOCOAGULATION Left 2020    Procedure: RADIOFREQUENCY THERMAL COAGULATION--Left L4-5, L5-S1;  Surgeon: Lisbeth Noriega Jr., MD;  Location: [facility];  Service: Pain Management;  Laterality: Left;    RADIOFREQUENCY THERMOCOAGULATION Right 3/25/2021    Procedure: RADIOFREQUENCY THERMAL COAGULATION--Right L4-5, L5-S1;  Surgeon: Lisbeth Noriega Jr., MD;  Location: [facility];  Service: Pain Management;  Laterality: Right;    RADIOFREQUENCY THERMOCOAGULATION Left 2021    Procedure: RADIOFREQUENCY THERMAL COAGULATION--Left L4-5, L5-S1;  Surgeon: Lisbeth Noriega Jr., MD;  Location: [facility];  Service: Pain Management;  Laterality: Left;     Social History     Socioeconomic History    Marital status:    Tobacco Use    Smoking status: Current Every Day Smoker     Packs/day: 1.00    Smokeless tobacco: Never Used   Substance and Sexual Activity    Alcohol use: Not Currently    Drug use: Never    Sexual activity: Yes     Partners: Male     Birth control/protection: None     Family History   Problem Relation Age of Onset    Depression Mother     Hypertension Mother     Depression Sister     Hypertension Paternal Grandmother     Diabetes Maternal Uncle     Breast cancer Neg Hx     Ovarian cancer Neg Hx      OB History    Para Term  AB Living   2 1   1 1 1   SAB IAB Ectopic Multiple Live Births         0 1      # Outcome Date GA Lbr Rajat/2nd Weight Sex Delivery Anes PTL Lv   2  05/03/15 35w2d  2.438 kg (5 lb 6 oz) M CS-LTranv Spinal Y HAYDEN      Complications: Breech presentation   1 AB                  Current Outpatient Medications   Medication Sig Dispense Refill    prenatal 25/iron fum/folic/dha (PRENATAL-1 ORAL) Take by mouth.      acetaminophen (TYLENOL) 500 MG tablet Take 1,000 mg by mouth every 6 (six) hours as needed for Pain.      HYDROcodone-acetaminophen  (NORCO) 5-325 mg per tablet Take 1 tablet by mouth every 6 (six) hours as needed for Pain. (Patient not taking: Reported on 5/3/2022) 10 tablet 0    loratadine (CLARITIN) 10 mg tablet Take 10 mg by mouth daily as needed for Allergies.      valacyclovir (VALTREX) 500 MG tablet Take 1 tablet (500 mg total) by mouth 2 (two) times daily. (Patient taking differently: Take 500 mg by mouth 2 (two) times daily as needed. ) 10 tablet 1     No current facility-administered medications for this visit.     Facility-Administered Medications Ordered in Other Visits   Medication Dose Route Frequency Provider Last Rate Last Admin    0.9%  NaCl infusion   Intravenous Continuous Lisbeth Noriega Jr., MD 25 mL/hr at 04/01/21 0857 New Bag at 04/01/21 0857    LIDOcaine (PF) 10 mg/ml (1%) injection 10 mg  1 mL Intradermal Once Lisbeth Noriega Jr., MD         Allergies: Penicillins     ROS:  Constitutional: no weight loss, weight gain, fever, fatigue  Eyes:  No vision changes, glasses/contacts  ENT/Mouth: No ulcers, sinus problems, ears ringing, headache  Cardiovascular: No inability to lie flat, chest pain, exercise intolerance, swelling, heart palpitations  Respiratory: No wheezing, coughing blood, shortness of breath, or cough  Gastrointestinal: No diarrhea, bloody stool, nausea/vomiting, constipation, gas, hemorrhoids  Genitourinary: No blood in urine, painful urination, urgency of urination, frequency of urination, incomplete emptying, incontinence, abnormal bleeding, painful periods, heavy periods, vaginal discharge, vaginal odor, painful intercourse, sexual problems, bleeding after intercourse.  Musculoskeletal: No muscle weakness  Skin/Breast: No painful breasts, nipple discharge, masses, rash, ulcers  Neurological: No passing out, seizures, numbness, headache  Endocrine: No diabetes, hypothyroid, hyperthyroid, hot flashes, hair loss, abnormal hair growth, ance  Psychiatric: No depression, crying  Hematologic: No bruises,  bleeding, swollen lymph nodes, anemia.      OBJECTIVE:   The patient appears well, alert, oriented x 3, in no distress.  /72 (BP Location: Left arm, Patient Position: Sitting)   Wt 57.6 kg (126 lb 14.4 oz)   LMP 03/06/2022 (Exact Date)   BMI 19.88 kg/m²   NECK: no thyromegaly, trachea midline  SKIN: no acne, striae, hirsutism  BREAST EXAM: breasts appear normal, no suspicious masses, no skin or nipple changes or axillary nodes  ABDOMEN: soft, non-tender; bowel sounds normal; no masses,  no organomegaly and no hernias, masses, or hepatosplenomegaly  GENITALIA: normal external genitalia, no erythema, no discharge  URETHRA: normal urethra, normal urethral meatus  VAGINA: Normal  CERVIX: no lesions or cervical motion tenderness  UTERUS: normal  ADNEXA: normal adnexa      ASSESSMENT:   1. Positive urine pregnancy test    2. Tobacco consumption    3. Chronic low back pain, unspecified back pain laterality, unspecified whether sciatica present    4. H/O opioid abuse        PLAN:   pap smear  additional lab tests per orders  rtc 1 week after u/s  Orders Placed This Encounter    Vaginosis Screen by DNA Probe    C. trachomatis/N. gonorrhoeae by AMP DNA [facility]; Cervix    Urine culture    US OB <14 Wks TransAbd & TransVag, Single Gestation (XPD)    HEPATITIS C RNA, QUANTITATIVE, PCR    POCT Urine Pregnancy    Liquid-Based Pap Smear, Screening       Discussed: smoking cessation and f/u after u/s for dating  Saw pt with resident and agree with above    Cristofer Montilla DO -3  [facility]

## 2022-05-04 ENCOUNTER — TELEPHONE (OUTPATIENT)
Dept: OBSTETRICS AND GYNECOLOGY | Facility: CLINIC | Age: 37
End: 2022-05-04
Payer: MEDICAID

## 2022-05-04 ENCOUNTER — HOSPITAL ENCOUNTER (OUTPATIENT)
Dept: RADIOLOGY | Facility: HOSPITAL | Age: 37
Discharge: HOME | End: 2022-05-04
Attending: OBSTETRICS & GYNECOLOGY
Payer: MEDICAID

## 2022-05-04 DIAGNOSIS — Z32.01 POSITIVE URINE PREGNANCY TEST: ICD-10-CM

## 2022-05-04 DIAGNOSIS — Z34.90 EARLY STAGE OF PREGNANCY: Primary | ICD-10-CM

## 2022-05-04 LAB
C TRACH DNA SPEC QL NAA+PROBE: NOT DETECTED
N GONORRHOEA DNA SPEC QL NAA+PROBE: NOT DETECTED

## 2022-05-04 PROCEDURE — 76801 OB US < 14 WKS SINGLE FETUS: CPT | Mod: TC

## 2022-05-04 PROCEDURE — 76817 TRANSVAGINAL US OBSTETRIC: CPT | Mod: 26,,, | Performed by: RADIOLOGY

## 2022-05-04 PROCEDURE — 76817 US OB <14 WEEKS, TRANSABDOM & TRANSVAG, SINGLE GESTATION (XPD): ICD-10-PCS | Mod: 26,,, | Performed by: RADIOLOGY

## 2022-05-04 PROCEDURE — 76801 US OB <14 WEEKS, TRANSABDOM & TRANSVAG, SINGLE GESTATION (XPD): ICD-10-PCS | Mod: 26,,, | Performed by: RADIOLOGY

## 2022-05-04 PROCEDURE — 76801 OB US < 14 WKS SINGLE FETUS: CPT | Mod: 26,,, | Performed by: RADIOLOGY

## 2022-05-05 LAB — BACTERIA UR CULT: NO GROWTH

## 2022-05-06 ENCOUNTER — HOSPITAL ENCOUNTER (OUTPATIENT)
Dept: RADIOLOGY | Facility: HOSPITAL | Age: 37
Discharge: HOME | End: 2022-05-06
Attending: OBSTETRICS & GYNECOLOGY
Payer: MEDICAID

## 2022-05-06 ENCOUNTER — TELEPHONE (OUTPATIENT)
Dept: OBSTETRICS AND GYNECOLOGY | Facility: CLINIC | Age: 37
End: 2022-05-06
Payer: MEDICAID

## 2022-05-06 ENCOUNTER — PATIENT MESSAGE (OUTPATIENT)
Dept: OBSTETRICS AND GYNECOLOGY | Facility: CLINIC | Age: 37
End: 2022-05-06
Payer: MEDICAID

## 2022-05-06 DIAGNOSIS — Z34.90 EARLY STAGE OF PREGNANCY: ICD-10-CM

## 2022-05-06 DIAGNOSIS — Z34.90 EARLY STAGE OF PREGNANCY: Primary | ICD-10-CM

## 2022-05-06 PROCEDURE — 76801 OB US < 14 WKS SINGLE FETUS: CPT | Mod: TC,PO

## 2022-05-06 NOTE — TELEPHONE ENCOUNTER
----- Message from Aimee Chu sent at 5/6/2022 12:19 PM CDT -----  Type: Requesting to speak with nurse         Who Called: PT  Regarding: Pt showed up today for US but its not until Monday would like a call back please advise   Would the patient rather a call back or a response via [facility]? Call back  Best Call Back Number: [phone]  Additional Information: n/a

## 2022-05-08 ENCOUNTER — TELEPHONE (OUTPATIENT)
Dept: OBSTETRICS AND GYNECOLOGY | Facility: HOSPITAL | Age: 37
End: 2022-05-08
Payer: MEDICAID

## 2022-05-08 DIAGNOSIS — Z34.90 EARLY STAGE OF PREGNANCY: Primary | ICD-10-CM

## 2022-05-08 LAB
BACTERIAL VAGINOSIS DNA: POSITIVE
CANDIDA GLABRATA DNA: NEGATIVE
CANDIDA KRUSEI DNA: NEGATIVE
CANDIDA RRNA VAG QL PROBE: NEGATIVE
T VAGINALIS RRNA GENITAL QL PROBE: NEGATIVE

## 2022-05-08 RX ORDER — METRONIDAZOLE 500 MG/1
500 TABLET ORAL EVERY 12 HOURS
Qty: 14 TABLET | Refills: 0 | Status: SHIPPED | OUTPATIENT
Start: 2022-05-08 | End: 2022-05-15

## 2022-05-08 NOTE — TELEPHONE ENCOUNTER
Notify pt of BV  Rx sent to pharmacy  Take as directed    Pt notified of u/s results  Will rpt u/s on 5/17 and see me after u/s is done

## 2022-05-17 ENCOUNTER — HOSPITAL ENCOUNTER (OUTPATIENT)
Dept: RADIOLOGY | Facility: OTHER | Age: 37
Discharge: HOME | End: 2022-05-17
Attending: OBSTETRICS & GYNECOLOGY
Payer: MEDICAID

## 2022-05-17 ENCOUNTER — OFFICE VISIT (OUTPATIENT)
Dept: OBSTETRICS AND GYNECOLOGY | Facility: CLINIC | Age: 37
End: 2022-05-17
Payer: MEDICAID

## 2022-05-17 ENCOUNTER — LAB VISIT (OUTPATIENT)
Dept: LAB | Facility: HOSPITAL | Age: 37
End: 2022-05-17
Attending: OBSTETRICS & GYNECOLOGY
Payer: MEDICAID

## 2022-05-17 VITALS — DIASTOLIC BLOOD PRESSURE: 62 MMHG | BODY MASS INDEX: 20.05 KG/M2 | SYSTOLIC BLOOD PRESSURE: 118 MMHG | WEIGHT: 128 LBS

## 2022-05-17 DIAGNOSIS — Z3A.01 7 WEEKS GESTATION OF PREGNANCY: ICD-10-CM

## 2022-05-17 DIAGNOSIS — Z3A.01 7 WEEKS GESTATION OF PREGNANCY: Primary | ICD-10-CM

## 2022-05-17 DIAGNOSIS — Z86.19 HISTORY OF POSITIVE HEPATITIS C: ICD-10-CM

## 2022-05-17 DIAGNOSIS — Z34.90 EARLY STAGE OF PREGNANCY: ICD-10-CM

## 2022-05-17 LAB
ABO + RH BLD: NORMAL
BASOPHILS # BLD AUTO: 0.04 K/UL (ref 0–0.2)
BASOPHILS NFR BLD: 0.5 % (ref 0–1.9)
BLD GP AB SCN CELLS X3 SERPL QL: NORMAL
DIFFERENTIAL METHOD: ABNORMAL
EOSINOPHIL # BLD AUTO: 0.2 K/UL (ref 0–0.5)
EOSINOPHIL NFR BLD: 2.4 % (ref 0–8)
ERYTHROCYTE [DISTWIDTH] IN BLOOD BY AUTOMATED COUNT: 12.3 % (ref 11.5–14.5)
HCT VFR BLD AUTO: 40.5 % (ref 37–48.5)
HGB BLD-MCNC: 13.4 G/DL (ref 12–16)
IMM GRANULOCYTES # BLD AUTO: 0.02 K/UL (ref 0–0.04)
IMM GRANULOCYTES NFR BLD AUTO: 0.2 % (ref 0–0.5)
LYMPHOCYTES # BLD AUTO: 2.8 K/UL (ref 1–4.8)
LYMPHOCYTES NFR BLD: 32.4 % (ref 18–48)
MCH RBC QN AUTO: 31.9 PG (ref 27–31)
MCHC RBC AUTO-ENTMCNC: 33.1 G/DL (ref 32–36)
MCV RBC AUTO: 96 FL (ref 82–98)
MONOCYTES # BLD AUTO: 0.9 K/UL (ref 0.3–1)
MONOCYTES NFR BLD: 10.7 % (ref 4–15)
NEUTROPHILS # BLD AUTO: 4.6 K/UL (ref 1.8–7.7)
NEUTROPHILS NFR BLD: 53.8 % (ref 38–73)
NRBC BLD-RTO: 0 /100 WBC
PLATELET # BLD AUTO: 265 K/UL (ref 150–450)
PMV BLD AUTO: 10.5 FL (ref 9.2–12.9)
RBC # BLD AUTO: 4.2 M/UL (ref 4–5.4)
WBC # BLD AUTO: 8.6 K/UL (ref 3.9–12.7)

## 2022-05-17 PROCEDURE — 3074F PR MOST RECENT SYSTOLIC BLOOD PRESSURE < 130 MM HG: ICD-10-PCS | Mod: CPTII,,, | Performed by: OBSTETRICS & GYNECOLOGY

## 2022-05-17 PROCEDURE — 86592 SYPHILIS TEST NON-TREP QUAL: CPT | Performed by: OBSTETRICS & GYNECOLOGY

## 2022-05-17 PROCEDURE — 99213 PR OFFICE/OUTPT VISIT, EST, LEVL III, 20-29 MIN: ICD-10-PCS | Mod: TH,S$PBB,, | Performed by: OBSTETRICS & GYNECOLOGY

## 2022-05-17 PROCEDURE — 86900 BLOOD TYPING SEROLOGIC ABO: CPT | Performed by: OBSTETRICS & GYNECOLOGY

## 2022-05-17 PROCEDURE — 3074F SYST BP LT 130 MM HG: CPT | Mod: CPTII,,, | Performed by: OBSTETRICS & GYNECOLOGY

## 2022-05-17 PROCEDURE — 1159F MED LIST DOCD IN RCRD: CPT | Mod: CPTII,,, | Performed by: OBSTETRICS & GYNECOLOGY

## 2022-05-17 PROCEDURE — 76817 US OB <14 WEEKS, TRANSABDOM & TRANSVAG, SINGLE GESTATION (XPD): ICD-10-PCS | Mod: 26,,, | Performed by: RADIOLOGY

## 2022-05-17 PROCEDURE — 99213 OFFICE O/P EST LOW 20 MIN: CPT | Mod: TH,S$PBB,, | Performed by: OBSTETRICS & GYNECOLOGY

## 2022-05-17 PROCEDURE — 3008F BODY MASS INDEX DOCD: CPT | Mod: CPTII,,, | Performed by: OBSTETRICS & GYNECOLOGY

## 2022-05-17 PROCEDURE — 99999 PR PBB SHADOW E&M-EST. PATIENT-LVL III: CPT | Mod: PBBFAC,,, | Performed by: OBSTETRICS & GYNECOLOGY

## 2022-05-17 PROCEDURE — 99213 OFFICE O/P EST LOW 20 MIN: CPT | Mod: PBBFAC,25,PO | Performed by: OBSTETRICS & GYNECOLOGY

## 2022-05-17 PROCEDURE — 87389 HIV-1 AG W/HIV-1&-2 AB AG IA: CPT | Performed by: OBSTETRICS & GYNECOLOGY

## 2022-05-17 PROCEDURE — 3008F PR BODY MASS INDEX (BMI) DOCUMENTED: ICD-10-PCS | Mod: CPTII,,, | Performed by: OBSTETRICS & GYNECOLOGY

## 2022-05-17 PROCEDURE — 76801 US OB <14 WEEKS, TRANSABDOM & TRANSVAG, SINGLE GESTATION (XPD): ICD-10-PCS | Mod: 26,,, | Performed by: RADIOLOGY

## 2022-05-17 PROCEDURE — 87340 HEPATITIS B SURFACE AG IA: CPT | Performed by: OBSTETRICS & GYNECOLOGY

## 2022-05-17 PROCEDURE — 86762 RUBELLA ANTIBODY: CPT | Performed by: OBSTETRICS & GYNECOLOGY

## 2022-05-17 PROCEDURE — 76801 OB US < 14 WKS SINGLE FETUS: CPT | Mod: 26,,, | Performed by: RADIOLOGY

## 2022-05-17 PROCEDURE — 76801 OB US < 14 WKS SINGLE FETUS: CPT | Mod: TC

## 2022-05-17 PROCEDURE — 1159F PR MEDICATION LIST DOCUMENTED IN MEDICAL RECORD: ICD-10-PCS | Mod: CPTII,,, | Performed by: OBSTETRICS & GYNECOLOGY

## 2022-05-17 PROCEDURE — 76817 TRANSVAGINAL US OBSTETRIC: CPT | Mod: 26,,, | Performed by: RADIOLOGY

## 2022-05-17 PROCEDURE — 99999 PR PBB SHADOW E&M-EST. PATIENT-LVL III: ICD-10-PCS | Mod: PBBFAC,,, | Performed by: OBSTETRICS & GYNECOLOGY

## 2022-05-17 PROCEDURE — 86850 RBC ANTIBODY SCREEN: CPT | Performed by: OBSTETRICS & GYNECOLOGY

## 2022-05-17 PROCEDURE — 3078F PR MOST RECENT DIASTOLIC BLOOD PRESSURE < 80 MM HG: ICD-10-PCS | Mod: CPTII,,, | Performed by: OBSTETRICS & GYNECOLOGY

## 2022-05-17 PROCEDURE — 3078F DIAST BP <80 MM HG: CPT | Mod: CPTII,,, | Performed by: OBSTETRICS & GYNECOLOGY

## 2022-05-17 PROCEDURE — 85025 COMPLETE CBC W/AUTO DIFF WBC: CPT | Performed by: OBSTETRICS & GYNECOLOGY

## 2022-05-17 NOTE — PROGRESS NOTES
CC:  Chief Complaint   Patient presents with    Initial Prenatal Visit       HPI:    37 y.o.   OB History        2    Para   1    Term           1    AB   1    Living   1       SAB        IAB        Ectopic        Multiple   0    Live Births   1               Complaining of: no complaints,had hepc and was tx'd, u/s done today and has IUP at 7wks  with +FHT's      (Not in a hospital admission)      Review of patient's allergies indicates:   Allergen Reactions    Penicillins Hives        Past Medical History:   Diagnosis Date    Anxiety     Arthritis     Chronic hepatitis C - LOST TO F/U     s/p Rx but lost to f/u. needs HCV RNA to see if cured. J[facility] PA-C [facility]    Depression     Digestive disorder     Herpes genitalia      Past Surgical History:   Procedure Laterality Date     SECTION      INJECTION OF ANESTHETIC AGENT AROUND MEDIAL BRANCH NERVES INNERVATING LUMBAR FACET JOINT Bilateral 2020    Procedure: Block-nerve-medial branch-lumbar--Bilateral L4-5, L5-S1;  Surgeon: Lisbeth Noriega Jr., MD;  Location: [facility];  Service: Pain Management;  Laterality: Bilateral;    INJECTION OF ANESTHETIC AGENT AROUND MEDIAL BRANCH NERVES INNERVATING LUMBAR FACET JOINT Bilateral 9/3/2020    Procedure: Block-nerve-medial branch-lumbar--Bilateral L4-5, L5-S1;  Surgeon: Lisbeth Noriega Jr., MD;  Location: [facility];  Service: Pain Management;  Laterality: Bilateral;    MARSUPIALIZATION OF BARTHOLIN'S GLAND CYST N/A 3/29/2021    Procedure: MARSUPIALIZATION, CYST, BARTHOLIN'S GLAND;  Surgeon: Julie R. Jeansonne, MD;  Location: [facility];  Service: OB/GYN;  Laterality: N/A;    RADIOFREQUENCY THERMOCOAGULATION Right 2020    Procedure: RADIOFREQUENCY THERMAL COAGULATION--Right L4-5, L5-S1;  Surgeon: Lisbeth Noriega Jr., MD;  Location: [facility];  Service: Pain Management;  Laterality: Right;    RADIOFREQUENCY THERMOCOAGULATION Left 2020    Procedure:  RADIOFREQUENCY THERMAL COAGULATION--Left L4-5, L5-S1;  Surgeon: Lisbeth Noriega Jr., MD;  Location: [facility];  Service: Pain Management;  Laterality: Left;    RADIOFREQUENCY THERMOCOAGULATION Right 3/25/2021    Procedure: RADIOFREQUENCY THERMAL COAGULATION--Right L4-5, L5-S1;  Surgeon: Lisbeth Noriega Jr., MD;  Location: [facility];  Service: Pain Management;  Laterality: Right;    RADIOFREQUENCY THERMOCOAGULATION Left 4/1/2021    Procedure: RADIOFREQUENCY THERMAL COAGULATION--Left L4-5, L5-S1;  Surgeon: Lisbeth Noriega Jr., MD;  Location: [facility];  Service: Pain Management;  Laterality: Left;     Family History   Problem Relation Age of Onset    Depression Mother     Hypertension Mother     Depression Sister     Hypertension Paternal Grandmother     Diabetes Maternal Uncle     Breast cancer Neg Hx     Ovarian cancer Neg Hx      Social History     Tobacco Use    Smoking status: Current Every Day Smoker     Packs/day: 1.00    Smokeless tobacco: Never Used   Substance Use Topics    Alcohol use: Not Currently    Drug use: Never     ROS:  GENERAL: Feeling well overall. Denies fever or chills.   SKIN: Denies rash or lesions.   HEAD: Denies head injury or headache.   NODES: Denies enlarged lymph nodes.   CHEST: Denies chest pain or shortness of breath.   CARDIOVASCULAR: Denies palpitations or left sided chest pain.    ABDOMEN: Denies diarrhea, nausea, vomiting or rectal bleeding.   URINARY: No dysuria, hematuria, or burning on urination.  REPRODUCTIVE: See HPI.   BREASTS: Denies pain, lumps, or nipple discharge.   HEMATOLOGIC: No easy bruisability or excessive bleeding.   MUSCULOSKELETAL: Denies joint pain or swelling.   NEUROLOGIC: Denies syncope or weakness.   PSYCHIATRIC: Denies depression, anxiety or mood swings.      PE: /62   Wt 58.1 kg (128 lb)   LMP 03/06/2022 (Exact Date)   BMI 20.05 kg/m²      APPEARANCE: Well nourished, well developed, in no acute distress.  SKIN:  Normal skin turgor, no lesions.  NECK: Neck symmetric without masses or thyromegaly.  NODES: No inguinal, cervical, axillary or femoral lymph node enlargement.  CARDIOVASCULAR: Normal S1, S2. No rubs, murmurs or gallops.  NEUROLOGIC: Normal mood and affect. No depression or anxiety.   ABDOMEN: Soft. No tenderness or masses. No hepatosplenomegaly. No hernias.  RESPIRATORY: Normal respiratory effort with no retractions or use of accessory muscles.    Encounter for supervision of normal pregnancy, unspecified, unspecified trimester     TECHNIQUE:  Transabdominal sonography of the pelvis was performed, followed by transvaginal sonography to better evaluate the uterus and ovaries.     COMPARISON:  None.     FINDINGS:  The uterus measures 9.3 cm in length and 4.9 x 6.1 cm in transverse dimensions.  There is a solitary intrauterine pregnancy.  Yolk sac noted.  The crown-rump length measures 0.96 cm for an estimated gestational age of 7 weeks 0 days.  Average heart rate noted at 138 beats per minute.     Right ovary not visualized.  Left ovary measures 3.8 x 2.1 x 2.7 cm.  Left ovarian cyst measures 1.6 cm, probable corpus luteum..  Trace pelvic free fluid noted.     Impression:     Solitary viable intrauterine pregnancy with estimated gestational age 7 weeks 0 days, for an MARCUS of 01/03/2023.        Electronically signed by: Eric Faye MD  Date:                                            05/17/2022  Time:                                           14:15  ASSESSMENT/ PLAN    Agnes was seen today for initial prenatal visit.    Diagnoses and all orders for this visit:    7 weeks gestation of pregnancy  -     HIV 1/2 Ag/Ab (4th Gen); Future  -     RPR; Future  -     Hepatitis B Surface Antigen; Future  -     Type & Screen - Ob Profile; Future  -     Rubella Antibody, IgG; Future  -     CBC Auto Differential; Future  -     Cancel: HEPATITIS C ANTIBODY; Future  -     Cancel: Hepatitis C Genotype; Future  -     HCV Monitor,  Marcelle Amplicor; Future    History of positive hepatitis C  -     HCV Monitor, Marcelle Amplicor; Future            Salvatore Downs MD

## 2022-05-18 LAB
RPR SER QL: NORMAL
RUBV IGG SER-ACNC: 13.1 IU/ML
RUBV IGG SER-IMP: REACTIVE

## 2022-05-19 ENCOUNTER — PATIENT MESSAGE (OUTPATIENT)
Dept: OBSTETRICS AND GYNECOLOGY | Facility: CLINIC | Age: 37
End: 2022-05-19
Payer: MEDICAID

## 2022-05-20 LAB
HBV SURFACE AG SERPL QL IA: NEGATIVE
HIV 1+2 AB+HIV1 P24 AG SERPL QL IA: NEGATIVE

## 2022-06-11 ENCOUNTER — PATIENT MESSAGE (OUTPATIENT)
Dept: OBSTETRICS AND GYNECOLOGY | Facility: CLINIC | Age: 37
End: 2022-06-11
Payer: MEDICAID

## 2022-06-14 ENCOUNTER — LAB VISIT (OUTPATIENT)
Dept: LAB | Facility: HOSPITAL | Age: 37
End: 2022-06-14
Attending: STUDENT IN AN ORGANIZED HEALTH CARE EDUCATION/TRAINING PROGRAM
Payer: MEDICAID

## 2022-06-14 ENCOUNTER — PATIENT MESSAGE (OUTPATIENT)
Dept: ADMINISTRATIVE | Facility: OTHER | Age: 37
End: 2022-06-14
Payer: MEDICAID

## 2022-06-14 ENCOUNTER — ROUTINE PRENATAL (OUTPATIENT)
Dept: OBSTETRICS AND GYNECOLOGY | Facility: CLINIC | Age: 37
End: 2022-06-14
Payer: MEDICAID

## 2022-06-14 VITALS
SYSTOLIC BLOOD PRESSURE: 100 MMHG | BODY MASS INDEX: 20.18 KG/M2 | DIASTOLIC BLOOD PRESSURE: 66 MMHG | WEIGHT: 128.88 LBS

## 2022-06-14 DIAGNOSIS — Z32.01 POSITIVE URINE PREGNANCY TEST: ICD-10-CM

## 2022-06-14 DIAGNOSIS — Z3A.12 12 WEEKS GESTATION OF PREGNANCY: Primary | ICD-10-CM

## 2022-06-14 PROCEDURE — 36415 COLL VENOUS BLD VENIPUNCTURE: CPT | Performed by: STUDENT IN AN ORGANIZED HEALTH CARE EDUCATION/TRAINING PROGRAM

## 2022-06-14 PROCEDURE — 99213 OFFICE O/P EST LOW 20 MIN: CPT | Mod: TH,S$PBB,, | Performed by: OBSTETRICS & GYNECOLOGY

## 2022-06-14 PROCEDURE — 99999 PR PBB SHADOW E&M-EST. PATIENT-LVL II: ICD-10-PCS | Mod: PBBFAC,,, | Performed by: OBSTETRICS & GYNECOLOGY

## 2022-06-14 PROCEDURE — 99999 PR PBB SHADOW E&M-EST. PATIENT-LVL II: CPT | Mod: PBBFAC,,, | Performed by: OBSTETRICS & GYNECOLOGY

## 2022-06-14 PROCEDURE — 99212 OFFICE O/P EST SF 10 MIN: CPT | Mod: PBBFAC,PO | Performed by: OBSTETRICS & GYNECOLOGY

## 2022-06-14 PROCEDURE — 99213 PR OFFICE/OUTPT VISIT, EST, LEVL III, 20-29 MIN: ICD-10-PCS | Mod: TH,S$PBB,, | Performed by: OBSTETRICS & GYNECOLOGY

## 2022-06-14 PROCEDURE — 87522 HEPATITIS C REVRS TRNSCRPJ: CPT | Performed by: STUDENT IN AN ORGANIZED HEALTH CARE EDUCATION/TRAINING PROGRAM

## 2022-06-14 NOTE — PROGRESS NOTES
Doing well, FHT 150s   No bleeding, discharge, rash, itch, contractions    Patient doing well. No vaginal bleeding or cramping noted. Discussed the need for an anatomy scan between 18-20 weeks. Discussed AFP screen. Maternity 21 screen ordered today. RTC in 4 weeks.    Vitals signs, FHTs, urine dip, and PE findings documented, reviewed and available in OB flow chart.       I spent a total of 20 minutes on the day of the visit.This includes face to face time and non-face to face time preparing to see the patient (eg, review of tests), Obtaining and/or reviewing separately obtained history, Documenting clinical information in the electronic or other health record, Independently interpreting resultsand communicating results to the patient/family/caregiver, or Care coordination.

## 2022-06-15 ENCOUNTER — PATIENT MESSAGE (OUTPATIENT)
Dept: OBSTETRICS AND GYNECOLOGY | Facility: CLINIC | Age: 37
End: 2022-06-15
Payer: MEDICAID

## 2022-06-16 LAB — HCV RNA SERPL NAA+PROBE-ACNC: NORMAL IU/ML

## 2022-06-21 ENCOUNTER — TELEPHONE (OUTPATIENT)
Dept: OBSTETRICS AND GYNECOLOGY | Facility: CLINIC | Age: 37
End: 2022-06-21
Payer: MEDICAID

## 2022-07-12 ENCOUNTER — ROUTINE PRENATAL (OUTPATIENT)
Dept: OBSTETRICS AND GYNECOLOGY | Facility: CLINIC | Age: 37
End: 2022-07-12
Payer: MEDICAID

## 2022-07-12 VITALS
WEIGHT: 127.44 LBS | SYSTOLIC BLOOD PRESSURE: 102 MMHG | DIASTOLIC BLOOD PRESSURE: 64 MMHG | BODY MASS INDEX: 19.96 KG/M2

## 2022-07-12 DIAGNOSIS — O09.522 MULTIGRAVIDA OF ADVANCED MATERNAL AGE IN SECOND TRIMESTER: Primary | ICD-10-CM

## 2022-07-12 DIAGNOSIS — Z3A.15 15 WEEKS GESTATION OF PREGNANCY: ICD-10-CM

## 2022-07-12 PROCEDURE — 99213 OFFICE O/P EST LOW 20 MIN: CPT | Mod: TH,S$PBB,, | Performed by: OBSTETRICS & GYNECOLOGY

## 2022-07-12 PROCEDURE — 99999 PR PBB SHADOW E&M-EST. PATIENT-LVL III: CPT | Mod: PBBFAC,,, | Performed by: OBSTETRICS & GYNECOLOGY

## 2022-07-12 PROCEDURE — 99213 PR OFFICE/OUTPT VISIT, EST, LEVL III, 20-29 MIN: ICD-10-PCS | Mod: TH,S$PBB,, | Performed by: OBSTETRICS & GYNECOLOGY

## 2022-07-12 PROCEDURE — 99999 PR PBB SHADOW E&M-EST. PATIENT-LVL III: ICD-10-PCS | Mod: PBBFAC,,, | Performed by: OBSTETRICS & GYNECOLOGY

## 2022-07-12 PROCEDURE — 99213 OFFICE O/P EST LOW 20 MIN: CPT | Mod: PBBFAC,TH,PO | Performed by: OBSTETRICS & GYNECOLOGY

## 2022-07-12 NOTE — PROGRESS NOTES
Doing well, no problems  fht's 130-40's  mfm in 5 wks    Patient with no complaints. Denies vaginal bleeding or cramping.  . Anatomy scan 5wks. RTC in 5weeks    Vitals signs, FHTs, urine dip, and PE findings documented, reviewed and available in OB flow chart.       I spent a total of 20 minutes on the day of the visit.This includes face to face time and non-face to face time preparing to see the patient (eg, review of tests), Obtaining and/or reviewing separately obtained history, Documenting clinical information in the electronic or other health record, Independently interpreting resultsand communicating results to the patient/family/caregiver, or Care coordination.

## 2022-07-13 ENCOUNTER — TELEPHONE (OUTPATIENT)
Dept: OBSTETRICS AND GYNECOLOGY | Facility: CLINIC | Age: 37
End: 2022-07-13

## 2022-07-13 NOTE — TELEPHONE ENCOUNTER
----- Message from Sosa Smith sent at 7/13/2022 11:15 AM CDT -----  .Type:  Needs Medical Advice    Who Called: PT  Symptoms (please be specific):    How long has patient had these symptoms:    Pharmacy name and phone #:    Would the patient rather a call back or a response via [facility]? Best Call Back Number: [phone]  Additional Information: WANTS OB APPT FOR 8/18

## 2022-07-13 NOTE — TELEPHONE ENCOUNTER
Patient scheduled for routine ob visit on 08/23. All questions answered and patient verbalized understanding.

## 2022-07-29 ENCOUNTER — PATIENT MESSAGE (OUTPATIENT)
Dept: OBSTETRICS AND GYNECOLOGY | Facility: CLINIC | Age: 37
End: 2022-07-29

## 2022-08-18 ENCOUNTER — PROCEDURE VISIT (OUTPATIENT)
Dept: MATERNAL FETAL MEDICINE | Facility: HOSPITAL | Age: 37
End: 2022-08-18
Payer: MEDICAID

## 2022-08-18 ENCOUNTER — PATIENT MESSAGE (OUTPATIENT)
Dept: OBSTETRICS AND GYNECOLOGY | Facility: CLINIC | Age: 37
End: 2022-08-18

## 2022-08-18 VITALS
HEIGHT: 67 IN | DIASTOLIC BLOOD PRESSURE: 60 MMHG | WEIGHT: 133.63 LBS | SYSTOLIC BLOOD PRESSURE: 100 MMHG | BODY MASS INDEX: 20.97 KG/M2

## 2022-08-18 DIAGNOSIS — O09.522 MULTIGRAVIDA OF ADVANCED MATERNAL AGE IN SECOND TRIMESTER: ICD-10-CM

## 2022-08-18 DIAGNOSIS — Z36.3 ANTENATAL SCREENING FOR MALFORMATION USING ULTRASONICS: ICD-10-CM

## 2022-08-18 DIAGNOSIS — Z87.51 HISTORY OF PRETERM DELIVERY: Primary | ICD-10-CM

## 2022-08-18 DIAGNOSIS — O09.899 HISTORY OF PRETERM DELIVERY, CURRENTLY PREGNANT: ICD-10-CM

## 2022-08-18 DIAGNOSIS — Z36.4 ANTENATAL SCREENING FOR FETAL GROWTH RETARDATION USING ULTRASONICS: ICD-10-CM

## 2022-08-18 DIAGNOSIS — Z3A.20 20 WEEKS GESTATION OF PREGNANCY: ICD-10-CM

## 2022-08-18 PROCEDURE — 76817 PR US, OB, TRANSVAG APPROACH: ICD-10-PCS | Mod: 26,S$PBB,, | Performed by: OBSTETRICS & GYNECOLOGY

## 2022-08-18 PROCEDURE — 99202 PR OFFICE/OUTPT VISIT, NEW, LEVL II, 15-29 MIN: ICD-10-PCS | Mod: 25,TH,S$PBB, | Performed by: OBSTETRICS & GYNECOLOGY

## 2022-08-18 PROCEDURE — 76811 PR US, OB FETAL EVAL & EXAM, TRANSABDOM,FIRST GESTATION: ICD-10-PCS | Mod: 26,S$PBB,, | Performed by: OBSTETRICS & GYNECOLOGY

## 2022-08-18 PROCEDURE — 76811 OB US DETAILED SNGL FETUS: CPT | Performed by: OBSTETRICS & GYNECOLOGY

## 2022-08-18 PROCEDURE — 76817 TRANSVAGINAL US OBSTETRIC: CPT | Mod: 26,S$PBB,, | Performed by: OBSTETRICS & GYNECOLOGY

## 2022-08-18 PROCEDURE — 99202 OFFICE O/P NEW SF 15 MIN: CPT | Mod: 25,TH,S$PBB, | Performed by: OBSTETRICS & GYNECOLOGY

## 2022-08-18 PROCEDURE — 76817 TRANSVAGINAL US OBSTETRIC: CPT | Performed by: OBSTETRICS & GYNECOLOGY

## 2022-08-18 PROCEDURE — 76811 OB US DETAILED SNGL FETUS: CPT | Mod: 26,S$PBB,, | Performed by: OBSTETRICS & GYNECOLOGY

## 2022-08-18 NOTE — PROGRESS NOTES
See ultrasound report for details of ultrasound evaluation, consultation,  and recommendations.

## 2022-08-19 ENCOUNTER — TELEPHONE (OUTPATIENT)
Dept: OBSTETRICS AND GYNECOLOGY | Facility: CLINIC | Age: 37
End: 2022-08-19

## 2022-08-19 RX ORDER — HYDROXYPROGESTERONE CAPROATE 1250 MG/5ML
250 INJECTION INTRAMUSCULAR
Status: DISCONTINUED | OUTPATIENT
Start: 2022-08-19 | End: 2022-12-21 | Stop reason: HOSPADM

## 2022-08-19 NOTE — TELEPHONE ENCOUNTER
----- Message from Farideh Lal RN sent at 8/18/2022  3:52 PM CDT -----   also offered her 17 OHP and the patient would like to start it.  Please set her up for injections.    Thanks

## 2022-08-19 NOTE — TELEPHONE ENCOUNTER
Order placed for dayday  Start weekly inj at [place] next week  Cervical length in [place] in 2wk per mfm recs  Order placed schedule [t

## 2022-08-23 ENCOUNTER — CLINICAL SUPPORT (OUTPATIENT)
Dept: OBSTETRICS AND GYNECOLOGY | Facility: CLINIC | Age: 37
End: 2022-08-23
Payer: MEDICAID

## 2022-08-23 ENCOUNTER — ROUTINE PRENATAL (OUTPATIENT)
Dept: OBSTETRICS AND GYNECOLOGY | Facility: CLINIC | Age: 37
End: 2022-08-23
Payer: MEDICAID

## 2022-08-23 VITALS
BODY MASS INDEX: 20.79 KG/M2 | SYSTOLIC BLOOD PRESSURE: 100 MMHG | WEIGHT: 132.69 LBS | DIASTOLIC BLOOD PRESSURE: 60 MMHG

## 2022-08-23 DIAGNOSIS — O09.899 HX OF PRETERM DELIVERY, CURRENTLY PREGNANT: ICD-10-CM

## 2022-08-23 DIAGNOSIS — Z3A.21 21 WEEKS GESTATION OF PREGNANCY: Primary | ICD-10-CM

## 2022-08-23 DIAGNOSIS — O09.899 HX OF PRETERM DELIVERY, CURRENTLY PREGNANT: Primary | ICD-10-CM

## 2022-08-23 DIAGNOSIS — O09.522 MULTIGRAVIDA OF ADVANCED MATERNAL AGE IN SECOND TRIMESTER: ICD-10-CM

## 2022-08-23 PROCEDURE — 96372 THER/PROPH/DIAG INJ SC/IM: CPT | Mod: PBBFAC,PO

## 2022-08-23 PROCEDURE — 99213 PR OFFICE/OUTPT VISIT, EST, LEVL III, 20-29 MIN: ICD-10-PCS | Mod: TH,S$PBB,, | Performed by: OBSTETRICS & GYNECOLOGY

## 2022-08-23 PROCEDURE — 99213 OFFICE O/P EST LOW 20 MIN: CPT | Mod: TH,S$PBB,, | Performed by: OBSTETRICS & GYNECOLOGY

## 2022-08-23 PROCEDURE — 99999 PR PBB SHADOW E&M-EST. PATIENT-LVL II: CPT | Mod: PBBFAC,,, | Performed by: OBSTETRICS & GYNECOLOGY

## 2022-08-23 PROCEDURE — 99999 PR PBB SHADOW E&M-EST. PATIENT-LVL II: ICD-10-PCS | Mod: PBBFAC,,, | Performed by: OBSTETRICS & GYNECOLOGY

## 2022-08-23 PROCEDURE — 99212 OFFICE O/P EST SF 10 MIN: CPT | Mod: PBBFAC,TH,PO | Performed by: OBSTETRICS & GYNECOLOGY

## 2022-08-23 RX ADMIN — HYDROXYPROGESTERONE CAPROATE 250 MG: 250 INJECTION INTRAMUSCULAR at 10:08

## 2022-08-23 NOTE — PROGRESS NOTES
Patient with no complaints. Denies vaginal bleeding or cramping.  Good FM.   FHTs 130s  Weekly 17-OHP injections, obtained 1st today  US scheduled for cervical length  Consents obtained today for c/s and BTL  Discussed with patient having glucose testing for gestational diabetes preformed between 24-28 weeks. RTC in 4 weeks.     Vitals signs, FHTs, urine dip, and PE findings documented, reviewed and available in OB flow chart.       I spent a total of 20 minutes on the day of the visit.This includes face to face time and non-face to face time preparing to see the patient (eg, review of tests), Obtaining and/or reviewing separately obtained history, Documenting clinical information in the electronic or other health record, Independently interpreting resultsand communicating results to the patient/family/caregiver, or Care coordination.     Coffective counseling sheet Learn Your Baby and Protect Breastfeeding discussed with mother. Instructed regarding feeding cues and methods to calm baby. Encouraged mother to download Coffective mobile zuleima if she has not already done so.  Mother verbalized understanding.

## 2022-08-23 NOTE — PROGRESS NOTES
Patient arrived to clinic for scheduled [place] injection. Injected 250 mg into RUOQG as per MD order. Patient tolerated well. No complaints before or after injection. Advised to wait in waiting room for 15 min to monitor for adverse reactions. Advised to return to clinic in one week for next [place] injection. Patient verbalized understanding.

## 2022-08-29 ENCOUNTER — TELEPHONE (OUTPATIENT)
Dept: OBSTETRICS AND GYNECOLOGY | Facility: CLINIC | Age: 37
End: 2022-08-29
Payer: MEDICAID

## 2022-08-29 ENCOUNTER — PATIENT MESSAGE (OUTPATIENT)
Dept: OBSTETRICS AND GYNECOLOGY | Facility: CLINIC | Age: 37
End: 2022-08-29
Payer: MEDICAID

## 2022-08-29 NOTE — TELEPHONE ENCOUNTER
Spoke to pt to scheduled follow up on ob visit next month-and also asked her if she has signed the medicaid sterilization form  for her tubal because I came across her delivery consent that were left unattended. Pt will be in clinic tomorrow for an ultrasound and dayday injection if you would like her to sign this form. Please advise

## 2022-08-29 NOTE — TELEPHONE ENCOUNTER
What date should this sterilization form state since he forgot to get the patient to sign this on 8/23/2022. I would think 8/30/22 -so should I make a appointment on his schedule for this so that he can write an encounter note. Please advise

## 2022-08-30 ENCOUNTER — CLINICAL SUPPORT (OUTPATIENT)
Dept: OBSTETRICS AND GYNECOLOGY | Facility: CLINIC | Age: 37
End: 2022-08-30
Payer: MEDICAID

## 2022-08-30 ENCOUNTER — PROCEDURE VISIT (OUTPATIENT)
Dept: OBSTETRICS AND GYNECOLOGY | Facility: CLINIC | Age: 37
End: 2022-08-30
Payer: MEDICAID

## 2022-08-30 DIAGNOSIS — O09.899 HX OF PRETERM DELIVERY, CURRENTLY PREGNANT: ICD-10-CM

## 2022-08-30 DIAGNOSIS — Z3A.21 21 WEEKS GESTATION OF PREGNANCY: ICD-10-CM

## 2022-08-30 DIAGNOSIS — O09.899 HX OF PRETERM DELIVERY, CURRENTLY PREGNANT: Primary | ICD-10-CM

## 2022-08-30 PROCEDURE — 76805 OB US >/= 14 WKS SNGL FETUS: CPT | Mod: PBBFAC,PO

## 2022-08-30 PROCEDURE — 76805 US OB/GYN PROCEDURE (VIEWPOINT): ICD-10-PCS | Mod: 26,S$PBB,, | Performed by: OBSTETRICS & GYNECOLOGY

## 2022-08-30 PROCEDURE — 96372 THER/PROPH/DIAG INJ SC/IM: CPT | Mod: PBBFAC,PO

## 2022-08-30 PROCEDURE — 76805 OB US >/= 14 WKS SNGL FETUS: CPT | Mod: 26,S$PBB,, | Performed by: OBSTETRICS & GYNECOLOGY

## 2022-08-30 PROCEDURE — 76817 US OB/GYN PROCEDURE (VIEWPOINT): ICD-10-PCS | Mod: 26,S$PBB,, | Performed by: OBSTETRICS & GYNECOLOGY

## 2022-08-30 PROCEDURE — 76817 TRANSVAGINAL US OBSTETRIC: CPT | Mod: 26,S$PBB,, | Performed by: OBSTETRICS & GYNECOLOGY

## 2022-08-30 RX ADMIN — HYDROXYPROGESTERONE CAPROATE 250 MG: 250 INJECTION INTRAMUSCULAR at 08:08

## 2022-08-30 NOTE — PROGRESS NOTES
Patient arrived to clinic for scheduled [place] injection. Injected 250 mg into LUOQG as per MD order. Patient tolerated well. No complaints before or after injection. Advised to wait in waiting room for 15 min to monitor for adverse reactions. Advised to return to clinic in one week for next [place] injection. Patient verbalized understanding.

## 2022-09-05 ENCOUNTER — PATIENT MESSAGE (OUTPATIENT)
Dept: OBSTETRICS AND GYNECOLOGY | Facility: CLINIC | Age: 37
End: 2022-09-05
Payer: MEDICAID

## 2022-09-13 ENCOUNTER — TELEPHONE (OUTPATIENT)
Dept: OBSTETRICS AND GYNECOLOGY | Facility: CLINIC | Age: 37
End: 2022-09-13
Payer: MEDICAID

## 2022-09-13 NOTE — TELEPHONE ENCOUNTER
Called patient to reschedule her missed [place] injection appointment from this morning. When offering patient a time to come this afternoon, patient stated that she could not come this afternoon. Patient rescheduled for injection this Thursday, 9/15/22, at 08:30 AM. Patient verbalized understanding. No other questions or concerns.

## 2022-09-15 ENCOUNTER — CLINICAL SUPPORT (OUTPATIENT)
Dept: OBSTETRICS AND GYNECOLOGY | Facility: CLINIC | Age: 37
End: 2022-09-15
Payer: MEDICAID

## 2022-09-15 DIAGNOSIS — O09.899 HX OF PRETERM DELIVERY, CURRENTLY PREGNANT: Primary | ICD-10-CM

## 2022-09-15 PROCEDURE — 96372 THER/PROPH/DIAG INJ SC/IM: CPT | Mod: PBBFAC,PO

## 2022-09-15 RX ADMIN — HYDROXYPROGESTERONE CAPROATE 250 MG: 250 INJECTION INTRAMUSCULAR at 08:09

## 2022-09-15 NOTE — PROGRESS NOTES
Patient arrived to clinic for scheduled [place] injection. Injected 250 mg into RUOQG (injection site per patient request) as per MD order. Patient tolerated well. No complaints before or after injection. Advised to wait in waiting room for 15 min to monitor for adverse reactions. Advised to return to clinic in one week for next [place] injection. Patient verbalized understanding.

## 2022-09-20 ENCOUNTER — ROUTINE PRENATAL (OUTPATIENT)
Dept: OBSTETRICS AND GYNECOLOGY | Facility: CLINIC | Age: 37
End: 2022-09-20
Payer: MEDICAID

## 2022-09-20 ENCOUNTER — PATIENT MESSAGE (OUTPATIENT)
Dept: ADMINISTRATIVE | Facility: OTHER | Age: 37
End: 2022-09-20
Payer: MEDICAID

## 2022-09-20 VITALS
WEIGHT: 136.88 LBS | SYSTOLIC BLOOD PRESSURE: 108 MMHG | BODY MASS INDEX: 21.44 KG/M2 | DIASTOLIC BLOOD PRESSURE: 66 MMHG

## 2022-09-20 DIAGNOSIS — Z3A.25 25 WEEKS GESTATION OF PREGNANCY: Primary | ICD-10-CM

## 2022-09-20 PROCEDURE — 99999 PR PBB SHADOW E&M-EST. PATIENT-LVL II: ICD-10-PCS | Mod: PBBFAC,,, | Performed by: OBSTETRICS & GYNECOLOGY

## 2022-09-20 PROCEDURE — 99212 OFFICE O/P EST SF 10 MIN: CPT | Mod: PBBFAC,PO | Performed by: OBSTETRICS & GYNECOLOGY

## 2022-09-20 PROCEDURE — 99213 PR OFFICE/OUTPT VISIT, EST, LEVL III, 20-29 MIN: ICD-10-PCS | Mod: TH,S$PBB,, | Performed by: OBSTETRICS & GYNECOLOGY

## 2022-09-20 PROCEDURE — 99213 OFFICE O/P EST LOW 20 MIN: CPT | Mod: TH,S$PBB,, | Performed by: OBSTETRICS & GYNECOLOGY

## 2022-09-20 PROCEDURE — 99999 PR PBB SHADOW E&M-EST. PATIENT-LVL II: CPT | Mod: PBBFAC,,, | Performed by: OBSTETRICS & GYNECOLOGY

## 2022-09-20 NOTE — PROGRESS NOTES
Today, endorses rash present on right side of lower back/flank, right buttocks, and left knee. Rash is itchy and present since before pregnancy. Today, denies vaginal bleeding or contractions. Good FM.  On PE, rash scaly erythematous well circumscribed.  FHTs 130s  1 hr glucose test ordered today to be done at 28 weeks  Consent obtained for tubal ligation.   RTC in 3-4 weeks.     Vitals signs, FHTs, urine dip, and PE findings documented, reviewed and available in OB flow chart.       I spent a total of 20 minutes on the day of the visit.This includes face to face time and non-face to face time preparing to see the patient (eg, review of tests), Obtaining and/or reviewing separately obtained history, Documenting clinical information in the electronic or other health record, Independently interpreting resultsand communicating results to the patient/family/caregiver, or Care coordination.     Quintin Chaidez DO  [facility], PGY-2  09/20/2022

## 2022-09-20 NOTE — PROGRESS NOTES
Doing ok fht's 130's  Agree with residents note  rtc 3 wks for glu screen  [facility]ca[facility] BTL consents done will do at time of next c/s, needs BTL surgical consents at next OV

## 2022-09-22 ENCOUNTER — CLINICAL SUPPORT (OUTPATIENT)
Dept: OBSTETRICS AND GYNECOLOGY | Facility: CLINIC | Age: 37
End: 2022-09-22
Payer: MEDICAID

## 2022-09-22 DIAGNOSIS — O09.899 HX OF PRETERM DELIVERY, CURRENTLY PREGNANT: Primary | ICD-10-CM

## 2022-09-22 PROCEDURE — 96372 THER/PROPH/DIAG INJ SC/IM: CPT | Mod: PBBFAC,PO

## 2022-09-22 RX ADMIN — HYDROXYPROGESTERONE CAPROATE 250 MG: 250 INJECTION INTRAMUSCULAR at 08:09

## 2022-09-22 NOTE — PROGRESS NOTES
Patient arrived to clinic for scheduled [place] injection. Injected 250 mg into LUOQG as per MD order. Patient tolerated well. No complaints before or after injection. Advised to wait in waiting room for 15 min to monitor for adverse reactions. Advised to return to clinic in one week for next [place] injection. Patient verbalized understanding.

## 2022-09-29 ENCOUNTER — CLINICAL SUPPORT (OUTPATIENT)
Dept: OBSTETRICS AND GYNECOLOGY | Facility: CLINIC | Age: 37
End: 2022-09-29
Payer: MEDICAID

## 2022-09-29 DIAGNOSIS — O09.899 HX OF PRETERM DELIVERY, CURRENTLY PREGNANT: Primary | ICD-10-CM

## 2022-09-29 PROCEDURE — 96372 THER/PROPH/DIAG INJ SC/IM: CPT | Mod: PBBFAC,PO

## 2022-09-29 RX ADMIN — HYDROXYPROGESTERONE CAPROATE 250 MG: 250 INJECTION INTRAMUSCULAR at 08:09

## 2022-09-29 NOTE — PROGRESS NOTES
Patient arrived to clinic for scheduled [place] injection. Injected 250 mg into RUOQG as per MD order. Patient tolerated well. No complaints before or after injection. Advised to wait in waiting room for 15 min to monitor for adverse reactions. Advised to return to clinic in one week for next [place] injection. Patient verbalized understanding.

## 2022-10-06 ENCOUNTER — PATIENT MESSAGE (OUTPATIENT)
Dept: OBSTETRICS AND GYNECOLOGY | Facility: CLINIC | Age: 37
End: 2022-10-06
Payer: MEDICAID

## 2022-10-06 ENCOUNTER — TELEPHONE (OUTPATIENT)
Dept: OBSTETRICS AND GYNECOLOGY | Facility: CLINIC | Age: 37
End: 2022-10-06
Payer: MEDICAID

## 2022-10-06 ENCOUNTER — CLINICAL SUPPORT (OUTPATIENT)
Dept: OBSTETRICS AND GYNECOLOGY | Facility: CLINIC | Age: 37
End: 2022-10-06
Payer: MEDICAID

## 2022-10-06 DIAGNOSIS — O09.899 HX OF PRETERM DELIVERY, CURRENTLY PREGNANT: Primary | ICD-10-CM

## 2022-10-06 PROCEDURE — 96372 THER/PROPH/DIAG INJ SC/IM: CPT | Mod: PBBFAC,PO

## 2022-10-06 RX ADMIN — HYDROXYPROGESTERONE CAPROATE 250 MG: 250 INJECTION INTRAMUSCULAR at 03:10

## 2022-10-06 NOTE — PROGRESS NOTES
Patient arrived to clinic for scheduled [place] injection. Injected 250 mg into LUOQG as per MD order. Patient tolerated well. No complaints before or after injection. Advised to wait in waiting room for 15 min to monitor for adverse reactions. Advised to return to clinic in one week for next [place] injection. Patient verbalized understanding.    TDAP VIS sheet given to patient for reference. Patient would like to receive her TDAP vaccine at her injection appointment next week on Thursday, 10/13/22, at 10:40 AM.     Patient stated that she has a planned trip to [place] for Thanksgiving with family and friends.

## 2022-10-06 NOTE — TELEPHONE ENCOUNTER
ARNOLDM notifying patient the reason for my phone call is to reschedule patient's missed injection appointment from this morning. Wanted to know if patient would still like to come this morning for her injection or if she would rather come this afternoon. Asked for a call back. Will follow up with MyChart msg as well.

## 2022-10-12 ENCOUNTER — TELEPHONE (OUTPATIENT)
Dept: OBSTETRICS AND GYNECOLOGY | Facility: CLINIC | Age: 37
End: 2022-10-12
Payer: MEDICAID

## 2022-10-12 NOTE — TELEPHONE ENCOUNTER
Good Morning Agnes Lee is coming tomorrow to get her Beth injection. Tomorrow she will be 28 weeks 2 days. Do you mind signing the pended TDAP order so that she is able to get her TDAP injection tomorrow as well?    Thanks in advance,    Nunu

## 2022-10-13 ENCOUNTER — CLINICAL SUPPORT (OUTPATIENT)
Dept: OBSTETRICS AND GYNECOLOGY | Facility: CLINIC | Age: 37
End: 2022-10-13
Payer: MEDICAID

## 2022-10-13 DIAGNOSIS — O09.899 HX OF PRETERM DELIVERY, CURRENTLY PREGNANT: ICD-10-CM

## 2022-10-13 DIAGNOSIS — Z23 NEED FOR TETANUS, DIPHTHERIA, AND ACELLULAR PERTUSSIS (TDAP) VACCINE: Primary | ICD-10-CM

## 2022-10-13 PROCEDURE — 96372 THER/PROPH/DIAG INJ SC/IM: CPT | Mod: PBBFAC,PO

## 2022-10-13 PROCEDURE — 90715 TDAP VACCINE 7 YRS/> IM: CPT | Mod: PBBFAC,PO

## 2022-10-13 RX ADMIN — HYDROXYPROGESTERONE CAPROATE 250 MG: 250 INJECTION INTRAMUSCULAR at 11:10

## 2022-10-13 NOTE — PROGRESS NOTES
Patient arrived to clinic for scheduled [place] injection. Injected 250 mg into RUOQG as per MD order. Patient tolerated well. No complaints before or after injection. Advised to wait in waiting room for 15 min to monitor for adverse reactions. Advised to return to clinic in one week for next [place] injection. Patient verbalized understanding.    Patient given TDAP in LEFT deltoid. VIS given. Patient asked to wait 15 min in the lobby to monitor for adverse reaction. Patient verbalized understanding. Patient has received immunizations in the past without any complications.     Patient's  with her for this injection appointment.    Reminded patient of Routine OB appointment with Dr. Donws next week on Tuesday, 10/18/22, @ 3:15 PM. Patient verbalized understanding.

## 2022-10-18 ENCOUNTER — LAB VISIT (OUTPATIENT)
Dept: LAB | Facility: HOSPITAL | Age: 37
End: 2022-10-18
Attending: STUDENT IN AN ORGANIZED HEALTH CARE EDUCATION/TRAINING PROGRAM
Payer: MEDICAID

## 2022-10-18 ENCOUNTER — ROUTINE PRENATAL (OUTPATIENT)
Dept: OBSTETRICS AND GYNECOLOGY | Facility: CLINIC | Age: 37
End: 2022-10-18
Payer: MEDICAID

## 2022-10-18 VITALS
SYSTOLIC BLOOD PRESSURE: 110 MMHG | BODY MASS INDEX: 22.43 KG/M2 | DIASTOLIC BLOOD PRESSURE: 60 MMHG | WEIGHT: 143.19 LBS

## 2022-10-18 DIAGNOSIS — Z3A.40 40 WEEKS GESTATION OF PREGNANCY: ICD-10-CM

## 2022-10-18 DIAGNOSIS — Z3A.25 25 WEEKS GESTATION OF PREGNANCY: ICD-10-CM

## 2022-10-18 DIAGNOSIS — Z3A.29 29 WEEKS GESTATION OF PREGNANCY: Primary | ICD-10-CM

## 2022-10-18 LAB — GLUCOSE SERPL-MCNC: 138 MG/DL (ref 70–140)

## 2022-10-18 PROCEDURE — 99213 PR OFFICE/OUTPT VISIT, EST, LEVL III, 20-29 MIN: ICD-10-PCS | Mod: TH,S$PBB,, | Performed by: OBSTETRICS & GYNECOLOGY

## 2022-10-18 PROCEDURE — 99213 OFFICE O/P EST LOW 20 MIN: CPT | Mod: TH,S$PBB,, | Performed by: OBSTETRICS & GYNECOLOGY

## 2022-10-18 PROCEDURE — 99213 OFFICE O/P EST LOW 20 MIN: CPT | Mod: PBBFAC,PO | Performed by: OBSTETRICS & GYNECOLOGY

## 2022-10-18 PROCEDURE — 82950 GLUCOSE TEST: CPT | Performed by: STUDENT IN AN ORGANIZED HEALTH CARE EDUCATION/TRAINING PROGRAM

## 2022-10-18 PROCEDURE — 36415 COLL VENOUS BLD VENIPUNCTURE: CPT | Performed by: STUDENT IN AN ORGANIZED HEALTH CARE EDUCATION/TRAINING PROGRAM

## 2022-10-18 PROCEDURE — 99999 PR PBB SHADOW E&M-EST. PATIENT-LVL III: ICD-10-PCS | Mod: PBBFAC,,, | Performed by: OBSTETRICS & GYNECOLOGY

## 2022-10-18 PROCEDURE — 99999 PR PBB SHADOW E&M-EST. PATIENT-LVL III: CPT | Mod: PBBFAC,,, | Performed by: OBSTETRICS & GYNECOLOGY

## 2022-10-18 NOTE — PROGRESS NOTES
Doing dm screen today  FHT's 140's  Cont Beth  rtc 4 wks  Ok with 1/2/23 for c/s and BTL, scheduled today  Patient with no complaints. Denies vaginal bleeding or contractions. Good FM.Vitals signs, FHTs, urine dip, and PE findings documented, reviewed and available in OB flow chart.       I spent a total of 20 minutes on the day of the visit.This includes face to face time and non-face to face time preparing to see the patient (eg, review of tests), Obtaining and/or reviewing separately obtained history, Documenting clinical information in the electronic or other health record, Independently interpreting resultsand communicating results to the patient/family/caregiver, or Care coordination.

## 2022-10-20 ENCOUNTER — CLINICAL SUPPORT (OUTPATIENT)
Dept: OBSTETRICS AND GYNECOLOGY | Facility: CLINIC | Age: 37
End: 2022-10-20
Payer: MEDICAID

## 2022-10-20 DIAGNOSIS — O09.899 HX OF PRETERM DELIVERY, CURRENTLY PREGNANT: Primary | ICD-10-CM

## 2022-10-20 PROCEDURE — 96372 THER/PROPH/DIAG INJ SC/IM: CPT | Mod: PBBFAC,PO

## 2022-10-20 RX ADMIN — HYDROXYPROGESTERONE CAPROATE 250 MG: 250 INJECTION INTRAMUSCULAR at 10:10

## 2022-10-20 NOTE — PROGRESS NOTES
Patient arrived to clinic for scheduled [place] injection. Injected 250 mg into LUOQG as per MD order. Patient tolerated well. No complaints before or after injection. Advised to wait in waiting room for 15 min to monitor for adverse reactions. Advised to return to clinic in one week for next [place] injection. Patient verbalized understanding.   Statement Selected

## 2022-10-27 ENCOUNTER — TELEPHONE (OUTPATIENT)
Dept: OBSTETRICS AND GYNECOLOGY | Facility: CLINIC | Age: 37
End: 2022-10-27

## 2022-10-27 ENCOUNTER — PATIENT MESSAGE (OUTPATIENT)
Dept: OBSTETRICS AND GYNECOLOGY | Facility: CLINIC | Age: 37
End: 2022-10-27

## 2022-10-27 ENCOUNTER — CLINICAL SUPPORT (OUTPATIENT)
Dept: OBSTETRICS AND GYNECOLOGY | Facility: CLINIC | Age: 37
End: 2022-10-27
Payer: MEDICAID

## 2022-10-27 ENCOUNTER — SPECIALTY PHARMACY (OUTPATIENT)
Dept: PHARMACY | Facility: CLINIC | Age: 37
End: 2022-10-27
Payer: MEDICAID

## 2022-10-27 DIAGNOSIS — O09.899 HX OF PRETERM DELIVERY, CURRENTLY PREGNANT: Primary | ICD-10-CM

## 2022-10-27 PROCEDURE — 96372 THER/PROPH/DIAG INJ SC/IM: CPT | Mod: PBBFAC,PO

## 2022-10-27 RX ORDER — HYDROXYPROGESTERONE CAPROATE 250 MG/ML
250 INJECTION INTRAMUSCULAR
Qty: 1 ML | Refills: 3 | Status: SHIPPED | OUTPATIENT
Start: 2022-10-27 | End: 2022-12-20

## 2022-10-27 RX ADMIN — HYDROXYPROGESTERONE CAPROATE 250 MG: 250 INJECTION INTRAMUSCULAR at 10:10

## 2022-10-27 NOTE — TELEPHONE ENCOUNTER
Good Morning Agnes Lee came this morning not too long ago to get her scheduled weekly [place] injection. When she was here, she stated that she is going out of town to the week of Thanksgiving. Originally, she thought she would be back in time to come for her [place] injection the day after Thanksgiving, 11/25/22, to get her Beth injection that week. Patient asked if there is possibility that a Beth injection prescription can be sent to her pharmacy for her sister who is a NP to give it to her that week. I did not know if that would be a possibility so I wanted to reach out to you.    Please advise.    Thanks,    Nunu PHELPS

## 2022-10-27 NOTE — PROGRESS NOTES
Patient arrived to clinic for scheduled [place] injection. Injected 250 mg into RUOQG as per MD order. Patient tolerated well. No complaints before or after injection. Advised to wait in waiting room for 15 min to monitor for adverse reactions. Advised to return to clinic in one week for next [place] injection. Patient verbalized understanding.    Patient stated that she is going out of town to the week of Thanksgiving. Originally, patient thought she would be back in time to come for her [place] injection the day after Thanksgiving, 11/25/22, to get her [place] injection that week. Patient asked if there is possibility that a [place] injection prescription can be sent to her pharmacy for her sister who is a NP to give it to her that week. Will pass message along to Dr. Downs and notify patient when I hear back from Dr. Downs.

## 2022-10-27 NOTE — TELEPHONE ENCOUNTER
Sent rx to [facility] for PA approval  If not approved she can pay cash for it or just skip 1 wk and it won't make a difference  Also notify her the FDA recently withdrew approval for it to prevent  birth. So that may make it hard to get insurance to approve the Rx

## 2022-10-28 NOTE — TELEPHONE ENCOUNTER
PA submitted via epic. Case ID: [id number]     Lyla, this is Isadora Natarajan with [facility].  We are working on your prescription that your doctor has sent us. We will be working with your insurance to get this approved for you. We will be calling you along the way with updates on your medication.  If you have any questions, you can reach us at [phone].    Welcome call outcome: Patient/caregiver reached

## 2022-11-01 NOTE — TELEPHONE ENCOUNTER
MD confirmed for diagnosis code O09.213- pregnancy with a history of pre-term labor, third trimester.    Test claim: $0    [employer] [place] Medicaid: BI not required    Forward to initial

## 2022-11-04 NOTE — TELEPHONE ENCOUNTER
Message received from MD via secure chat stating Patient would like to stop [place] and to cancel.     Closing out referral at [facility].

## 2022-11-08 ENCOUNTER — PATIENT MESSAGE (OUTPATIENT)
Dept: OBSTETRICS AND GYNECOLOGY | Facility: CLINIC | Age: 37
End: 2022-11-08

## 2022-11-08 ENCOUNTER — LAB VISIT (OUTPATIENT)
Dept: LAB | Facility: HOSPITAL | Age: 37
End: 2022-11-08
Attending: OBSTETRICS & GYNECOLOGY
Payer: MEDICAID

## 2022-11-08 ENCOUNTER — ROUTINE PRENATAL (OUTPATIENT)
Dept: OBSTETRICS AND GYNECOLOGY | Facility: CLINIC | Age: 37
End: 2022-11-08
Payer: MEDICAID

## 2022-11-08 VITALS
BODY MASS INDEX: 21.91 KG/M2 | SYSTOLIC BLOOD PRESSURE: 108 MMHG | WEIGHT: 139.88 LBS | DIASTOLIC BLOOD PRESSURE: 66 MMHG

## 2022-11-08 DIAGNOSIS — Z3A.32 32 WEEKS GESTATION OF PREGNANCY: Primary | ICD-10-CM

## 2022-11-08 DIAGNOSIS — O03.2 INCOMPLETE MISCARRIAGE WITH BLOOD CLOT: ICD-10-CM

## 2022-11-08 DIAGNOSIS — Z3A.32 32 WEEKS GESTATION OF PREGNANCY: ICD-10-CM

## 2022-11-08 LAB — HCG INTACT+B SERPL-ACNC: NORMAL MIU/ML

## 2022-11-08 PROCEDURE — 84702 CHORIONIC GONADOTROPIN TEST: CPT | Performed by: OBSTETRICS & GYNECOLOGY

## 2022-11-08 PROCEDURE — 99999 PR PBB SHADOW E&M-EST. PATIENT-LVL II: ICD-10-PCS | Mod: PBBFAC,,, | Performed by: OBSTETRICS & GYNECOLOGY

## 2022-11-08 PROCEDURE — 86592 SYPHILIS TEST NON-TREP QUAL: CPT | Performed by: STUDENT IN AN ORGANIZED HEALTH CARE EDUCATION/TRAINING PROGRAM

## 2022-11-08 PROCEDURE — 99999 PR PBB SHADOW E&M-EST. PATIENT-LVL II: CPT | Mod: PBBFAC,,, | Performed by: OBSTETRICS & GYNECOLOGY

## 2022-11-08 PROCEDURE — 99213 OFFICE O/P EST LOW 20 MIN: CPT | Mod: TH,S$PBB,, | Performed by: OBSTETRICS & GYNECOLOGY

## 2022-11-08 PROCEDURE — 99212 OFFICE O/P EST SF 10 MIN: CPT | Mod: PBBFAC,PO | Performed by: OBSTETRICS & GYNECOLOGY

## 2022-11-08 PROCEDURE — 99213 PR OFFICE/OUTPT VISIT, EST, LEVL III, 20-29 MIN: ICD-10-PCS | Mod: TH,S$PBB,, | Performed by: OBSTETRICS & GYNECOLOGY

## 2022-11-08 PROCEDURE — 87389 HIV-1 AG W/HIV-1&-2 AB AG IA: CPT | Performed by: STUDENT IN AN ORGANIZED HEALTH CARE EDUCATION/TRAINING PROGRAM

## 2022-11-08 PROCEDURE — 36415 COLL VENOUS BLD VENIPUNCTURE: CPT | Performed by: STUDENT IN AN ORGANIZED HEALTH CARE EDUCATION/TRAINING PROGRAM

## 2022-11-08 NOTE — PROGRESS NOTES
Patient complaining of pain around rib cage R > L. Denies vaginal bleeding or contractions. Good FM. Patient still smoking 1/2 pack per day. Plan for  with BTL. RTC in 3 weeks.     Vitals signs, FHTs in 130s, urine dip, and PE findings documented, reviewed and available in OB flow chart.       We spent a total of 20 minutes on the day of the visit.This includes face to face time and non-face to face time preparing to see the patient (eg, review of tests), Obtaining and/or reviewing separately obtained history, Documenting clinical information in the electronic or other health record, Independently interpreting resultsand communicating results to the patient/family/caregiver, or Care coordination.     Jamie Khan MD  [facility] PGY-2  [place] THOMAS  2022 10:55 AM

## 2022-11-09 LAB
HIV 1+2 AB+HIV1 P24 AG SERPL QL IA: NORMAL
RPR SER QL: NORMAL

## 2022-11-29 ENCOUNTER — ROUTINE PRENATAL (OUTPATIENT)
Dept: OBSTETRICS AND GYNECOLOGY | Facility: CLINIC | Age: 37
End: 2022-11-29
Payer: MEDICAID

## 2022-11-29 VITALS
BODY MASS INDEX: 22.67 KG/M2 | WEIGHT: 144.75 LBS | DIASTOLIC BLOOD PRESSURE: 70 MMHG | SYSTOLIC BLOOD PRESSURE: 101 MMHG

## 2022-11-29 DIAGNOSIS — Z36.85 ENCOUNTER FOR ANTENATAL SCREENING FOR STREPTOCOCCUS B: ICD-10-CM

## 2022-11-29 DIAGNOSIS — Z3A.35 35 WEEKS GESTATION OF PREGNANCY: Primary | ICD-10-CM

## 2022-11-29 PROCEDURE — 99213 PR OFFICE/OUTPT VISIT, EST, LEVL III, 20-29 MIN: ICD-10-PCS | Mod: TH,S$PBB,, | Performed by: OBSTETRICS & GYNECOLOGY

## 2022-11-29 PROCEDURE — 99999 PR PBB SHADOW E&M-EST. PATIENT-LVL II: CPT | Mod: PBBFAC,,, | Performed by: OBSTETRICS & GYNECOLOGY

## 2022-11-29 PROCEDURE — 99999 PR PBB SHADOW E&M-EST. PATIENT-LVL II: ICD-10-PCS | Mod: PBBFAC,,, | Performed by: OBSTETRICS & GYNECOLOGY

## 2022-11-29 PROCEDURE — 99213 OFFICE O/P EST LOW 20 MIN: CPT | Mod: TH,S$PBB,, | Performed by: OBSTETRICS & GYNECOLOGY

## 2022-11-29 PROCEDURE — 99212 OFFICE O/P EST SF 10 MIN: CPT | Mod: PBBFAC,PO | Performed by: OBSTETRICS & GYNECOLOGY

## 2022-11-29 PROCEDURE — 87081 CULTURE SCREEN ONLY: CPT | Performed by: OBSTETRICS & GYNECOLOGY

## 2022-11-29 NOTE — PROGRESS NOTES
Patient with no complaints. Denies vaginal bleeding or contractions. Good FM. On exam, cervix closed, 70% effaced, fetus at -2 station.    Vitals signs, FHTs 140s, urine dip, and PE findings documented, reviewed and available in OB flow chart. RTC in 2 weeks.      I spent a total of 20 minutes on the day of the visit.This includes face to face time and non-face to face time preparing to see the patient (eg, review of tests), Obtaining and/or reviewing separately obtained history, Documenting clinical information in the electronic or other health record, Independently interpreting resultsand communicating results to the patient/family/caregiver, or Care coordination.     Jamie Khan MD  [facility] PGY-2  [place] [facility]

## 2022-12-02 LAB — BACTERIA SPEC AEROBE CULT: NORMAL

## 2022-12-13 ENCOUNTER — ROUTINE PRENATAL (OUTPATIENT)
Dept: OBSTETRICS AND GYNECOLOGY | Facility: CLINIC | Age: 37
End: 2022-12-13
Payer: MEDICAID

## 2022-12-13 VITALS
BODY MASS INDEX: 22.25 KG/M2 | WEIGHT: 142.06 LBS | SYSTOLIC BLOOD PRESSURE: 108 MMHG | DIASTOLIC BLOOD PRESSURE: 60 MMHG

## 2022-12-13 DIAGNOSIS — O26.843 UTERINE SIZE DATE DISCREPANCY PREGNANCY, THIRD TRIMESTER: ICD-10-CM

## 2022-12-13 DIAGNOSIS — Z3A.37 37 WEEKS GESTATION OF PREGNANCY: Primary | ICD-10-CM

## 2022-12-13 PROCEDURE — 99213 OFFICE O/P EST LOW 20 MIN: CPT | Mod: PBBFAC,TH,PO | Performed by: OBSTETRICS & GYNECOLOGY

## 2022-12-13 PROCEDURE — 99999 PR PBB SHADOW E&M-EST. PATIENT-LVL III: CPT | Mod: PBBFAC,,, | Performed by: OBSTETRICS & GYNECOLOGY

## 2022-12-13 PROCEDURE — 99213 OFFICE O/P EST LOW 20 MIN: CPT | Mod: TH,S$PBB,, | Performed by: OBSTETRICS & GYNECOLOGY

## 2022-12-13 PROCEDURE — 99999 PR PBB SHADOW E&M-EST. PATIENT-LVL III: ICD-10-PCS | Mod: PBBFAC,,, | Performed by: OBSTETRICS & GYNECOLOGY

## 2022-12-13 PROCEDURE — 99213 PR OFFICE/OUTPT VISIT, EST, LEVL III, 20-29 MIN: ICD-10-PCS | Mod: TH,S$PBB,, | Performed by: OBSTETRICS & GYNECOLOGY

## 2022-12-13 NOTE — PROGRESS NOTES
Doing ok, feel pressure low  fht's 140's   FH-34  cx LTC, head low    U/s for growth ordered  rtc 1 wk  Discussed being out of town btw xmas ans new years  Will keep c/s and BTL on 1/3/22  Patient with no complaints. Denies vaginal bleeding or contractions. Good FM. RTC in 1 week.     Vitals signs, FHTs, urine dip, and PE findings documented, reviewed and available in OB flow chart.       I spent a total of 20 minutes on the day of the visit.This includes face to face time and non-face to face time preparing to see the patient (eg, review of tests), Obtaining and/or reviewing separately obtained history, Documenting clinical information in the electronic or other health record, Independently interpreting resultsand communicating results to the patient/family/caregiver, or Care coordination.

## 2022-12-16 ENCOUNTER — PROCEDURE VISIT (OUTPATIENT)
Dept: OBSTETRICS AND GYNECOLOGY | Facility: CLINIC | Age: 37
End: 2022-12-16
Payer: MEDICAID

## 2022-12-16 DIAGNOSIS — Z3A.37 37 WEEKS GESTATION OF PREGNANCY: ICD-10-CM

## 2022-12-16 DIAGNOSIS — O26.843 UTERINE SIZE DATE DISCREPANCY PREGNANCY, THIRD TRIMESTER: ICD-10-CM

## 2022-12-16 PROCEDURE — 76816 US OB/GYN PROCEDURE (VIEWPOINT): ICD-10-PCS | Mod: 26,S$PBB,, | Performed by: OBSTETRICS & GYNECOLOGY

## 2022-12-16 PROCEDURE — 76816 OB US FOLLOW-UP PER FETUS: CPT | Mod: PBBFAC,PO | Performed by: OBSTETRICS & GYNECOLOGY

## 2022-12-20 ENCOUNTER — ROUTINE PRENATAL (OUTPATIENT)
Dept: OBSTETRICS AND GYNECOLOGY | Facility: CLINIC | Age: 37
End: 2022-12-20
Payer: MEDICAID

## 2022-12-20 ENCOUNTER — HOSPITAL ENCOUNTER (INPATIENT)
Facility: HOSPITAL | Age: 37
LOS: 3 days | Discharge: HOME | End: 2022-12-23
Attending: OBSTETRICS & GYNECOLOGY | Admitting: OBSTETRICS & GYNECOLOGY
Payer: MEDICAID

## 2022-12-20 ENCOUNTER — ANESTHESIA EVENT (OUTPATIENT)
Dept: OBSTETRICS AND GYNECOLOGY | Facility: HOSPITAL | Age: 37
End: 2022-12-20
Payer: MEDICAID

## 2022-12-20 VITALS — WEIGHT: 147.06 LBS | BODY MASS INDEX: 23.03 KG/M2 | SYSTOLIC BLOOD PRESSURE: 92 MMHG | DIASTOLIC BLOOD PRESSURE: 63 MMHG

## 2022-12-20 DIAGNOSIS — Z3A.38 38 WEEKS GESTATION OF PREGNANCY: Primary | ICD-10-CM

## 2022-12-20 DIAGNOSIS — O34.219 DELIVERY WITH HISTORY OF C-SECTION: ICD-10-CM

## 2022-12-20 LAB
ABO + RH BLD: NORMAL
ALLENS TEST: ABNORMAL
ALLENS TEST: ABNORMAL
BASOPHILS # BLD AUTO: 0.04 K/UL (ref 0–0.2)
BASOPHILS NFR BLD: 0.5 % (ref 0–1.9)
BLD GP AB SCN CELLS X3 SERPL QL: NORMAL
DELSYS: ABNORMAL
DELSYS: ABNORMAL
DIFFERENTIAL METHOD: ABNORMAL
EOSINOPHIL # BLD AUTO: 0.1 K/UL (ref 0–0.5)
EOSINOPHIL NFR BLD: 1.5 % (ref 0–8)
ERYTHROCYTE [DISTWIDTH] IN BLOOD BY AUTOMATED COUNT: 13.2 % (ref 11.5–14.5)
HCO3 UR-SCNC: 17.2 MMOL/L (ref 24–28)
HCO3 UR-SCNC: 23.5 MMOL/L (ref 24–28)
HCT VFR BLD AUTO: 41.8 % (ref 37–48.5)
HCT VFR BLD CALC: 46 %PCV (ref 36–54)
HCT VFR BLD CALC: 50 %PCV (ref 36–54)
HGB BLD-MCNC: 14.4 G/DL (ref 12–16)
HGB BLD-MCNC: 16 G/DL
HGB BLD-MCNC: 17 G/DL
IMM GRANULOCYTES # BLD AUTO: 0.05 K/UL (ref 0–0.04)
IMM GRANULOCYTES NFR BLD AUTO: 0.7 % (ref 0–0.5)
LYMPHOCYTES # BLD AUTO: 2.1 K/UL (ref 1–4.8)
LYMPHOCYTES NFR BLD: 27.6 % (ref 18–48)
MCH RBC QN AUTO: 33.2 PG (ref 27–31)
MCHC RBC AUTO-ENTMCNC: 34.4 G/DL (ref 32–36)
MCV RBC AUTO: 96 FL (ref 82–98)
MONOCYTES # BLD AUTO: 1 K/UL (ref 0.3–1)
MONOCYTES NFR BLD: 13.1 % (ref 4–15)
NEUTROPHILS # BLD AUTO: 4.3 K/UL (ref 1.8–7.7)
NEUTROPHILS NFR BLD: 56.6 % (ref 38–73)
NRBC BLD-RTO: 0 /100 WBC
PCO2 BLDA: 24.6 MMHG (ref 35–45)
PCO2 BLDA: 51.9 MMHG (ref 35–45)
PH SMN: 7.26 [PH] (ref 7.35–7.45)
PH SMN: 7.45 [PH] (ref 7.35–7.45)
PLATELET # BLD AUTO: 183 K/UL (ref 150–450)
PMV BLD AUTO: 11.4 FL (ref 9.2–12.9)
PO2 BLDA: 27 MMHG (ref 80–100)
PO2 BLDA: 51 MMHG (ref 80–100)
POC BE: -3 MMOL/L
POC BE: -7 MMOL/L
POC SATURATED O2: 41 % (ref 95–100)
POC SATURATED O2: 88 % (ref 95–100)
POC TCO2: 18 MMOL/L (ref 23–27)
POC TCO2: 25 MMOL/L (ref 23–27)
POTASSIUM BLD-SCNC: 4.2 MMOL/L (ref 3.5–5.1)
POTASSIUM BLD-SCNC: 4.7 MMOL/L (ref 3.5–5.1)
RBC # BLD AUTO: 4.34 M/UL (ref 4–5.4)
SAMPLE: ABNORMAL
SAMPLE: ABNORMAL
SITE: ABNORMAL
SITE: ABNORMAL
SODIUM BLD-SCNC: 130 MMOL/L (ref 136–145)
SODIUM BLD-SCNC: 136 MMOL/L (ref 136–145)
WBC # BLD AUTO: 7.54 K/UL (ref 3.9–12.7)

## 2022-12-20 PROCEDURE — 36004725 HC OB OR TIME LEV III - EA ADD 15 MIN: Mod: SZN

## 2022-12-20 PROCEDURE — 99900035 HC TECH TIME PER 15 MIN (STAT)

## 2022-12-20 PROCEDURE — 37000008 HC ANESTHESIA 1ST 15 MINUTES: Performed by: OBSTETRICS & GYNECOLOGY

## 2022-12-20 PROCEDURE — 99213 PR OFFICE/OUTPT VISIT, EST, LEVL III, 20-29 MIN: ICD-10-PCS | Mod: TH,S$PBB,, | Performed by: OBSTETRICS & GYNECOLOGY

## 2022-12-20 PROCEDURE — 59514 PRA REAN DELIVERY ONLY: ICD-10-PCS | Mod: QY,ANES,, | Performed by: ANESTHESIOLOGY

## 2022-12-20 PROCEDURE — 59514 CESAREAN DELIVERY ONLY: CPT | Mod: QY,ANES,, | Performed by: ANESTHESIOLOGY

## 2022-12-20 PROCEDURE — 58611 PR LIGATION,FALLOPIAN TUBE W/C-SECTION: ICD-10-PCS | Mod: ,,, | Performed by: OBSTETRICS & GYNECOLOGY

## 2022-12-20 PROCEDURE — 71000033 HC RECOVERY, INTIAL HOUR: Performed by: OBSTETRICS & GYNECOLOGY

## 2022-12-20 PROCEDURE — 58611 LIGATE OVIDUCT(S) ADD-ON: CPT | Mod: ,,, | Performed by: OBSTETRICS & GYNECOLOGY

## 2022-12-20 PROCEDURE — 11000001 HC ACUTE MED/SURG PRIVATE ROOM

## 2022-12-20 PROCEDURE — 36004725 HC OB OR TIME LEV III - EA ADD 15 MIN: Performed by: OBSTETRICS & GYNECOLOGY

## 2022-12-20 PROCEDURE — 86901 BLOOD TYPING SEROLOGIC RH(D): CPT | Performed by: OBSTETRICS & GYNECOLOGY

## 2022-12-20 PROCEDURE — 25000003 PHARM REV CODE 250: Performed by: OBSTETRICS & GYNECOLOGY

## 2022-12-20 PROCEDURE — 59514 PR CESAREAN DELIVERY ONLY: ICD-10-PCS | Mod: AT,,, | Performed by: OBSTETRICS & GYNECOLOGY

## 2022-12-20 PROCEDURE — 63600175 PHARM REV CODE 636 W HCPCS: Performed by: ANESTHESIOLOGY

## 2022-12-20 PROCEDURE — 51702 INSERT TEMP BLADDER CATH: CPT

## 2022-12-20 PROCEDURE — 59514 CESAREAN DELIVERY ONLY: CPT | Mod: QX,CRNA,, | Performed by: NURSE ANESTHETIST, CERTIFIED REGISTERED

## 2022-12-20 PROCEDURE — 25000003 PHARM REV CODE 250: Performed by: ANESTHESIOLOGY

## 2022-12-20 PROCEDURE — 71000039 HC RECOVERY, EACH ADD'L HOUR: Performed by: OBSTETRICS & GYNECOLOGY

## 2022-12-20 PROCEDURE — 63600175 PHARM REV CODE 636 W HCPCS: Performed by: OBSTETRICS & GYNECOLOGY

## 2022-12-20 PROCEDURE — 82803 BLOOD GASES ANY COMBINATION: CPT

## 2022-12-20 PROCEDURE — 37000009 HC ANESTHESIA EA ADD 15 MINS: Mod: SZN

## 2022-12-20 PROCEDURE — 99213 OFFICE O/P EST LOW 20 MIN: CPT | Mod: TH,S$PBB,, | Performed by: OBSTETRICS & GYNECOLOGY

## 2022-12-20 PROCEDURE — 63600175 PHARM REV CODE 636 W HCPCS: Performed by: NURSE ANESTHETIST, CERTIFIED REGISTERED

## 2022-12-20 PROCEDURE — 36415 COLL VENOUS BLD VENIPUNCTURE: CPT | Performed by: OBSTETRICS & GYNECOLOGY

## 2022-12-20 PROCEDURE — 59514 PRA REAN DELIVERY ONLY: ICD-10-PCS | Mod: QX,CRNA,, | Performed by: NURSE ANESTHETIST, CERTIFIED REGISTERED

## 2022-12-20 PROCEDURE — 25000003 PHARM REV CODE 250: Performed by: NURSE ANESTHETIST, CERTIFIED REGISTERED

## 2022-12-20 PROCEDURE — 36004724 HC OB OR TIME LEV III - 1ST 15 MIN: Performed by: OBSTETRICS & GYNECOLOGY

## 2022-12-20 PROCEDURE — 27201121 HC TRAY,SPINAL-HYPERBARIC: Performed by: ANESTHESIOLOGY

## 2022-12-20 PROCEDURE — 37000009 HC ANESTHESIA EA ADD 15 MINS: Performed by: OBSTETRICS & GYNECOLOGY

## 2022-12-20 PROCEDURE — 85025 COMPLETE CBC W/AUTO DIFF WBC: CPT | Performed by: OBSTETRICS & GYNECOLOGY

## 2022-12-20 PROCEDURE — 36416 COLLJ CAPILLARY BLOOD SPEC: CPT

## 2022-12-20 PROCEDURE — 99999 PR PBB SHADOW E&M-EST. PATIENT-LVL II: CPT | Mod: PBBFAC,,, | Performed by: OBSTETRICS & GYNECOLOGY

## 2022-12-20 PROCEDURE — 88302 TISSUE EXAM BY PATHOLOGIST: CPT | Mod: 26,,, | Performed by: PATHOLOGY

## 2022-12-20 PROCEDURE — 99999 PR PBB SHADOW E&M-EST. PATIENT-LVL II: ICD-10-PCS | Mod: PBBFAC,,, | Performed by: OBSTETRICS & GYNECOLOGY

## 2022-12-20 PROCEDURE — 72100002 HC LABOR CARE, 1ST 8 HOURS

## 2022-12-20 PROCEDURE — 88302 PR  SURG PATH,LEVEL II: ICD-10-PCS | Mod: 26,,, | Performed by: PATHOLOGY

## 2022-12-20 PROCEDURE — 88302 TISSUE EXAM BY PATHOLOGIST: CPT | Mod: 59 | Performed by: PATHOLOGY

## 2022-12-20 PROCEDURE — 59514 CESAREAN DELIVERY ONLY: CPT | Mod: AT,,, | Performed by: OBSTETRICS & GYNECOLOGY

## 2022-12-20 PROCEDURE — 99212 OFFICE O/P EST SF 10 MIN: CPT | Mod: PBBFAC,PO | Performed by: OBSTETRICS & GYNECOLOGY

## 2022-12-20 RX ORDER — MUPIROCIN 20 MG/G
OINTMENT TOPICAL
Status: CANCELLED | OUTPATIENT
Start: 2022-12-20

## 2022-12-20 RX ORDER — SODIUM CHLORIDE, SODIUM LACTATE, POTASSIUM CHLORIDE, CALCIUM CHLORIDE 600; 310; 30; 20 MG/100ML; MG/100ML; MG/100ML; MG/100ML
INJECTION, SOLUTION INTRAVENOUS CONTINUOUS PRN
Status: DISCONTINUED | OUTPATIENT
Start: 2022-12-20 | End: 2022-12-20

## 2022-12-20 RX ORDER — OXYTOCIN/RINGER'S LACTATE 30/500 ML
95 PLASTIC BAG, INJECTION (ML) INTRAVENOUS ONCE
Status: DISCONTINUED | OUTPATIENT
Start: 2022-12-20 | End: 2022-12-23 | Stop reason: HOSPADM

## 2022-12-20 RX ORDER — DIPHENHYDRAMINE HYDROCHLORIDE 50 MG/ML
12.5 INJECTION INTRAMUSCULAR; INTRAVENOUS
Status: DISCONTINUED | OUTPATIENT
Start: 2022-12-20 | End: 2022-12-23 | Stop reason: HOSPADM

## 2022-12-20 RX ORDER — GUAIFENESIN 100 MG/5ML
200 SOLUTION ORAL EVERY 4 HOURS PRN
Status: DISCONTINUED | OUTPATIENT
Start: 2022-12-20 | End: 2022-12-23 | Stop reason: HOSPADM

## 2022-12-20 RX ORDER — SODIUM CHLORIDE, SODIUM LACTATE, POTASSIUM CHLORIDE, CALCIUM CHLORIDE 600; 310; 30; 20 MG/100ML; MG/100ML; MG/100ML; MG/100ML
INJECTION, SOLUTION INTRAVENOUS CONTINUOUS
Status: DISCONTINUED | OUTPATIENT
Start: 2022-12-20 | End: 2022-12-23 | Stop reason: HOSPADM

## 2022-12-20 RX ORDER — SODIUM CHLORIDE 0.9 % (FLUSH) 0.9 %
10 SYRINGE (ML) INJECTION
Status: DISCONTINUED | OUTPATIENT
Start: 2022-12-20 | End: 2022-12-23 | Stop reason: HOSPADM

## 2022-12-20 RX ORDER — CARBOPROST TROMETHAMINE 250 UG/ML
250 INJECTION, SOLUTION INTRAMUSCULAR
Status: DISCONTINUED | OUTPATIENT
Start: 2022-12-20 | End: 2022-12-23 | Stop reason: HOSPADM

## 2022-12-20 RX ORDER — MUPIROCIN 20 MG/G
OINTMENT TOPICAL 2 TIMES DAILY
Status: DISCONTINUED | OUTPATIENT
Start: 2022-12-20 | End: 2022-12-23 | Stop reason: HOSPADM

## 2022-12-20 RX ORDER — PROCHLORPERAZINE EDISYLATE 5 MG/ML
5 INJECTION INTRAMUSCULAR; INTRAVENOUS EVERY 6 HOURS PRN
Status: DISCONTINUED | OUTPATIENT
Start: 2022-12-20 | End: 2022-12-23 | Stop reason: HOSPADM

## 2022-12-20 RX ORDER — BUPIVACAINE HYDROCHLORIDE 7.5 MG/ML
INJECTION, SOLUTION EPIDURAL; RETROBULBAR
Status: DISCONTINUED | OUTPATIENT
Start: 2022-12-20 | End: 2022-12-20

## 2022-12-20 RX ORDER — SIMETHICONE 80 MG
1 TABLET,CHEWABLE ORAL EVERY 6 HOURS PRN
Status: DISCONTINUED | OUTPATIENT
Start: 2022-12-20 | End: 2022-12-23 | Stop reason: HOSPADM

## 2022-12-20 RX ORDER — KETOROLAC TROMETHAMINE 30 MG/ML
30 INJECTION, SOLUTION INTRAMUSCULAR; INTRAVENOUS EVERY 6 HOURS
Status: COMPLETED | OUTPATIENT
Start: 2022-12-20 | End: 2022-12-21

## 2022-12-20 RX ORDER — AMOXICILLIN 250 MG
1 CAPSULE ORAL NIGHTLY PRN
Status: DISCONTINUED | OUTPATIENT
Start: 2022-12-20 | End: 2022-12-23 | Stop reason: HOSPADM

## 2022-12-20 RX ORDER — METHYLERGONOVINE MALEATE 0.2 MG/ML
200 INJECTION INTRAVENOUS
Status: DISCONTINUED | OUTPATIENT
Start: 2022-12-20 | End: 2022-12-23 | Stop reason: HOSPADM

## 2022-12-20 RX ORDER — PHENYLEPHRINE HYDROCHLORIDE 10 MG/ML
INJECTION INTRAVENOUS
Status: DISCONTINUED | OUTPATIENT
Start: 2022-12-20 | End: 2022-12-20

## 2022-12-20 RX ORDER — FENTANYL CITRATE 50 UG/ML
INJECTION, SOLUTION INTRAMUSCULAR; INTRAVENOUS
Status: DISCONTINUED | OUTPATIENT
Start: 2022-12-20 | End: 2022-12-20

## 2022-12-20 RX ORDER — OXYCODONE AND ACETAMINOPHEN 10; 325 MG/1; MG/1
1 TABLET ORAL EVERY 4 HOURS PRN
Status: DISCONTINUED | OUTPATIENT
Start: 2022-12-20 | End: 2022-12-23 | Stop reason: HOSPADM

## 2022-12-20 RX ORDER — DEXMEDETOMIDINE HYDROCHLORIDE 100 UG/ML
INJECTION, SOLUTION INTRAVENOUS
Status: DISCONTINUED | OUTPATIENT
Start: 2022-12-20 | End: 2022-12-20

## 2022-12-20 RX ORDER — MIDAZOLAM HYDROCHLORIDE 1 MG/ML
INJECTION, SOLUTION INTRAMUSCULAR; INTRAVENOUS
Status: DISCONTINUED | OUTPATIENT
Start: 2022-12-20 | End: 2022-12-20

## 2022-12-20 RX ORDER — DIPHENHYDRAMINE HCL 25 MG
25 CAPSULE ORAL EVERY 4 HOURS PRN
Status: DISCONTINUED | OUTPATIENT
Start: 2022-12-20 | End: 2022-12-23 | Stop reason: HOSPADM

## 2022-12-20 RX ORDER — ACETAMINOPHEN 325 MG/1
650 TABLET ORAL EVERY 6 HOURS
Status: DISPENSED | OUTPATIENT
Start: 2022-12-20 | End: 2022-12-21

## 2022-12-20 RX ORDER — HYDROCORTISONE 25 MG/G
CREAM TOPICAL 3 TIMES DAILY PRN
Status: DISCONTINUED | OUTPATIENT
Start: 2022-12-20 | End: 2022-12-23 | Stop reason: HOSPADM

## 2022-12-20 RX ORDER — SODIUM CITRATE AND CITRIC ACID MONOHYDRATE 334; 500 MG/5ML; MG/5ML
30 SOLUTION ORAL
Status: DISCONTINUED | OUTPATIENT
Start: 2022-12-20 | End: 2022-12-23 | Stop reason: HOSPADM

## 2022-12-20 RX ORDER — BISACODYL 10 MG
10 SUPPOSITORY, RECTAL RECTAL ONCE AS NEEDED
Status: DISCONTINUED | OUTPATIENT
Start: 2022-12-20 | End: 2022-12-23 | Stop reason: HOSPADM

## 2022-12-20 RX ORDER — BUPIVACAINE HYDROCHLORIDE 2.5 MG/ML
30 INJECTION, SOLUTION EPIDURAL; INFILTRATION; INTRACAUDAL ONCE
Status: DISCONTINUED | OUTPATIENT
Start: 2022-12-20 | End: 2022-12-23 | Stop reason: HOSPADM

## 2022-12-20 RX ORDER — DIPHENHYDRAMINE HYDROCHLORIDE 50 MG/ML
25 INJECTION INTRAMUSCULAR; INTRAVENOUS EVERY 4 HOURS PRN
Status: DISCONTINUED | OUTPATIENT
Start: 2022-12-20 | End: 2022-12-20

## 2022-12-20 RX ORDER — ONDANSETRON 2 MG/ML
4 INJECTION INTRAMUSCULAR; INTRAVENOUS EVERY 6 HOURS PRN
Status: DISCONTINUED | OUTPATIENT
Start: 2022-12-20 | End: 2022-12-20

## 2022-12-20 RX ORDER — IBUPROFEN 400 MG/1
800 TABLET ORAL 3 TIMES DAILY
Status: DISCONTINUED | OUTPATIENT
Start: 2022-12-21 | End: 2022-12-23 | Stop reason: HOSPADM

## 2022-12-20 RX ORDER — MISOPROSTOL 200 UG/1
800 TABLET ORAL
Status: DISCONTINUED | OUTPATIENT
Start: 2022-12-20 | End: 2022-12-23 | Stop reason: HOSPADM

## 2022-12-20 RX ORDER — KETOROLAC TROMETHAMINE 30 MG/ML
INJECTION, SOLUTION INTRAMUSCULAR; INTRAVENOUS
Status: DISCONTINUED | OUTPATIENT
Start: 2022-12-20 | End: 2022-12-20

## 2022-12-20 RX ORDER — OXYCODONE HYDROCHLORIDE 5 MG/1
10 TABLET ORAL EVERY 4 HOURS PRN
Status: DISCONTINUED | OUTPATIENT
Start: 2022-12-20 | End: 2022-12-23 | Stop reason: HOSPADM

## 2022-12-20 RX ORDER — MORPHINE SULFATE 1 MG/ML
INJECTION, SOLUTION EPIDURAL; INTRATHECAL; INTRAVENOUS
Status: DISCONTINUED | OUTPATIENT
Start: 2022-12-20 | End: 2022-12-20

## 2022-12-20 RX ORDER — ONDANSETRON 8 MG/1
8 TABLET, ORALLY DISINTEGRATING ORAL EVERY 8 HOURS PRN
Status: DISCONTINUED | OUTPATIENT
Start: 2022-12-20 | End: 2022-12-23 | Stop reason: HOSPADM

## 2022-12-20 RX ORDER — NALOXONE HCL 0.4 MG/ML
0.04 VIAL (ML) INJECTION EVERY 5 MIN PRN
Status: DISCONTINUED | OUTPATIENT
Start: 2022-12-20 | End: 2022-12-23 | Stop reason: HOSPADM

## 2022-12-20 RX ORDER — ONDANSETRON 2 MG/ML
INJECTION INTRAMUSCULAR; INTRAVENOUS
Status: DISCONTINUED | OUTPATIENT
Start: 2022-12-20 | End: 2022-12-20

## 2022-12-20 RX ORDER — ADHESIVE BANDAGE
10 BANDAGE TOPICAL EVERY 6 HOURS PRN
Status: DISCONTINUED | OUTPATIENT
Start: 2022-12-21 | End: 2022-12-23 | Stop reason: HOSPADM

## 2022-12-20 RX ORDER — OXYTOCIN/RINGER'S LACTATE 30/500 ML
334 PLASTIC BAG, INJECTION (ML) INTRAVENOUS ONCE
Status: COMPLETED | OUTPATIENT
Start: 2022-12-20 | End: 2022-12-20

## 2022-12-20 RX ORDER — CLINDAMYCIN PHOSPHATE 900 MG/50ML
900 INJECTION, SOLUTION INTRAVENOUS
Status: COMPLETED | OUTPATIENT
Start: 2022-12-20 | End: 2022-12-20

## 2022-12-20 RX ORDER — PROCHLORPERAZINE EDISYLATE 5 MG/ML
5 INJECTION INTRAMUSCULAR; INTRAVENOUS EVERY 6 HOURS PRN
Status: DISCONTINUED | OUTPATIENT
Start: 2022-12-20 | End: 2022-12-20

## 2022-12-20 RX ORDER — FAMOTIDINE 10 MG/ML
20 INJECTION INTRAVENOUS
Status: DISCONTINUED | OUTPATIENT
Start: 2022-12-20 | End: 2022-12-23 | Stop reason: HOSPADM

## 2022-12-20 RX ORDER — OXYCODONE AND ACETAMINOPHEN 5; 325 MG/1; MG/1
1 TABLET ORAL EVERY 4 HOURS PRN
Status: DISCONTINUED | OUTPATIENT
Start: 2022-12-20 | End: 2022-12-23 | Stop reason: HOSPADM

## 2022-12-20 RX ORDER — PROMETHAZINE HYDROCHLORIDE 25 MG/ML
INJECTION, SOLUTION INTRAMUSCULAR; INTRAVENOUS
Status: DISCONTINUED | OUTPATIENT
Start: 2022-12-20 | End: 2022-12-20

## 2022-12-20 RX ADMIN — GUAIFENESIN 200 MG: 200 SOLUTION ORAL at 09:12

## 2022-12-20 RX ADMIN — SODIUM CHLORIDE, SODIUM LACTATE, POTASSIUM CHLORIDE, AND CALCIUM CHLORIDE: .6; .31; .03; .02 INJECTION, SOLUTION INTRAVENOUS at 04:12

## 2022-12-20 RX ADMIN — DEXMEDETOMIDINE HCL 5 MCG: 100 INJECTION INTRAVENOUS at 04:12

## 2022-12-20 RX ADMIN — BUPIVACAINE HYDROCHLORIDE 1.8 ML: 7.5 INJECTION, SOLUTION EPIDURAL; RETROBULBAR at 04:12

## 2022-12-20 RX ADMIN — PHENYLEPHRINE HYDROCHLORIDE 100 MCG: 10 INJECTION INTRAVENOUS at 04:12

## 2022-12-20 RX ADMIN — FENTANYL CITRATE 100 MCG: 50 INJECTION, SOLUTION INTRAMUSCULAR; INTRAVENOUS at 05:12

## 2022-12-20 RX ADMIN — MIDAZOLAM 2 MG: 1 INJECTION INTRAMUSCULAR; INTRAVENOUS at 05:12

## 2022-12-20 RX ADMIN — MORPHINE SULFATE 0.1 MG: 1 INJECTION, SOLUTION EPIDURAL; INTRATHECAL; INTRAVENOUS at 04:12

## 2022-12-20 RX ADMIN — SODIUM CHLORIDE, SODIUM LACTATE, POTASSIUM CHLORIDE, AND CALCIUM CHLORIDE: .6; .31; .03; .02 INJECTION, SOLUTION INTRAVENOUS at 06:12

## 2022-12-20 RX ADMIN — KETOROLAC TROMETHAMINE 30 MG: 30 INJECTION, SOLUTION INTRAMUSCULAR; INTRAVENOUS at 11:12

## 2022-12-20 RX ADMIN — KETOROLAC TROMETHAMINE 30 MG: 30 INJECTION, SOLUTION INTRAMUSCULAR; INTRAVENOUS at 05:12

## 2022-12-20 RX ADMIN — Medication 334 MILLI-UNITS/MIN: at 05:12

## 2022-12-20 RX ADMIN — OXYCODONE HYDROCHLORIDE 10 MG: 5 TABLET ORAL at 07:12

## 2022-12-20 RX ADMIN — FAMOTIDINE 20 MG: 10 INJECTION, SOLUTION INTRAVENOUS at 03:12

## 2022-12-20 RX ADMIN — ACETAMINOPHEN 650 MG: 325 TABLET ORAL at 11:12

## 2022-12-20 RX ADMIN — ONDANSETRON 4 MG: 2 INJECTION, SOLUTION INTRAMUSCULAR; INTRAVENOUS at 04:12

## 2022-12-20 RX ADMIN — PROMETHAZINE HYDROCHLORIDE 6.25 MG: 25 INJECTION INTRAMUSCULAR; INTRAVENOUS at 05:12

## 2022-12-20 RX ADMIN — PHENYLEPHRINE HYDROCHLORIDE 200 MCG: 10 INJECTION INTRAVENOUS at 04:12

## 2022-12-20 RX ADMIN — CLINDAMYCIN PHOSPHATE 900 MG: 900 INJECTION, SOLUTION INTRAVENOUS at 03:12

## 2022-12-20 RX ADMIN — MUPIROCIN: 20 OINTMENT TOPICAL at 08:12

## 2022-12-20 NOTE — RESPIRATORY THERAPY
[employer] Results: 12/20/22    Venous: 17:37    pH 7.45    CO2: 24.6    PO2: 51    BE: -7    HCO3: 17.2    sO2: 88      Arterial: 17:41    pH: 7.26    CO2: 51.9    PO2: 27    BE: -3    HCO3: 23.5    sO2: 41

## 2022-12-20 NOTE — ANESTHESIA PREPROCEDURE EVALUATION
12/20/2022  Agnes Alonso is a 37 y.o., female.      Pre-op Assessment    I have reviewed the Patient Summary Reports.     I have reviewed the Nursing Notes.    I have reviewed the Medications.     Review of Systems  Anesthesia Hx:  No previous Anesthesia    Social:  Smoker    Hematology/Oncology:  Hematology Normal   Oncology Normal     EENT/Dental:EENT/Dental Normal   Cardiovascular:   Exercise tolerance: good NYHA Classification I    Pulmonary:  Pulmonary Normal    Renal/:  Renal/ Normal     Hepatic/GI:  Hepatic/GI Normal Hepatitis, C    Musculoskeletal:   Lumbar spondylosis, scoliosis Spine Disorders: Chronic Pain    OB/GYN/PEDS:  Pt's Obstetrician is glenroy.   Neurological:   Headaches    Endocrine:  Endocrine Normal    Dermatological:  Skin Normal    Psych:   anxiety          Physical Exam  General: Well nourished and Cooperative    Airway:  Mallampati: II   Mouth Opening: Normal  Tongue: Normal  Neck ROM: Normal ROM    Dental:  Intact    Chest/Lungs:  Clear to auscultation, Normal Respiratory Rate    Heart:  Rate: Normal  Rhythm: Regular Rhythm        Anesthesia Plan  Type of Anesthesia, risks & benefits discussed:    Anesthesia Type: Spinal  Intra-op Monitoring Plan: Standard ASA Monitors  Post Op Pain Control Plan: multimodal analgesia  Informed Consent: Informed consent signed with the Patient and all parties understand the risks and agree with anesthesia plan.  All questions answered. Patient consented to blood products? Yes  ASA Score: 2  Day of Surgery Review of History & Physical: I have interviewed and examined the patient. I have reviewed the patient's H&P dated: There are no significant changes.     Ready For Surgery From Anesthesia Perspective.     .

## 2022-12-20 NOTE — ANESTHESIA PROCEDURE NOTES
Spinal    Diagnosis: repeat csection, iup, active labor  Patient location during procedure: OR  Start time: 12/20/2022 4:35 PM  Timeout: 12/20/2022 4:34 PM  End time: 12/20/2022 4:39 PM    Staffing  Authorizing Provider: Rod Lee MD  Performing Provider: Dez Dey Jr., CRNA    Preanesthetic Checklist  Completed: patient identified, IV checked, site marked, risks and benefits discussed, surgical consent, monitors and equipment checked, pre-op evaluation and timeout performed  Spinal Block  Patient position: sitting  Prep: Betadine  Patient monitoring: heart rate, cardiac monitor and continuous pulse ox  Approach: midline  Location: L3-4  Injection technique: single shot  CSF Fluid: clear free-flowing CSF  Needle  Needle type: Jason   Needle gauge: 25 G  Needle length: 3.5 in  Additional Documentation: no paresthesia on injection and negative aspiration for heme  Needle localization: anatomical landmarks  Assessment  Sensory level: T5   Dermatomal levels determined by alcohol wipe and pinch or prick  Ease of block: easy  Patient's tolerance of the procedure: comfortable throughout block and no complaints

## 2022-12-20 NOTE — PROGRESS NOTES
Doing ok, having ctx's  fht's 120-30's  Having discomfort in office with ctx's  cx 1/100/0 definite change from last visit  To L&D for c/s and BTL

## 2022-12-20 NOTE — L&D DELIVERY NOTE
[facility] - Labor & Delivery   Section   Operative Note    SUMMARY     Date of Procedure: 2022     Procedure: Procedure(s) (LRB):   SECTION, WITH TUBAL LIGATION (Bilateral)    Surgeon(s) and Role:     * Salvatore Downs MD - Primary    Assisting Surgeon: Susan Solis    Pre-Operative Diagnosis: 38weeks gestation of pregnancy [Z3A.40]  Early labor, prev c/s x 1, desires BTL, AMA,  Post-Operative Diagnosis: same as preop    Anesthesia: Spinal/Epidural             Description of the Findings of the Procedure: nml anatomy        Complications: No    Blood Loss: * No values recorded between 2022  4:53 PM and 2022  5:48 PM *     With patient in supine position, the legs are  and Manzano Catheter placed and positioning to supine done.   Abdomen prepped with Chloroprep and 3 minute drying time allowed prior to draping of the abdomen.   Time out taken with OR team members.  Pfannenstiel Incision made through the skin, transverse fascial incision developed, rectus muscles  in the midline and the peritoneum entered.   no adhesions noted.  The lower uterine segment and position of the fetus identified.   Bladder flap taken down through transverse peritoneal incision.    Low Transverse Incision made through well developer lower uterine segment and extended laterally with blunt dissection.   Clear fluid noted.  Infant delivered from vertex presentation.  Cord clamped after one minute and  handed to attending nurse.  Cord blood taken, placenta delivered.  The uterus was exteriorized.  The edges of the uterine incision are grasped with Engle clamps at the angles and the inferior and superior midline edges of the incision.    Closure with running lock 0 Vicryl starting at each angle, tying in the midline.   Observation for bleeding with suture of any bleeding along the hysterotomy line.   With good hemostasis noted, the anterior pelvis is rinsed with sterile saline.    Right and left adnexa with normal anatomy. Rt and left tubes were grasped with kristin clamps and 2-0 chromic suture passed through mesosalpinx and knuckle of segment of rt and left tubes ligated and excised. Hemostasis was good     Closure of the abdomen with 2 0 Vicryl running of the peritoneum, fascial closure with #1 vicryl  Skin closure with 4 0 Vicryl subcuticular.  Wound dressed with Aquacell.          Specimens:   Specimen (24h ago, onward)       Start     Ordered    22  Specimen to Pathology, Surgery Gynecology and Obstetrics  Once        Comments: Pre-op Diagnosis: 40 weeks gestation of pregnancy [Z3A.40]Procedure(s): SECTION, WITH TUBAL LIGATION Number of specimens: 2Name of specimens: left fallopian tube segment, right fallopian tube segment     References:    Click here for ordering Quick Tip   Question Answer Comment   Procedure Type: Gynecology and Obstetrics    Specimen Class: Routine/Screening    Which provider would you like to cc? EDWARD OG    Release to patient Immediate        22                    Condition: Good    Disposition: PACU - hemodynamically stable.    Attestation: Good         Delivery Information for Jose Alonso    Birth information:  YOB: 2023   Time of birth: 5:04 PM   Sex: male   Head Delivery Date/Time: 2022  5:04 PM   Delivery type: , Low Transverse   Gestational Age: 38w0d    Delivery Providers    Delivering clinician: Edward Og MD   Provider Role    MARY Matamoros ST Stricker Josie Shine, MARY Naqvi RN               Measurements    Weight: 3100 g  Weight (lbs): 6 lb 13.4 oz  Length:          Apgars    Living status: Living  Apgars:  1 min.:  5 min.:  10 min.:  15 min.:  20 min.:    Skin color:  0  1       Heart rate:  2  2       Reflex irritability:  2  2       Muscle tone:  2  2       Respiratory effort:  1  1       Total:  7  8        Apgars assigned by: JOANN YAP RN         Operative Delivery    Forceps attempted?: No  Vacuum extractor attempted?: No         Shoulder Dystocia    Shoulder dystocia present?: No           Presentation    Presentation: Vertex  Position: Left           Interventions/Resuscitation    Method: Bulb Suctioning, Blow By Oxygen, Tactile Stimulation       Cord    Vessels: 3 vessels  Complications: None  Delayed Cord Clamping?: No  Cord Blood Disposition: Lab, Sent with Baby  Gases Sent?: No  Stem Cell Collection (by MD): No       Placenta    Placenta delivery date/time: 2022 170  Placenta removal: Manual removal  Placenta appearance: Intact  Placenta disposition: discarded           Labor Events:       labor:       Labor Onset Date/Time:         Dilation Complete Date/Time:         Start Pushing Date/Time:         Start Pushing Date/Time:       Rupture Date/Time:            Rupture type:            Fluid Amount:         Fluid Color:         Fluid Odor:         Membrane Status:                  steroids:       Antibiotics given for GBS:       Induction:       Indications for induction:        Augmentation:       Indications for augmentation:       Labor complications:       Additional complications:          Cervical ripening:                     Delivery:      Episiotomy:       Indication for Episiotomy:       Perineal Lacerations:   Repaired:      Periurethral Laceration:   Repaired:     Labial Laceration:   Repaired:     Sulcus Laceration:   Repaired:     Vaginal Laceration:   Repaired:     Cervical Laceration:   Repaired:     Repair suture:       Repair # of packets:       Last Value - EBL - Nursing (mL):       Sum - EBL - Nursing (mL): 0     Last Value - EBL - Anesthesia (mL):        Calculated QBL (mL):         Vaginal Sweep Performed:       Surgicount Correct:         Other providers:       Anesthesia    Method: Spinal          Details (if applicable):  Trial of Labor No     Categorization: Repeat    Priority: Urgent   Indications for : Repeat Section   Incision Type: low transverse     Additional  information:  Forceps:    Vacuum:    Breech:    Observed anomalies    Other (Comments):

## 2022-12-20 NOTE — TRANSFER OF CARE
"Anesthesia Transfer of Care Note    Patient: Agnes Alonso    Procedure(s) Performed: Procedure(s) (LRB):   SECTION, WITH TUBAL LIGATION (Bilateral)    Patient location: Labor and Delivery    Anesthesia Type: spinal    Transport from OR: Transported from OR on room air with adequate spontaneous ventilation    Post pain: adequate analgesia    Post assessment: no apparent anesthetic complications    Post vital signs: stable    Level of consciousness: awake    Nausea/Vomiting: no nausea/vomiting    Complications: none    Transfer of care protocol was followed      Last vitals:   Visit Vitals  BP (!) 99/50   Pulse 79   Ht 5' 7" (1.702 m)   Wt 66.7 kg (147 lb)   LMP 2022 (Exact Date)   SpO2 98%   Breastfeeding No   BMI 23.02 kg/m²     "

## 2022-12-20 NOTE — H&P
History and Physical  Obstetrics      SUBJECTIVE:     Agnes Alonso is a 37 y.o.  female with an Estimated Date of Delivery: 1/3/23 admitted for  with tubal sterilization.  Her current obstetrical history is significant for advanced maternal age, prior , desire for tubal sterilization.  She reports contractions since yesterday . Fetal Movement: normal.    Facility-Administered Medications Prior to Admission   Medication    HYDROXYprogesterone (SALLY) injection 250 mg     PTA Medications   Medication Sig    acetaminophen (TYLENOL) 500 MG tablet Take 1,000 mg by mouth every 6 (six) hours as needed for Pain.    prenatal 25/iron fum/folic/dha (PRENATAL-1 ORAL) Take by mouth.       Review of patient's allergies indicates:   Allergen Reactions    Penicillins Hives        Past Medical History:   Diagnosis Date    Anxiety     Arthritis     Chronic hepatitis C - LOST TO F/U     s/p Rx but lost to f/u. needs HCV RNA to see if cured. JScheuermann PA-C [facility] 2021    Depression     Digestive disorder     Herpes genitalia      Past Surgical History:   Procedure Laterality Date     SECTION      INJECTION OF ANESTHETIC AGENT AROUND MEDIAL BRANCH NERVES INNERVATING LUMBAR FACET JOINT Bilateral 2020    Procedure: Block-nerve-medial branch-lumbar--Bilateral L4-5, L5-S1;  Surgeon: Lisbeth Noriega Jr., MD;  Location: [facility];  Service: Pain Management;  Laterality: Bilateral;    INJECTION OF ANESTHETIC AGENT AROUND MEDIAL BRANCH NERVES INNERVATING LUMBAR FACET JOINT Bilateral 9/3/2020    Procedure: Block-nerve-medial branch-lumbar--Bilateral L4-5, L5-S1;  Surgeon: Lisbeth Noriega Jr., MD;  [place] [facility] [place] [facility]  Service: Pain Management;  Laterality: Bilateral;    MARSUPIALIZATION OF BARTHOLIN'S GLAND CYST N/A 3/29/2021    Procedure: MARSUPIALIZATION, CYST, BARTHOLIN'S GLAND;  Surgeon: Julie R. Jeansonne, MD;  Location: [facility];  Service: OB/GYN;  Laterality: N/A;     RADIOFREQUENCY THERMOCOAGULATION Right 9/17/2020    Procedure: RADIOFREQUENCY THERMAL COAGULATION--Right L4-5, L5-S1;  Surgeon: Lisbeth Noriega Jr., MD;  Location: [facility];  Service: Pain Management;  Laterality: Right;    RADIOFREQUENCY THERMOCOAGULATION Left 9/24/2020    Procedure: RADIOFREQUENCY THERMAL COAGULATION--Left L4-5, L5-S1;  Surgeon: Lisbeth Noriega Jr., MD;  Location: [facility];  Service: Pain Management;  Laterality: Left;    RADIOFREQUENCY THERMOCOAGULATION Right 3/25/2021    Procedure: RADIOFREQUENCY THERMAL COAGULATION--Right L4-5, L5-S1;  Surgeon: Lisbeth Noriega Jr., MD;  Location: [facility];  Service: Pain Management;  Laterality: Right;    RADIOFREQUENCY THERMOCOAGULATION Left 4/1/2021    Procedure: RADIOFREQUENCY THERMAL COAGULATION--Left L4-5, L5-S1;  Surgeon: Lisbeth Noriega Jr., MD;  Location: [facility];  Service: Pain Management;  Laterality: Left;     Family History   Problem Relation Age of Onset    Depression Mother     Hypertension Mother     Depression Sister     Hypertension Paternal Grandmother     Diabetes Maternal Uncle     Breast cancer Neg Hx     Ovarian cancer Neg Hx      Social History     Tobacco Use    Smoking status: Every Day     Packs/day: 1.00     Types: Cigarettes    Smokeless tobacco: Never   Substance Use Topics    Alcohol use: Not Currently    Drug use: Never     ROS:  GENERAL: Feeling well overall. Denies fever or chills.   SKIN: Denies rash or lesions.   HEAD: Denies head injury or headache.   NODES: Denies enlarged lymph nodes.   CHEST: Denies chest pain or shortness of breath.   CARDIOVASCULAR: Denies palpitations or left sided chest pain.    ABDOMEN: Denies diarrhea, nausea, vomiting or rectal bleeding.   URINARY: No dysuria, hematuria, or burning on urination.  REPRODUCTIVE: See HPI.   BREASTS: Denies pain, lumps, or nipple discharge.   HEMATOLOGIC: No easy bruisability or excessive bleeding.   MUSCULOSKELETAL: Denies joint pain or  swelling.   NEUROLOGIC: Denies syncope or weakness.   PSYCHIATRIC: Denies depression, anxiety or mood swings.         Vital Signs (Most Recent)       Physical Exam:  General:  alert and normal appearing gravid female   Skin:  Skin color, texture, turgor normal. No rashes or lesions   HEENT:  conjunctivae/corneas clear. PERRL.   Lungs:  clear to auscultation bilaterally   Heart:  regular rate and rhythm       Abdomen:  soft, non-tender; bowel sounds normal   Uterine Size:  size equals dates   Presentations:  cephalic   FHT:  120-30's BPM   Pelvis: Exam deferred.   Cervix:     Dilation: 1cm    Effacement: 100%    Station:  0    Consistency: soft    Position: anterior     Laboratory:  No results for input(s): ABORH, HEPBSAG, RUBELLAIGGSC, GBS, AFP, GPCGPLA7OI, CBC in the last 168 hours.   ASSESSMENT/PLAN:     38w0d gestation.  Early latent labor.     Conditions: Advanced Maternal Age, Prior , and Undesired Fertility    Patient Active Problem List   Diagnosis    Chronic lower back pain    Dorsalgia, unspecified    Bilateral edema of lower extremity    Amenorrhea    Gastroenteritis    Chronic bilateral low back pain without sciatica    Chronic bilateral thoracic back pain    Other idiopathic scoliosis, thoracolumbar region    Herpes genitalia    History of illicit drug use    Cigarette nicotine dependence without complication    Generalized headaches    HSV infection    Cervical syndrome    Chronic pain due to trauma    Chronic pain    Lumbar spondylosis    Bartholin's gland cyst    Chronic hepatitis C - LOST TO F/U        Risks, benefits, alternatives and possible complications have been discussed in detail with the patient.  Pre-admission, admission, and post admission procedures and expectations were discussed in detail.  All questions answered, all appropriate consents will be signed at the Hospital. Admission is planned for today.   Rpt c/s and BTL

## 2022-12-21 LAB
BASOPHILS # BLD AUTO: 0.03 K/UL (ref 0–0.2)
BASOPHILS NFR BLD: 0.4 % (ref 0–1.9)
DIFFERENTIAL METHOD: ABNORMAL
EOSINOPHIL # BLD AUTO: 0.1 K/UL (ref 0–0.5)
EOSINOPHIL NFR BLD: 1.9 % (ref 0–8)
ERYTHROCYTE [DISTWIDTH] IN BLOOD BY AUTOMATED COUNT: 13.1 % (ref 11.5–14.5)
HCT VFR BLD AUTO: 36.6 % (ref 37–48.5)
HGB BLD-MCNC: 12.4 G/DL (ref 12–16)
IMM GRANULOCYTES # BLD AUTO: 0.03 K/UL (ref 0–0.04)
IMM GRANULOCYTES NFR BLD AUTO: 0.4 % (ref 0–0.5)
LYMPHOCYTES # BLD AUTO: 1.5 K/UL (ref 1–4.8)
LYMPHOCYTES NFR BLD: 21.9 % (ref 18–48)
MCH RBC QN AUTO: 33 PG (ref 27–31)
MCHC RBC AUTO-ENTMCNC: 33.9 G/DL (ref 32–36)
MCV RBC AUTO: 97 FL (ref 82–98)
MONOCYTES # BLD AUTO: 0.8 K/UL (ref 0.3–1)
MONOCYTES NFR BLD: 11.3 % (ref 4–15)
NEUTROPHILS # BLD AUTO: 4.5 K/UL (ref 1.8–7.7)
NEUTROPHILS NFR BLD: 64.1 % (ref 38–73)
NRBC BLD-RTO: 0 /100 WBC
PLATELET # BLD AUTO: 143 K/UL (ref 150–450)
PMV BLD AUTO: 11.4 FL (ref 9.2–12.9)
RBC # BLD AUTO: 3.76 M/UL (ref 4–5.4)
WBC # BLD AUTO: 6.99 K/UL (ref 3.9–12.7)

## 2022-12-21 PROCEDURE — 11000001 HC ACUTE MED/SURG PRIVATE ROOM

## 2022-12-21 PROCEDURE — 99231 PR SUBSEQUENT HOSPITAL CARE,LEVL I: ICD-10-PCS | Mod: ,,, | Performed by: OBSTETRICS & GYNECOLOGY

## 2022-12-21 PROCEDURE — 94761 N-INVAS EAR/PLS OXIMETRY MLT: CPT

## 2022-12-21 PROCEDURE — 25000003 PHARM REV CODE 250: Performed by: ANESTHESIOLOGY

## 2022-12-21 PROCEDURE — 63600175 PHARM REV CODE 636 W HCPCS: Performed by: ANESTHESIOLOGY

## 2022-12-21 PROCEDURE — 94799 UNLISTED PULMONARY SVC/PX: CPT

## 2022-12-21 PROCEDURE — 99900035 HC TECH TIME PER 15 MIN (STAT)

## 2022-12-21 PROCEDURE — 25000003 PHARM REV CODE 250: Performed by: OBSTETRICS & GYNECOLOGY

## 2022-12-21 PROCEDURE — 85025 COMPLETE CBC W/AUTO DIFF WBC: CPT | Performed by: OBSTETRICS & GYNECOLOGY

## 2022-12-21 PROCEDURE — 36415 COLL VENOUS BLD VENIPUNCTURE: CPT | Performed by: OBSTETRICS & GYNECOLOGY

## 2022-12-21 PROCEDURE — 99231 SBSQ HOSP IP/OBS SF/LOW 25: CPT | Mod: ,,, | Performed by: OBSTETRICS & GYNECOLOGY

## 2022-12-21 RX ORDER — HYDROCODONE BITARTRATE AND ACETAMINOPHEN 5; 325 MG/1; MG/1
1 TABLET ORAL EVERY 6 HOURS PRN
Qty: 20 TABLET | Refills: 0 | Status: SHIPPED | OUTPATIENT
Start: 2022-12-21

## 2022-12-21 RX ORDER — IBUPROFEN 600 MG/1
600 TABLET ORAL 3 TIMES DAILY
Qty: 40 TABLET | Refills: 1 | Status: SHIPPED | OUTPATIENT
Start: 2022-12-21

## 2022-12-21 RX ADMIN — GUAIFENESIN 200 MG: 200 SOLUTION ORAL at 07:12

## 2022-12-21 RX ADMIN — OXYCODONE HYDROCHLORIDE 10 MG: 5 TABLET ORAL at 07:12

## 2022-12-21 RX ADMIN — OXYCODONE HYDROCHLORIDE 10 MG: 5 TABLET ORAL at 12:12

## 2022-12-21 RX ADMIN — IBUPROFEN 800 MG: 400 TABLET, FILM COATED ORAL at 05:12

## 2022-12-21 RX ADMIN — IBUPROFEN 800 MG: 400 TABLET, FILM COATED ORAL at 09:12

## 2022-12-21 RX ADMIN — ACETAMINOPHEN 650 MG: 325 TABLET ORAL at 11:12

## 2022-12-21 RX ADMIN — GUAIFENESIN 200 MG: 200 SOLUTION ORAL at 05:12

## 2022-12-21 RX ADMIN — OXYCODONE HYDROCHLORIDE 10 MG: 5 TABLET ORAL at 04:12

## 2022-12-21 RX ADMIN — MUPIROCIN: 20 OINTMENT TOPICAL at 08:12

## 2022-12-21 RX ADMIN — GUAIFENESIN 200 MG: 200 SOLUTION ORAL at 11:12

## 2022-12-21 RX ADMIN — OXYCODONE HYDROCHLORIDE 10 MG: 5 TABLET ORAL at 08:12

## 2022-12-21 RX ADMIN — OXYCODONE HYDROCHLORIDE 10 MG: 5 TABLET ORAL at 02:12

## 2022-12-21 RX ADMIN — ACETAMINOPHEN 650 MG: 325 TABLET ORAL at 05:12

## 2022-12-21 RX ADMIN — GUAIFENESIN 200 MG: 200 SOLUTION ORAL at 01:12

## 2022-12-21 RX ADMIN — KETOROLAC TROMETHAMINE 30 MG: 30 INJECTION, SOLUTION INTRAMUSCULAR; INTRAVENOUS at 11:12

## 2022-12-21 RX ADMIN — OXYCODONE HYDROCHLORIDE 10 MG: 5 TABLET ORAL at 11:12

## 2022-12-21 RX ADMIN — MUPIROCIN: 20 OINTMENT TOPICAL at 09:12

## 2022-12-21 RX ADMIN — KETOROLAC TROMETHAMINE 30 MG: 30 INJECTION, SOLUTION INTRAMUSCULAR; INTRAVENOUS at 05:12

## 2022-12-21 NOTE — PROGRESS NOTES
"PGY-2 Progress Note [facility]    Follow up for:   Chief Complaint   Patient presents with    Contractions       Hospital Stay Day 1    Subjective: AF VSS, good uop, -bm/-flatus, tolerating diet without n/v, ambulating. Pt reports Mild pain at incision site, well controlled with medication. Voiding well but no flatus or BM at this time  Denies any CP, SOB, N/V/D, headaches, fever or chills.     Scheduled Meds:   acetaminophen  650 mg Oral Q6H    BUPivacaine (PF) 0.25% (2.5 mg/ml)  30 mL Intrapleural Once    ibuprofen  800 mg Oral TID    ketorolac  30 mg Intravenous Q6H    mupirocin   Nasal BID    oxytocin in lactated ringers  95 jackie-units/min Intravenous Once    oxytocin in lactated ringers  95 jackie-units/min Intravenous Once     Continuous Infusions:   lactated ringers      lactated ringers 125 mL/hr at 22 1805    lanolin       PRN Meds:benzocaine-lanolin, bisacodyL, carboprost, diphenhydrAMINE, diphenhydrAMINE, famotidine (PF), [COMPLETED] clindamycin (CLEOCIN) IVPB **AND** gentamicin, guaiFENesin 100 mg/5 ml, hydrocortisone, lactated ringers, lanolin, magnesium hydroxide 400 mg/5 ml, measles, mumps and rubella vaccine, methylergonovine, miSOPROStoL, naloxone, ondansetron, oxyCODONE, oxyCODONE-acetaminophen, oxyCODONE-acetaminophen, prochlorperazine, senna-docusate 8.6-50 mg, simethicone, sodium chloride 0.9%, sodium citrate-citric acid 500-334 mg/5 ml, DIPH,PERTUSS(ACELL),TET VACCINE (ADULT)(BOOSTRIX,ADACEL)    Review of patient's allergies indicates:   Allergen Reactions    Penicillins Hives       Objectives:     Vitals(Most Recent)      BP  Min: 92/63  Max: 123/60  Temp  Av °F (36.7 °C)  Min: 97.9 °F (36.6 °C)  Max: 98.2 °F (36.8 °C)  Pulse  Av.8  Min: 48  Max: 91  Resp  Av.5  Min: 16  Max: 18  SpO2  Av.2 %  Min: 93 %  Max: 99 %  Height  Av' 7" (170.2 cm)  Min: 5' 7" (170.2 cm)  Max: 5' 7" (170.2 cm)  Weight  Av.7 kg (147 lb 0.4 oz)  Min: 66.7 kg (147 lb)  Max: 66.7 kg (147 lb " 0.8 oz)             Vitals(Dext73h)  Temp:  [97.9 °F (36.6 °C)-98.2 °F (36.8 °C)]   Pulse:  [48-91]   Resp:  [16-18]   BP: ()/(50-69)   SpO2:  [93 %-99 %]     I & O([id number])    Intake/Output Summary (Last 24 hours) at 12/21/2022 0757  Last data filed at 12/21/2022 0400  Gross per 24 hour   Intake 500 ml   Output 1097 ml   Net -597 ml     Urinalysis  No results for input(s): COLORU, CLARITYU, SPECGRAV, PHUR, PROTEINUA, GLUCOSEU, BILIRUBINCON, BLOODU, WBCU, RBCU, BACTERIA, MUCUS, NITRITE, LEUKOCYTESUR, UROBILINOGEN, HYALINECASTS in the last 24 hours.    General: AAOx3. NAD.  HEENT: NCAT. PERRLA. EOMI.   Neck: Supple. No JVD. No LAD.    CV: RRR. NL S1/S2. No M/R/G.   Chest: NL effort. CTAB. No R/R/W.   Abd: +BS x 4. Soft. ND/NT. No rebound or guarding.   Ext: No C/C/E. Peripheral pulses intact. NL ROM.   Skin: Intact. No rash. No lesions.   Neuro: CN II-XII intact. No focal deficit. Strength 5/5 throughout. Sensation intact.   Psych: Good judgement and reason. No A/V hallucinations. NL affect. No abnormal behaviors noted    LABS  CBC  Recent Labs   Lab 12/20/22  1534 12/20/22  1737 12/20/22  1741   WBC 7.54  --   --    RBC 4.34  --   --    HGB 14.4  --   --    HCT 41.8 46 50     --   --    MCV 96  --   --    MCH 33.2*  --   --    MCHC 34.4  --   --      BMP  No results for input(s): NA, K, CO2, CL, BUN, CREATININE, GLU in the last 168 hours.    POCT-Glucose  No results found for: POCTGLUCOSE    No results for input(s): CALCIUM, MG, PHOS in the last 168 hours.  LFT  No results for input(s): PROT, ALBUMIN, BILITOT, AST, ALKPHOS, ALT in the last 168 hours.      COAGS  No results for input(s): PT, INR, APTT in the last 168 hours.  CE  No results for input(s): TROPONINI, CKTOTAL, CKMB in the last 168 hours.  ABGs  Recent Labs   Lab 12/20/22  1741   PH 7.265*   PCO2 51.9*   PO2 27*   HCO3 23.5*   POCSATURATED 41*   BE -3     BNP  No results for input(s): BNP in the last 168 hours.  UA  No results for input(s):  COLORU, CLARITYU, SPECGRAV, PHUR, PROTEINUA, GLUCOSEU, BLOODU, WBCU, RBCU, BACTERIA, MUCUS in the last 24 hours.    Invalid input(s):  BILIRUBINCON  LAST HbA1c  No results found for: HGBA1C        Imaging      Micro:  Microbiology Results (last 7 days)       ** No results found for the last 168 hours. **                 Assessment/Plan:   PPD 1 s/p low transverse   Incision Clean and intake, no signs of bleeding or erythema  Pain well controlled, continue regimen  Tolerating Diet  Consider stool softener if no BM after breakfast/lunch            PT/OT: ordered and following  Code: full  Diet: regular  Dispo: Home     Case discussed with staff, Dr. Yareli Arizmendi MD  [facility] PGY-2  2022

## 2022-12-21 NOTE — ANESTHESIA POSTPROCEDURE EVALUATION
Anesthesia Post Evaluation    Patient: Agnes Alonso    Procedure(s) Performed: Procedure(s) (LRB):   SECTION, WITH TUBAL LIGATION (Bilateral)    Final Anesthesia Type: spinal      Patient location during evaluation: labor & delivery  Patient participation: Yes- Able to Participate  Level of consciousness: awake and alert  Post-procedure vital signs: reviewed and stable  Pain management: adequate  Airway patency: patent    PONV status at discharge: No PONV  Anesthetic complications: no      Cardiovascular status: blood pressure returned to baseline  Respiratory status: unassisted and spontaneous ventilation  Hydration status: euvolemic  Follow-up not needed.          Vitals Value Taken Time   /54 22 1805   Temp 97 22 1806   Pulse 68 22 1806   Resp 12 22 1806   SpO2 95 % 22 1804   Vitals shown include unvalidated device data.      No case tracking events are documented in the log.      Pain/Gael Score: No data recorded

## 2022-12-21 NOTE — PLAN OF CARE
Mother will breastfeed on cue at least eight or more times in 24 hours once baby stable and able. Will pump 8 or more times in 24 hours to establish and maintain breast milk supply. Will label EBM as instructed. Will clean all parts. Will call with any breastfeeding needs.

## 2022-12-21 NOTE — NURSING
Pt awake, c/o incisional pain. Pit at 95cc/hr per pump, lr at 125cc/hr per pump. Manzano patent, draining clear urine to urometer bag. Scd's on. Lt abd drsg c/d/I. Oxycodone 10mg po for incisional pain. Pt able to swallow w/o difficulty. Clear liquids for now. Continue poc.

## 2022-12-21 NOTE — PLAN OF CARE
Note copied from Infant's chart (MRN: [id number])     SOCIAL WORK DISCHARGE PLANNING ASSESSMENT     Sw completed discharge planning assessment with pt's parents in mother's room K306. Pt's parents were easily engaged and education on the role of  was provided. Pt's parents reported all necessities for patient were obtained, including a car seat. Pt's parents reported they have good family support and advised pt's maternal grandmother Karolyn will provide assistance as needed after returning home. Pt's father will provide transportation to family home following discharge. Pt's parents were provided with a NICU resources packet. Sw went over NICU resources packet. No other needs for community resources were reported. Sw provided pt's parent with contact information and encouraged pt's parents to call with any questions or concerns. Pt's parents verbalized understanding.      Legal Name: Autumn Santizo          :  2023  Address: [address] [place] [id number]  Parent's Phone Numbers: pt's mother Agnes Alonso [phone] and pt's father Cam Santizo [phone]     Pediatrician:  Dr. Nani Moore     Education: Information given on NICU Education Classes; Physician/NNP daily rounds; and Postpartum Depression signs.   Potential Eligibility for SSI Benefits: No            Patient Active Problem List   Diagnosis    Respiratory distress of     Need for observation and evaluation of  for sepsis    Alteration in nutrition     infant of 38 completed weeks of gestation            Birth Hospital:[facility]           [place]ARCUS: [email or web address]     Birth Weight:   3.1 kg (6 lb 13.4 oz)                Birth Length: 50.8cm               Gestational Age: 38w0d           Apgars    Living status: Living  Apgars:  1 min.:  5 min.:  10 min.:  15 min.:  20 min.:    Skin color:  0  1          Heart rate:  2  2          Reflex irritability:  2  2          Muscle tone:  2  2          Respiratory  effort:  1  1          Total:  7  8          Apgars assigned by: JOANN YAP RN           22 1503   NICU Assessment   Assessment Type Discharge Planning Assessment   Source of Information family   Verified Demographic and Insurance Information Yes   Insurance Medicaid   Medicaid [employer]   Medicaid Insurance Primary   Lives With mother;father   Relationship Status of Parents    Primary Source of Support/Comfort parent   Mother Employed Full Time   Father's Involvement Fully Involved   Is Father signing the birth certificate Yes   Father Name and  Cam Gooden   Father's Address [address] [place], [place] [address]   Family Involvement Moderate   Primary Contact Name and Number pt's mother Agnes Alonso [phone] and pt's father Cam Santizo [phone]   Other Contacts Names and Numbers pt's maternal grandmother Karolyn Alonso [phone]   Infant Feeding Plan breastfeeding   Does baby have crib or safe sleep space? Yes   Do you have a car seat? Yes   Resources/Education Provided [facility];SSI Benefits;Preparing for Your Baby's Discharge Home;Support Resources for NICU Families;Insurance Coverage of Breast Pumps and Supplies;Breast Pumps through [employer] insurance plan;Post Partum Depression;My NICU Baby Benny   DCFS No indications (Indicators for Report)   Discharge Plan A Home with family

## 2022-12-21 NOTE — DISCHARGE INSTRUCTIONS
"Patient Discharge Instructions for Postpartum Women    Resume Regular Diet  Increase activity gradually, no heavy lifting  Shower  No tampons, douching or sexual intercourse.  Discuss birth control options with your physician.  Wear a support bra  Return to work/school when you've been cleared by a physician    Call your physician if     *Fever of 100.4 or higher  *Persistent nausea/ vomiting  *Incisional drainage  *Heavy vaginal bleeding or large clots (Heavy bleeding is soaking 1 pad in an hour)  *Swelling and pain in arms or legs  *Severe headaches, blurred vision or fainting  *Shortness of breath  *Frequency and burning with urination  *Signs of postpartum depression, discuss these signs with your physician    Call lactation services for questions regarding feeding, nipple and breast care, and general questions about lactation.  They can be reached at [phone]         Understanding Postpartum Depression    You've just had a baby.  You know you should be excited and happy.  But instead you find yourself crying for no reason.  You may have trouble coping with your daily tasks.  You feel sad, tired, and hopeless most of the time.  You may even feel ashamed or guilty.  But what you're going through is not your fault and you can feel better.  Talk to your doctor.  He or she can help.    Depression After Childbirth    You may be weepy and tired right after giving birth.  These feelings are normal.  They're sometimes called the "baby blues."  These blues go away 2-3 weeks.  However, postpartum (meaning "after birth") depression lasts much longer and is more sever than the "baby blues."  It can make you feel sad and hopeless.  You may also fear that your baby will be harmed and worry about being a bad mother.      What is Depression?    Depression is a mood disorder that affects the way you think and feel.  The most common symptom is a feeling of deep sadness.  You may also feel as if you just can't cope with life.  "   Other symptoms include:      * Gaining or loosing weight  * Sleeping too much or too little  * Feeling tired all the time  * Feeling restless  * Fears of harming your baby   * Lack of interest in your baby  * Feeling worthless or guilty  * No longer finding pleasure in things you used to  * Having trouble thinking clearly or making decisions  * Thoughts of hurting yourself or your baby    What Causes Postpartum Depression    The exact causes of postpartum depression isn't known.  It may be due to changes in your hormones during and after childbirth.  You may also be tired from caring for your baby and adjusting to being a mother.  All these factors may make you feel depressed.  In some cases, your genes may also play a role.    Depression Can Be Treated    The good news is that there are many ways to treat postpartum depression.  Talking to your doctor is the first step toward feeling better.    Resources:    * National [place] of Mental Health  -- 618.424.2809    www.nimh.nih.gov    * National [place] on Mental Illness --984.108.2791    Www.jad.org    * Mental Health [place] -- 553.746.1677     Www.[facility].org    * National Suicide Hotline --326.100.1325 (800-SUICIDE)    7841-0078 [employer]  All rights reserved.  This information is not intended as a substitute for professional medical care.  Always follow up with your healthcare professional's instructions.       Breastfeeding Discharge Instructions      Feed the baby at the earliest sign of hunger or comfort  Hands to mouth, sucking motions  Rooting or searching for something to suck on  Dont wait for crying - it is a sign of distress    The feedings may be 8-12 times per 24hrs and will not follow a schedule  Avoid pacifiers and bottles for the first 4 weeks  Alternate the breast you start the feeding with, or start with the breast that feels the fullest  Switch breasts when the baby takes himself off the breast or falls asleep  Keep offering  breasts until the baby looks full, no longer gives hunger signs, and stays asleep when placed on his back in the crib  If the baby is sleepy and wont wake for a feeding, put the baby skin-to-skin dressed in a diaper against the mothers bare chest  Sleep near your baby  The baby should be positioned and latched on to the breast correctly  Chest-to-chest, chin in the breast  Babys lips are flipped outward  Babys mouth is stretched open wide like a shout  Babys sucking should feel like tugging to the mother  The baby should be drinking at the breast:  You should hear swallowing or gulping throughout the feeding  You should see milk on the babys lips when he comes off the breast  Your breasts should be softer when the baby is finished feeding  The baby should look relaxed at the end of feedings  After the 4th day and your milk is in:  The babys poop should turn bright yellow and be loose, watery, and seedy  The baby should have at least 3-4 poops the size of the palm of your hand per day  The baby should have at least 5-6 wet diapers per day  The urine should be light yellow in color  You should drink when you are thirsty and eat a healthy diet when you are    hungry.     Take naps to get the rest you need.   Take medications and/or drink alcohol only with permission of your obstetrician    or the babys pediatrician.  You can also call the Infant Risk Center,   ([phone]), Monday-Friday, 8am-5pm Central time, to get the most   up-to-date evidence-based information on the use of medications during   pregnancy and breastfeeding.      The baby should be examined by a pediatrician at 3-5 days of age.  Once your   milk comes in, the baby should be gaining at least ½ - 1oz each day and should be back to birthweight no later than 10-14 days of age.          Community Resources    [facility] Breastfeeding Warmline: [phone]  Local [facility] clinics: provide incentives and breastpumps to eligible  mothers  La Leche League International (LLLI):  mother-to-mother support group website        www.lll.org  Local La Leche League mother-to-mother support groups:        www.llperiCoCubes.com.Side.Cr        La Leche League [facility]   Dr. Nick Nunez website for latch videos and general information:        www.breastfeedinginc.ca  Infant Risk Center is a call center that provides information about the safety of taking medications while breastfeeding.  Call 1-399.433.5570, M-F, 8am-5pm, [place].  International Lactation Consultant Association provides resources for assistance:        www.ilca.org  [facility] Breastfeeding Coalition provides informationand resources for parents  and the community    http://[facility].org  Zip code search of breastfeeding resources and more:                                                                              www.LaBreastfeedingSupport.org          Maxine Haines is a mom-to-mom support group:                             www.nolanesting.com//breastfeedng-support/  [employer] for Healthy Babies:  5-712-037-BABY(4576)  [employer]: a breastfeeding support group for women of color, 821.274.6793

## 2022-12-21 NOTE — LACTATION NOTE
Medela Symphony pump, tubing, collections containers and labels present at bedside.  Discussed proper pump setting of initiation phase.  Instructed on proper usage of pump and to adjust suction according to maximum comfort level.  Verified appropriate flange fit.  Educated on the frequency and duration of pumping in order to promote and maintain a full milk supply.  Hands on pumping technique reviewed.  Encouraged hand expression after pumping.  Pt states she was instructed on cleaning of breast pump parts.  Written instructions also given.  Pt demonstrated understanding.    [facility]  Lactation Note - Mom    SUMMARY     Maternal Assessment    Breast Density: Bilateral:, soft  Nipples: Bilateral:, everted  Left Nipple Symptoms: other (see comments) (denies pain)  Right Nipple Symptoms: other (see comments) (denies pain)      LATCH Score         Breasts WDL    Breast WDL: WDL (per pt)  Left Nipple Symptoms: other (see comments) (denies pain)  Right Nipple Symptoms: other (see comments) (denies pain)    Maternal Infant Feeding    Maternal Preparation: breast care, hand hygiene  Maternal Emotional State: independent, relaxed  Pain with Feeding: no (with pumping)    Lactation Referrals    Community Referrals: outpatient lactation program, other (see comments) ([employer] for pump)  Outpatient Lactation Program Lactation Follow-up Date/Time: call lact ctr PRN    Lactation Interventions    Breast Care: Breastfeeding: supportive bra utilized, warm shower encouraged  Breastfeeding Assistance: electric breast pump used, support offered  Breast Care: Breastfeeding: supportive bra utilized, warm shower encouraged  Breastfeeding Assistance: electric breast pump used, support offered  Breastfeeding Support: encouragement provided, lactation counseling provided       Breastfeeding Session    Breast Pumping Interventions: early pumping promoted, frequent pumping encouraged    Maternal Information    Infant  Reason for Referral: other (see comments) (NICU)

## 2022-12-22 PROCEDURE — 72100002 HC LABOR CARE, 1ST 8 HOURS

## 2022-12-22 PROCEDURE — 99232 SBSQ HOSP IP/OBS MODERATE 35: CPT | Mod: ,,, | Performed by: OBSTETRICS & GYNECOLOGY

## 2022-12-22 PROCEDURE — 99900035 HC TECH TIME PER 15 MIN (STAT)

## 2022-12-22 PROCEDURE — 11000001 HC ACUTE MED/SURG PRIVATE ROOM

## 2022-12-22 PROCEDURE — 25000003 PHARM REV CODE 250: Performed by: OBSTETRICS & GYNECOLOGY

## 2022-12-22 PROCEDURE — 25000003 PHARM REV CODE 250: Performed by: ANESTHESIOLOGY

## 2022-12-22 PROCEDURE — 99232 PR SUBSEQUENT HOSPITAL CARE,LEVL II: ICD-10-PCS | Mod: ,,, | Performed by: OBSTETRICS & GYNECOLOGY

## 2022-12-22 PROCEDURE — 94761 N-INVAS EAR/PLS OXIMETRY MLT: CPT

## 2022-12-22 PROCEDURE — 51702 INSERT TEMP BLADDER CATH: CPT

## 2022-12-22 PROCEDURE — 72100003 HC LABOR CARE, EA. ADDL. 8 HRS

## 2022-12-22 RX ADMIN — IBUPROFEN 800 MG: 400 TABLET, FILM COATED ORAL at 08:12

## 2022-12-22 RX ADMIN — OXYCODONE HYDROCHLORIDE AND ACETAMINOPHEN 1 TABLET: 5; 325 TABLET ORAL at 02:12

## 2022-12-22 RX ADMIN — OXYCODONE AND ACETAMINOPHEN 1 TABLET: 10; 325 TABLET ORAL at 07:12

## 2022-12-22 RX ADMIN — MAGNESIUM HYDROXIDE 800 MG: 400 SUSPENSION ORAL at 08:12

## 2022-12-22 RX ADMIN — OXYCODONE AND ACETAMINOPHEN 1 TABLET: 10; 325 TABLET ORAL at 08:12

## 2022-12-22 RX ADMIN — OXYCODONE HYDROCHLORIDE 10 MG: 5 TABLET ORAL at 03:12

## 2022-12-22 RX ADMIN — GUAIFENESIN 200 MG: 200 SOLUTION ORAL at 03:12

## 2022-12-22 RX ADMIN — GUAIFENESIN 200 MG: 200 SOLUTION ORAL at 08:12

## 2022-12-22 RX ADMIN — IBUPROFEN 800 MG: 400 TABLET, FILM COATED ORAL at 02:12

## 2022-12-22 RX ADMIN — GUAIFENESIN 200 MG: 200 SOLUTION ORAL at 12:12

## 2022-12-22 RX ADMIN — MUPIROCIN: 20 OINTMENT TOPICAL at 09:12

## 2022-12-22 RX ADMIN — GUAIFENESIN 200 MG: 200 SOLUTION ORAL at 09:12

## 2022-12-22 RX ADMIN — GUAIFENESIN 200 MG: 200 SOLUTION ORAL at 05:12

## 2022-12-22 RX ADMIN — MUPIROCIN: 20 OINTMENT TOPICAL at 08:12

## 2022-12-22 RX ADMIN — IBUPROFEN 800 MG: 400 TABLET, FILM COATED ORAL at 09:12

## 2022-12-22 NOTE — PLAN OF CARE
Rounded on pt. Stated that she has pumped about 6x in the last 24hrs. Encouraged to try to pump at least 8+ times/24hrs. Discussed importance of pumping frequently; supply/demand. Instructed on use of manual pump piece in case needed. Discussed need for pump at home. Has [employer] handout-encouraged to call now to see about obtaining pump thru insurance. Mom will pump/hand express at least 8+ times/24 hrs for nicu baby. Symphony pump at bs. Reviewed use/cleaning. Stressed importance of hand hygiene & keeping pump kit clean. Will collect, label, store & transport EBM as instructed. Will call for any needs. Questions answered. Verbalized understanding.

## 2022-12-22 NOTE — LACTATION NOTE
[facility]  Lactation Note - Mom    SUMMARY     Maternal Assessment    Breast Density: Bilateral:, soft  Nipples: Bilateral:, everted  Left Nipple Symptoms: other (see comments) (denies pain)  Right Nipple Symptoms: other (see comments) (denies pain)      LATCH Score         Breasts WDL    Breast WDL: WDL (per mom;did not assess)  Left Nipple Symptoms: other (see comments) (denies pain)  Right Nipple Symptoms: other (see comments) (denies pain)    Maternal Infant Feeding    Maternal Preparation: breast care, hand hygiene  Maternal Emotional State: relaxed  Pain with Feeding: no (w/pumping)  Comfort Measures Following Feeding: air-drying encouraged    Lactation Referrals    Community Referrals: home health care  Home Health Care Lactation Follow-up Date/Time: discussed obtaining pump thru insurance; to call [employer] now; has handout  Outpatient Lactation Program Lactation Follow-up Date/Time: call lact ctr PRN    Lactation Interventions    Breast Care: Breastfeeding: breast milk to nipples  Breastfeeding Assistance: hand expression verified, support offered  Breast Care: Breastfeeding: breast milk to nipples  Breastfeeding Assistance: hand expression verified, support offered  Breastfeeding Support: diary/feeding log utilized, encouragement provided, lactation counseling provided, maternal rest encouraged       Breastfeeding Session    Breast Pumping Interventions: frequent pumping encouraged    Maternal Information    Infant Reason for Referral: other (see comments) (NICU)

## 2022-12-22 NOTE — NURSING
Patient ambulating independently. POC reviewed with pt; able to verbalize acceptance and understanding. Questions answered/encouraged; reassurance provided. Support of spouse and other family noted at bedside. Tolerating regular diet.  LTV incision with aquacel dressing c/d/i. Voiding and passing flatus.  Pain well controlled with ordered medications and use of cold compress. Lochia rubra and light.  Fundus firm and at 1cm below umbilicus. Bonding with baby AEB visiting infant frequently in NICU, holding, feeding, and changing baby's diaper. Smiles appropriately at baby. Call light in reach, side rails up x2, nonskid socks on, bed in lowest position with wheels locked.  Remains free from falls and/or injury. VSS. NAD noted. Will continue to monitor.

## 2022-12-22 NOTE — PROGRESS NOTES
PGY-2 Progress Note [facility]    Follow up for:   Chief Complaint   Patient presents with    Contractions       Hospital Stay Day 2    Subjective: AF VSS, good uop, -bm/+flatus, tolerating diet without n/v, ambulating. Pt reports Mild pain at incision site, well controlled with medication. Voiding well, passing flatus but no BM at this time  Denies any CP, SOB, N/V/D, headaches, fever or chills.     Scheduled Meds:   BUPivacaine (PF) 0.25% (2.5 mg/ml)  30 mL Intrapleural Once    ibuprofen  800 mg Oral TID    mupirocin   Nasal BID    oxytocin in lactated ringers  95 jackie-units/min Intravenous Once    oxytocin in lactated ringers  95 jackie-units/min Intravenous Once     Continuous Infusions:   lactated ringers      lactated ringers 125 mL/hr at 22 1805    lanolin       PRN Meds:benzocaine-lanolin, bisacodyL, carboprost, diphenhydrAMINE, diphenhydrAMINE, famotidine (PF), [COMPLETED] clindamycin (CLEOCIN) IVPB **AND** gentamicin, guaiFENesin 100 mg/5 ml, hydrocortisone, lactated ringers, lanolin, magnesium hydroxide 400 mg/5 ml, measles, mumps and rubella vaccine, methylergonovine, miSOPROStoL, naloxone, ondansetron, oxyCODONE, oxyCODONE-acetaminophen, oxyCODONE-acetaminophen, prochlorperazine, senna-docusate 8.6-50 mg, simethicone, sodium chloride 0.9%, sodium citrate-citric acid 500-334 mg/5 ml, DIPH,PERTUSS(ACELL),TET VACCINE (ADULT)(BOOSTRIX,ADACEL)    Review of patient's allergies indicates:   Allergen Reactions    Penicillins Hives       Objectives:     Vitals(Most Recent)      BP  Min: 109/56  Max: 137/71  Temp  Av.1 °F (36.7 °C)  Min: 97.5 °F (36.4 °C)  Max: 98.6 °F (37 °C)  Pulse  Av  Min: 62  Max: 87  Resp  Av.7  Min: 16  Max: 20  SpO2  Av.4 %  Min: 96 %  Max: 100 %             Vitals(Oztl22o)  Temp:  [97.5 °F (36.4 °C)-98.6 °F (37 °C)]   Pulse:  [62-87]   Resp:  [16-20]   BP: (109-137)/(56-85)   SpO2:  [96 %-100 %]     I & O(Vcke82k)  No intake or output data in the 24 hours ending  12/22/22 0800    Urinalysis  No results for input(s): COLORU, CLARITYU, SPECGRAV, PHUR, PROTEINUA, GLUCOSEU, BILIRUBINCON, BLOODU, WBCU, RBCU, BACTERIA, MUCUS, NITRITE, LEUKOCYTESUR, UROBILINOGEN, HYALINECASTS in the last 24 hours.    General: AAOx3. NAD.  HEENT: NCAT. PERRLA. EOMI.   Neck: Supple. No JVD. No LAD.    CV: RRR. NL S1/S2. No M/R/G.   Chest: NL effort. CTAB. No R/R/W.   Abd: +BS x 4. Soft. ND/NT. No rebound or guarding.   Ext: No C/C/E. Peripheral pulses intact. NL ROM.   Skin: Intact. No rash. No lesions. Incision, clean clear intact, no erythema warmth or discharge   Neuro: CN II-XII intact. No focal deficit. Strength 5/5 throughout. Sensation intact.   Psych: Good judgement and reason. No A/V hallucinations. NL affect. No abnormal behaviors noted    LABS  CBC  Recent Labs   Lab 12/20/22  1534 12/20/22  1737 12/20/22  1741 12/21/22  0733   WBC 7.54  --   --  6.99   RBC 4.34  --   --  3.76*   HGB 14.4  --   --  12.4   HCT 41.8 46 50 36.6*     --   --  143*   MCV 96  --   --  97   MCH 33.2*  --   --  33.0*   MCHC 34.4  --   --  33.9     BMP  No results for input(s): NA, K, CO2, CL, BUN, CREATININE, GLU in the last 168 hours.    POCT-Glucose  No results found for: POCTGLUCOSE    No results for input(s): CALCIUM, MG, PHOS in the last 168 hours.  LFT  No results for input(s): PROT, ALBUMIN, BILITOT, AST, ALKPHOS, ALT in the last 168 hours.      COAGS  No results for input(s): PT, INR, APTT in the last 168 hours.  CE  No results for input(s): TROPONINI, CKTOTAL, CKMB in the last 168 hours.  ABGs  No results for input(s): PH, PCO2, PO2, HCO3, POCSATURATED, BE in the last 24 hours.    BNP  No results for input(s): BNP in the last 168 hours.  UA  No results for input(s): COLORU, CLARITYU, SPECGRAV, PHUR, PROTEINUA, GLUCOSEU, BLOODU, WBCU, RBCU, BACTERIA, MUCUS in the last 24 hours.    Invalid input(s):  BILIRUBINCON  LAST HbA1c  No results found for: HGBA1C        Imaging      Micro:  Microbiology  Results (last 7 days)       ** No results found for the last 168 hours. **                 Assessment/Plan:   PPD 2 s/p low transverse   Incision Clean and intake, no signs of bleeding or erythema  Pain well controlled, continue regimen  Tolerating Diet  Prn stool softener today        PT/OT: ordered and following  Code: full  Diet: regular  Dispo: Home     Case discussed with staff, Dr. Yareli Arizmendi MD  [facility] [place] [facility] PGY-2  2022

## 2022-12-23 ENCOUNTER — CLINICAL SUPPORT (OUTPATIENT)
Dept: SMOKING CESSATION | Facility: CLINIC | Age: 37
End: 2022-12-23
Payer: MEDICAID

## 2022-12-23 VITALS
HEIGHT: 67 IN | HEART RATE: 84 BPM | BODY MASS INDEX: 23.07 KG/M2 | TEMPERATURE: 98 F | SYSTOLIC BLOOD PRESSURE: 135 MMHG | WEIGHT: 147 LBS | DIASTOLIC BLOOD PRESSURE: 60 MMHG | RESPIRATION RATE: 18 BRPM | OXYGEN SATURATION: 96 %

## 2022-12-23 DIAGNOSIS — F17.210 CIGARETTE SMOKER: Primary | ICD-10-CM

## 2022-12-23 PROCEDURE — 99406 PT REFUSED TOBACCO CESSATION: ICD-10-PCS | Mod: ,,,

## 2022-12-23 PROCEDURE — 99238 PR HOSPITAL DISCHARGE DAY,<30 MIN: ICD-10-PCS | Mod: ,,, | Performed by: STUDENT IN AN ORGANIZED HEALTH CARE EDUCATION/TRAINING PROGRAM

## 2022-12-23 PROCEDURE — 25000003 PHARM REV CODE 250: Performed by: OBSTETRICS & GYNECOLOGY

## 2022-12-23 PROCEDURE — 99900035 HC TECH TIME PER 15 MIN (STAT)

## 2022-12-23 PROCEDURE — 99406 BEHAV CHNG SMOKING 3-10 MIN: CPT | Mod: ,,,

## 2022-12-23 PROCEDURE — 99238 HOSP IP/OBS DSCHRG MGMT 30/<: CPT | Mod: ,,, | Performed by: STUDENT IN AN ORGANIZED HEALTH CARE EDUCATION/TRAINING PROGRAM

## 2022-12-23 RX ADMIN — GUAIFENESIN 200 MG: 200 SOLUTION ORAL at 05:12

## 2022-12-23 RX ADMIN — OXYCODONE AND ACETAMINOPHEN 1 TABLET: 10; 325 TABLET ORAL at 12:12

## 2022-12-23 RX ADMIN — IBUPROFEN 800 MG: 400 TABLET, FILM COATED ORAL at 08:12

## 2022-12-23 RX ADMIN — GUAIFENESIN 200 MG: 200 SOLUTION ORAL at 11:12

## 2022-12-23 RX ADMIN — GUAIFENESIN 200 MG: 200 SOLUTION ORAL at 01:12

## 2022-12-23 RX ADMIN — OXYCODONE AND ACETAMINOPHEN 1 TABLET: 10; 325 TABLET ORAL at 04:12

## 2022-12-23 RX ADMIN — IBUPROFEN 800 MG: 400 TABLET, FILM COATED ORAL at 02:12

## 2022-12-23 RX ADMIN — OXYCODONE AND ACETAMINOPHEN 1 TABLET: 10; 325 TABLET ORAL at 11:12

## 2022-12-23 RX ADMIN — MUPIROCIN: 20 OINTMENT TOPICAL at 08:12

## 2022-12-23 NOTE — PLAN OF CARE
Rounded on pt. Baby has been rooming in with mom since yesterday evening. Mom stated that baby has been BR well & she has not needed to pump. Lots of praise & reassurance provided. D/c teaching done. Mom stated that she has been BR & FF. Discussed benefits of BR/possible risks of FF; size of baby's stomach; adequacy of colostrum; supply/demand. Encouraged more BR & to do so first; discouraged FF if BR effectively. Discussed weaning from supplementation as baby is BR better & milk is coming in. Mom will breastfeed frequently & on cue at least 8+ times/24 hrs.  Will monitor for signs of deep latch & adequate fdg; I&O.  Will have baby's weight checked at ped's office in the next couple of days after d/c from hospital as recommended. Discussed available resources in Breastfeeding Guide. Symphony breast pump at bs. Discussed need for pump at home. Stated that she has obtained a breast pump thru insurance for home use. Mom will pump/hand express as needed if baby unable to BR effectively & consistently. Reviewed use/cleaning. Reviewed use of manual pump piece & encouraged to keep handy in case of emergency. Stressed importance of hand hygiene & keeping pump kit clean. Will collect, label, store & transport EBM as instructed. Lots of questions answered. Encouraged to call for any needs. Verbalized understanding.

## 2022-12-23 NOTE — PROGRESS NOTES
PGY-2 Progress Note [facility]    Follow up for:   Chief Complaint   Patient presents with    Contractions       Hospital Stay Day 3    Subjective: AF VSS, good uop, +bm/+flatus, tolerating diet without n/v, ambulating. Pt reports Mild pain at incision site, well controlled with medication and ice packs. Voiding well, passing flatus but no BM at this time  Denies any CP, SOB, N/V/D, headaches, fever or chills.     Scheduled Meds:   BUPivacaine (PF) 0.25% (2.5 mg/ml)  30 mL Intrapleural Once    ibuprofen  800 mg Oral TID    mupirocin   Nasal BID    oxytocin in lactated ringers  95 jackie-units/min Intravenous Once    oxytocin in lactated ringers  95 jackie-units/min Intravenous Once     Continuous Infusions:   lactated ringers      lactated ringers 125 mL/hr at 22 1805    lanolin       PRN Meds:benzocaine-lanolin, bisacodyL, carboprost, diphenhydrAMINE, diphenhydrAMINE, famotidine (PF), [COMPLETED] clindamycin (CLEOCIN) IVPB **AND** gentamicin, guaiFENesin 100 mg/5 ml, hydrocortisone, lactated ringers, lanolin, magnesium hydroxide 400 mg/5 ml, measles, mumps and rubella vaccine, methylergonovine, miSOPROStoL, naloxone, ondansetron, oxyCODONE, oxyCODONE-acetaminophen, oxyCODONE-acetaminophen, prochlorperazine, senna-docusate 8.6-50 mg, simethicone, sodium chloride 0.9%, sodium citrate-citric acid 500-334 mg/5 ml, DIPH,PERTUSS(ACELL),TET VACCINE (ADULT)(BOOSTRIX,ADACEL)    Review of patient's allergies indicates:   Allergen Reactions    Penicillins Hives       Objectives:     Vitals(Most Recent)      BP  Min: 121/80  Max: 142/78  Temp  Av.8 °F (36.6 °C)  Min: 97.6 °F (36.4 °C)  Max: 98 °F (36.7 °C)  Pulse  Av.8  Min: 72  Max: 84  Resp  Av.8  Min: 17  Max: 18  SpO2  Av.5 %  Min: 96 %  Max: 99 %             Vitals([id number])  Temp:  [97.6 °F (36.4 °C)-98 °F (36.7 °C)]   Pulse:  [72-84]   Resp:  [17-18]   BP: (121-142)/(60-80)   SpO2:  [96 %-99 %]     I & O([id number])  No intake or output data in the 24  hours ending 12/23/22 0854    Urinalysis  No results for input(s): COLORU, CLARITYU, SPECGRAV, PHUR, PROTEINUA, GLUCOSEU, BILIRUBINCON, BLOODU, WBCU, RBCU, BACTERIA, MUCUS, NITRITE, LEUKOCYTESUR, UROBILINOGEN, HYALINECASTS in the last 24 hours.    General: AAOx3. NAD.  HEENT: NCAT. PERRLA. EOMI.   Neck: Supple. No JVD. No LAD.    CV: RRR. NL S1/S2. No M/R/G.   Chest: NL effort. CTAB. No R/R/W.   Abd: +BS x 4. Soft. ND/NT. No rebound or guarding.   Ext: No C/C/E. Peripheral pulses intact. NL ROM.   Skin: Intact. No rash. No lesions. Incision, clean clear intact, no erythema warmth or discharge   Neuro: CN II-XII intact. No focal deficit. Strength 5/5 throughout. Sensation intact.   Psych: Good judgement and reason. No A/V hallucinations. NL affect. No abnormal behaviors noted    LABS  CBC  Recent Labs   Lab 12/20/22  1534 12/20/22  1737 12/20/22  1741 12/21/22  0733   WBC 7.54  --   --  6.99   RBC 4.34  --   --  3.76*   HGB 14.4  --   --  12.4   HCT 41.8 46 50 36.6*     --   --  143*   MCV 96  --   --  97   MCH 33.2*  --   --  33.0*   MCHC 34.4  --   --  33.9     BMP  No results for input(s): NA, K, CO2, CL, BUN, CREATININE, GLU in the last 168 hours.    POCT-Glucose  No results found for: POCTGLUCOSE    No results for input(s): CALCIUM, MG, PHOS in the last 168 hours.  LFT  No results for input(s): PROT, ALBUMIN, BILITOT, AST, ALKPHOS, ALT in the last 168 hours.      COAGS  No results for input(s): PT, INR, APTT in the last 168 hours.  CE  No results for input(s): TROPONINI, CKTOTAL, CKMB in the last 168 hours.  ABGs  No results for input(s): PH, PCO2, PO2, HCO3, POCSATURATED, BE in the last 24 hours.    BNP  No results for input(s): BNP in the last 168 hours.  UA  No results for input(s): COLORU, CLARITYU, SPECGRAV, PHUR, PROTEINUA, GLUCOSEU, BLOODU, WBCU, RBCU, BACTERIA, MUCUS in the last 24 hours.    Invalid input(s):  BILIRUBINCON  LAST HbA1c  No results found for:  HGBA1C        Imaging      Micro:  Microbiology Results (last 7 days)       ** No results found for the last 168 hours. **                 Assessment/Plan:   PPD 3 s/p low transverse   Incision Clean and intact, no signs of bleeding or erythema  Pain well controlled, continue regimen  Tolerating Diet          PT/OT: ordered and following  Code: full  Diet: regular  Dispo: Home today    Case discussed with staff, Dr. Jaswinder Arizmendi MD  [facility] PGY-2  2022

## 2022-12-23 NOTE — PLAN OF CARE
Vss, nad, pain well controlled w/po pain meds, tolerating po, ambulating w/o difficulty, appears to be bonding well w/infant.  Pt would like to breast feed - try to latch infant now that infant is in room with pt.  Poc: around the clock pain management, ambulation and po hydration encouraged, breast feeding support offered, continue to monitor.  Reviewed poc w/pt.  Pt verbalized understanding.

## 2022-12-23 NOTE — PLAN OF CARE
Pt received discharge instructions.  Prescriptions previously received per OP pharmacy.  Questions answered/encouraged.  Pt verbalized understanding.  Car seat at bedside.  Significant other at bedside and also verbalized understanding.      1610: Pt leaving unit independently for DC. Infant carried out by father in car seat. NAD noted.

## 2022-12-23 NOTE — LACTATION NOTE
[facility]  Lactation Note - Mom    SUMMARY     Maternal Assessment    Breast Size Issue: none  Breast Shape: Bilateral:, round  Breast Density: Bilateral:, filling  Areola: Bilateral:, elastic  Nipples: Bilateral:, everted  Left Nipple Symptoms: tender  Right Nipple Symptoms: tender      LATCH Score         Breasts WDL    Breast WDL: WDL  Left Nipple Symptoms: tender  Right Nipple Symptoms: tender    Maternal Infant Feeding    Maternal Preparation: breast care, hand hygiene  Maternal Emotional State: independent, relaxed  Infant Positioning: cradle, cross-cradle  Signs of Milk Transfer: infant jaw motion present, suck/swallow ratio, audible swallow  Pain with Feeding: yes (tender per mom)  Pain Location: nipples, bilateral  Pain Description: soreness  Comfort Measures Before/During Feeding: infant position adjusted, latch adjusted, suction broken using finger  Comfort Measures Following Feeding: air-drying encouraged, expressed milk applied  Nipple Shape After Feeding, Left: round  Nipple Shape After Feeding, Right: round  Latch Assistance: no    Lactation Referrals    Community Referrals: outpatient lactation program, pediatric care provider, support group, WIC (women, infants and children) program  Home Health Care Lactation Follow-up Date/Time: discussed obtaining pump thru insurance; to call [facility] now; has handout  Outpatient Lactation Program Lactation Follow-up Date/Time: enc to call warmline w/questions prn  Pediatric Care Provider Lactation Follow-up Date/Time: has appt 12/24/22 at 0915 w/Dr Molina  Support Group Lactation Follow-up Date/Time: rev'd resources in [facility] Lactation Follow-up Date/Time: rev'd resources in [facility]    Lactation Interventions    Breast Care: Breastfeeding: breast milk to nipples, lanolin to nipples, supportive bra utilized, milk massaged towards nipple  Breastfeeding Assistance: assisted with positioning, feeding cue recognition promoted,  feeding on demand promoted, feeding session observed, hand expression verified, infant latch-on verified, infant stimulated to wakeful state, infant suck/swallow verified, support offered  Breast Care: Breastfeeding: breast milk to nipples, lanolin to nipples, supportive bra utilized, milk massaged towards nipple  Breastfeeding Assistance: assisted with positioning, feeding cue recognition promoted, feeding on demand promoted, feeding session observed, hand expression verified, infant latch-on verified, infant stimulated to wakeful state, infant suck/swallow verified, support offered  Breastfeeding Support: diary/feeding log utilized, encouragement provided, lactation counseling provided, maternal hydration promoted, maternal nutrition promoted, maternal rest encouraged       Breastfeeding Session    Breast Pumping Interventions: frequent pumping encouraged, post-feed pumping encouraged  Infant Positioning: cradle, cross-cradle  Signs of Milk Transfer: infant jaw motion present, suck/swallow ratio, audible swallow    Maternal Information    Infant Reason for Referral: other (see comments) (NICU)

## 2022-12-23 NOTE — DISCHARGE SUMMARY
[facility]  Discharge Note  Short Stay    Delivery Discharge Summary  Obstetrics      Primary OB Clinician: Dr. Salvatore Downs    Admission date: 2022  Discharge date: 2022    Admit Dx:   Patient Active Problem List   Diagnosis    Delivery by  section of full-term infant    Chronic lower back pain    Dorsalgia, unspecified    Bilateral edema of lower extremity    Amenorrhea    Gastroenteritis    Chronic bilateral low back pain without sciatica    Chronic bilateral thoracic back pain    Other idiopathic scoliosis, thoracolumbar region    Herpes genitalia    History of illicit drug use    Cigarette nicotine dependence without complication    Generalized headaches    HSV infection    Cervical syndrome    Chronic pain due to trauma    Chronic pain    Lumbar spondylosis    Bartholin's gland cyst    Chronic hepatitis C - LOST TO F/U     Discharge Dx:   Patient Active Problem List   Diagnosis    Delivery by  section of full-term infant    Chronic lower back pain    Dorsalgia, unspecified    Bilateral edema of lower extremity    Amenorrhea    Gastroenteritis    Chronic bilateral low back pain without sciatica    Chronic bilateral thoracic back pain    Other idiopathic scoliosis, thoracolumbar region    Herpes genitalia    History of illicit drug use    Cigarette nicotine dependence without complication    Generalized headaches    HSV infection    Cervical syndrome    Chronic pain due to trauma    Chronic pain    Lumbar spondylosis    Bartholin's gland cyst    Chronic hepatitis C - LOST TO F/U       Procedure: , due to Previous     Hospital Course:  Pt is a 37 y.o. now  by , due to Previous  , POD # 3 was admitted on 2022. On initial assessment, vital signs were stable and physical exam was Normal.  Patient was subsequently admitted to labor and delivery unit with signed consents.  Patient subsequently delivered a viable infant, please  see delivery information below or full delivery note for further details. Pt was in stable condition post delivery and was transferred to the Mother-Baby Unit in a stable condition. Her postpartum course was uncomplicated. On discharge day, patient's pain is well  controlled with oral pain medications. Pt is tolerating ambulation without SOB or CP, and PO diet without N/V. Reports lochia is minimal in degree. Denies any HA, vision changes, F/C, LE swelling. Denies any breast pain/soreness.  Pt in stable condition and ready for discharge, instructed to continue PNV, Iron supplement, and to follow up in the OB clinic in 4-6 weeks with Dr. Downs.        Birth information:  YOB: 2023   Time of birth: 5:04 PM   Sex: male   Delivery type: , Low Transverse   Gestational Age: 38w0d    Delivery Clinician:      Other providers:           Living:           APGARS  One minute Five minutes Ten minutes   Skin color:         Heart rate:         Grimace:         Muscle tone:         Breathing:         Totals: 7  8        Placenta: Delivered:       appearance    Patient Instructions:   Current Discharge Medication List        START taking these medications    Details   HYDROcodone-acetaminophen (NORCO) 5-325 mg per tablet Take 1 tablet by mouth every 6 (six) hours as needed for Pain.  Qty: 20 tablet, Refills: 0    Comments: Quantity prescribed more than 7 day supply? No      ibuprofen (ADVIL,MOTRIN) 600 MG tablet Take 1 tablet (600 mg total) by mouth 3 (three) times daily.  Qty: 40 tablet, Refills: 1           CONTINUE these medications which have NOT CHANGED    Details   prenatal 25/iron fum/folic/dha (PRENATAL-1 ORAL) Take by mouth.           STOP taking these medications       acetaminophen (TYLENOL) 500 MG tablet Comments:   Reason for Stopping:               Discharge Procedure Orders   BREAST PUMP FOR HOME USE     Order Specific Question Answer Comments   Type of pump: Electric    Weight: 66.7 kg  (147 lb)    Does patient have medical equipment at home? none    Length of need (1-99 months): 99        Procedure(s) (LRB):   SECTION, WITH TUBAL LIGATION (Bilateral)      OUTCOME: Patient tolerated treatment/procedure well without complication and is now ready for discharge.    DISPOSITION: Home or Self Care    FINAL DIAGNOSIS:  Delivery by  section of full-term infant    FOLLOWUP: In clinic    DISCHARGE INSTRUCTIONS:    Discharge Procedure Orders   BREAST PUMP FOR HOME USE     Order Specific Question Answer Comments   Type of pump: Electric    Weight: 66.7 kg (147 lb)    Does patient have medical equipment at home? none    Length of need (1-99 months): 99         TIME SPENT ON DISCHARGE: 30 minutes    Cam Arizmendi MD  [facility] PGY-2  2022

## 2022-12-23 NOTE — PROGRESS NOTES
Smoking cessation education and handouts provided, including the dangers of 2nd and 3rd hand smoke, and [place] Act 838 prohibiting smoking in a motor vehicle with a child under age 13 in the vehicle. Pt is a 1 pk/day cigarette smoker x 25 yrs, plus recently started vaping with nicotine. Handout for Ambulatory Smoking Cessation clinic provided.

## 2022-12-30 ENCOUNTER — TELEPHONE (OUTPATIENT)
Dept: OBSTETRICS AND GYNECOLOGY | Facility: CLINIC | Age: 37
End: 2022-12-30
Payer: MEDICAID

## 2022-12-30 NOTE — TELEPHONE ENCOUNTER
----- Message from Joseline Marc sent at 2022 11:01 AM CST -----  Type:  Sooner Apoointment Request    Caller is requesting a sooner appointment.  Caller declined first available appointment listed below.  Caller will not accept being placed on the waitlist and is requesting a message be sent to doctor.  Name of Caller:pt  When is the first available appointment?23  Symptoms:follow up after delivery by   Would the patient rather a call back or a response via [facility]? call  Best Call Back Number:[phone]  Additional Information:

## 2023-01-03 ENCOUNTER — POSTPARTUM VISIT (OUTPATIENT)
Dept: OBSTETRICS AND GYNECOLOGY | Facility: CLINIC | Age: 38
End: 2023-01-03
Payer: MEDICAID

## 2023-01-03 ENCOUNTER — ANESTHESIA (OUTPATIENT)
Dept: OBSTETRICS AND GYNECOLOGY | Facility: HOSPITAL | Age: 38
End: 2023-01-03
Payer: MEDICAID

## 2023-01-03 VITALS — WEIGHT: 122 LBS | SYSTOLIC BLOOD PRESSURE: 138 MMHG | BODY MASS INDEX: 19.11 KG/M2 | DIASTOLIC BLOOD PRESSURE: 91 MMHG

## 2023-01-03 DIAGNOSIS — Z98.890 POSTOPERATIVE STATE: Primary | ICD-10-CM

## 2023-01-03 PROCEDURE — 99999 PR PBB SHADOW E&M-EST. PATIENT-LVL III: CPT | Mod: PBBFAC,,, | Performed by: OBSTETRICS & GYNECOLOGY

## 2023-01-03 PROCEDURE — 59430 PR CARE AFTER DELIVERY ONLY: ICD-10-PCS | Mod: S$PBB,,, | Performed by: OBSTETRICS & GYNECOLOGY

## 2023-01-03 PROCEDURE — 99213 OFFICE O/P EST LOW 20 MIN: CPT | Mod: PBBFAC,PO | Performed by: OBSTETRICS & GYNECOLOGY

## 2023-01-03 PROCEDURE — 99999 PR PBB SHADOW E&M-EST. PATIENT-LVL III: ICD-10-PCS | Mod: PBBFAC,,, | Performed by: OBSTETRICS & GYNECOLOGY

## 2023-01-03 NOTE — PROGRESS NOTES
S/p c/s and BTL 12/20 at 38 wks for active labor  Incision healing well    Baby is doing well  Rtc 6wks for pp f/u

## 2023-01-04 LAB
FINAL PATHOLOGIC DIAGNOSIS: NORMAL
GROSS: NORMAL
Lab: NORMAL

## 2023-01-25 ENCOUNTER — PATIENT MESSAGE (OUTPATIENT)
Dept: ADMINISTRATIVE | Facility: HOSPITAL | Age: 38
End: 2023-01-25
Payer: MEDICAID

## (undated) DEVICE — SUT VICRYL CTD 2-0 GI 27 SH

## (undated) DEVICE — DRESSING LEUKOPLAST FLEX 1X3IN

## (undated) DEVICE — SEE MEDLINE ITEM 146313

## (undated) DEVICE — JELLY SURGILUBE 5GR

## (undated) DEVICE — GLOVE BIOGEL SKINSENSE PI 6.5

## (undated) DEVICE — DRESSING AQUACEL AG 3.5X10IN